# Patient Record
Sex: FEMALE | Race: WHITE | Employment: OTHER | URBAN - METROPOLITAN AREA
[De-identification: names, ages, dates, MRNs, and addresses within clinical notes are randomized per-mention and may not be internally consistent; named-entity substitution may affect disease eponyms.]

---

## 2022-02-24 ENCOUNTER — HOSPITAL ENCOUNTER (EMERGENCY)
Age: 87
Discharge: HOME | End: 2022-02-24
Attending: EMERGENCY MEDICINE
Payer: MEDICARE

## 2022-02-24 ENCOUNTER — APPOINTMENT (OUTPATIENT)
Dept: CT IMAGING | Age: 87
End: 2022-02-24
Payer: MEDICARE

## 2022-02-24 VITALS
OXYGEN SATURATION: 98 % | WEIGHT: 230 LBS | DIASTOLIC BLOOD PRESSURE: 89 MMHG | BODY MASS INDEX: 32.93 KG/M2 | HEART RATE: 70 BPM | RESPIRATION RATE: 18 BRPM | SYSTOLIC BLOOD PRESSURE: 145 MMHG | HEIGHT: 70 IN | TEMPERATURE: 98.3 F

## 2022-02-24 DIAGNOSIS — K46.9 ABDOMINAL HERNIA WITHOUT OBSTRUCTION AND WITHOUT GANGRENE, RECURRENCE NOT SPECIFIED, UNSPECIFIED HERNIA TYPE: ICD-10-CM

## 2022-02-24 DIAGNOSIS — K62.5 RECTAL BLEEDING: Primary | ICD-10-CM

## 2022-02-24 DIAGNOSIS — K64.4 EXTERNAL HEMORRHOID: ICD-10-CM

## 2022-02-24 LAB
ALBUMIN SERPL-MCNC: 4.2 G/DL (ref 3.5–4.6)
ALP BLD-CCNC: 100 U/L (ref 40–130)
ALT SERPL-CCNC: 23 U/L (ref 0–33)
ANION GAP SERPL CALCULATED.3IONS-SCNC: 13 MEQ/L (ref 9–15)
AST SERPL-CCNC: 17 U/L (ref 0–35)
BACTERIA: ABNORMAL /HPF
BASOPHILS ABSOLUTE: 0 K/UL (ref 0–0.1)
BASOPHILS RELATIVE PERCENT: 0.9 % (ref 0.1–1.2)
BILIRUB SERPL-MCNC: 0.4 MG/DL (ref 0.2–0.7)
BILIRUBIN URINE: NEGATIVE
BLOOD, URINE: NORMAL
BUN BLDV-MCNC: 34 MG/DL (ref 8–23)
CALCIUM SERPL-MCNC: 9.1 MG/DL (ref 8.5–9.9)
CHLORIDE BLD-SCNC: 110 MEQ/L (ref 95–107)
CLARITY: CLEAR
CO2: 20 MEQ/L (ref 20–31)
COLOR: YELLOW
CREAT SERPL-MCNC: 1.15 MG/DL (ref 0.5–0.9)
EKG ATRIAL RATE: 63 BPM
EKG P AXIS: 79 DEGREES
EKG P-R INTERVAL: 222 MS
EKG Q-T INTERVAL: 418 MS
EKG QRS DURATION: 96 MS
EKG QTC CALCULATION (BAZETT): 427 MS
EKG R AXIS: 40 DEGREES
EKG T AXIS: 27 DEGREES
EKG VENTRICULAR RATE: 63 BPM
EOSINOPHILS ABSOLUTE: 0.2 K/UL (ref 0–0.4)
EOSINOPHILS RELATIVE PERCENT: 4.8 % (ref 0.7–5.8)
EPITHELIAL CELLS, UA: ABNORMAL /HPF
GFR AFRICAN AMERICAN: 54.2
GFR NON-AFRICAN AMERICAN: 44.8
GLOBULIN: 1.6 G/DL (ref 2.3–3.5)
GLUCOSE BLD-MCNC: 90 MG/DL (ref 70–99)
GLUCOSE URINE: NEGATIVE MG/DL
HCT VFR BLD CALC: 37.7 % (ref 37–47)
HEMOGLOBIN: 12.4 G/DL (ref 11.2–15.7)
IMMATURE GRANULOCYTES #: 0 K/UL
IMMATURE GRANULOCYTES %: 0.3 %
INR BLD: 1
KETONES, URINE: NEGATIVE MG/DL
LEUKOCYTE ESTERASE, URINE: NEGATIVE
LYMPHOCYTES ABSOLUTE: 1.5 K/UL (ref 1.2–3.7)
LYMPHOCYTES RELATIVE PERCENT: 44 %
MAGNESIUM: 1.6 MG/DL (ref 1.7–2.4)
MCH RBC QN AUTO: 35.2 PG (ref 25.6–32.2)
MCHC RBC AUTO-ENTMCNC: 32.9 % (ref 32.2–35.5)
MCV RBC AUTO: 107.1 FL (ref 79.4–94.8)
MONOCYTES ABSOLUTE: 0.5 K/UL (ref 0.2–0.9)
MONOCYTES RELATIVE PERCENT: 16 % (ref 4.7–12.5)
NEUTROPHILS ABSOLUTE: 1.1 K/UL (ref 1.6–6.1)
NEUTROPHILS RELATIVE PERCENT: 34 % (ref 34–71.1)
NITRITE, URINE: NEGATIVE
PDW BLD-RTO: 13.2 % (ref 11.7–14.4)
PH UA: 5 (ref 5–9)
PLATELET # BLD: 99 K/UL (ref 182–369)
POTASSIUM SERPL-SCNC: 4.4 MEQ/L (ref 3.4–4.9)
PROTEIN UA: NEGATIVE MG/DL
PROTHROMBIN TIME: 13.1 SEC (ref 12.3–14.9)
RBC # BLD: 3.52 M/UL (ref 3.93–5.22)
RBC UA: ABNORMAL /HPF (ref 0–2)
SLIDE REVIEW: ABNORMAL
SODIUM BLD-SCNC: 143 MEQ/L (ref 135–144)
SPECIFIC GRAVITY UA: 1.01 (ref 1–1.03)
TOTAL PROTEIN: 5.8 G/DL (ref 6.3–8)
TROPONIN: <0.01 NG/ML (ref 0–0.01)
URINE REFLEX TO CULTURE: NORMAL
UROBILINOGEN, URINE: 0.2 E.U./DL
WBC # BLD: 3.3 K/UL (ref 4–10)
WBC UA: ABNORMAL /HPF (ref 0–5)

## 2022-02-24 PROCEDURE — 74177 CT ABD & PELVIS W/CONTRAST: CPT

## 2022-02-24 PROCEDURE — 80053 COMPREHEN METABOLIC PANEL: CPT

## 2022-02-24 PROCEDURE — 96375 TX/PRO/DX INJ NEW DRUG ADDON: CPT

## 2022-02-24 PROCEDURE — 96365 THER/PROPH/DIAG IV INF INIT: CPT

## 2022-02-24 PROCEDURE — 93005 ELECTROCARDIOGRAM TRACING: CPT

## 2022-02-24 PROCEDURE — 84484 ASSAY OF TROPONIN QUANT: CPT

## 2022-02-24 PROCEDURE — 93010 ELECTROCARDIOGRAM REPORT: CPT | Performed by: INTERNAL MEDICINE

## 2022-02-24 PROCEDURE — 85025 COMPLETE CBC W/AUTO DIFF WBC: CPT

## 2022-02-24 PROCEDURE — 83735 ASSAY OF MAGNESIUM: CPT

## 2022-02-24 PROCEDURE — 36415 COLL VENOUS BLD VENIPUNCTURE: CPT

## 2022-02-24 PROCEDURE — 6360000004 HC RX CONTRAST MEDICATION: Performed by: EMERGENCY MEDICINE

## 2022-02-24 PROCEDURE — 6360000002 HC RX W HCPCS: Performed by: EMERGENCY MEDICINE

## 2022-02-24 PROCEDURE — C9113 INJ PANTOPRAZOLE SODIUM, VIA: HCPCS | Performed by: EMERGENCY MEDICINE

## 2022-02-24 PROCEDURE — 99283 EMERGENCY DEPT VISIT LOW MDM: CPT

## 2022-02-24 PROCEDURE — 85610 PROTHROMBIN TIME: CPT

## 2022-02-24 PROCEDURE — 81001 URINALYSIS AUTO W/SCOPE: CPT

## 2022-02-24 PROCEDURE — 2580000003 HC RX 258: Performed by: EMERGENCY MEDICINE

## 2022-02-24 RX ORDER — CALCIUM CARBONATE-CHOLECALCIFEROL TAB 250 MG-125 UNIT 250-125 MG-UNIT
1 TAB ORAL DAILY
COMMUNITY

## 2022-02-24 RX ORDER — YEAST,DRIED (S. CEREVISIAE)
1 POWDER (GRAM) ORAL DAILY
COMMUNITY

## 2022-02-24 RX ORDER — CLONIDINE 0.3 MG/24H
1 PATCH, EXTENDED RELEASE TRANSDERMAL WEEKLY
COMMUNITY

## 2022-02-24 RX ORDER — LATANOPROST 50 UG/ML
1 SOLUTION/ DROPS OPHTHALMIC NIGHTLY
COMMUNITY

## 2022-02-24 RX ORDER — LENALIDOMIDE 10 MG/1
10 CAPSULE ORAL
COMMUNITY

## 2022-02-24 RX ORDER — TIMOLOL 5 MG/ML
1 SOLUTION/ DROPS OPHTHALMIC 2 TIMES DAILY
COMMUNITY

## 2022-02-24 RX ORDER — ASCORBIC ACID 500 MG
500 TABLET ORAL DAILY
COMMUNITY

## 2022-02-24 RX ORDER — CELECOXIB 200 MG/1
200 CAPSULE ORAL 2 TIMES DAILY
COMMUNITY

## 2022-02-24 RX ORDER — LEVOTHYROXINE SODIUM 0.05 MG/1
50 TABLET ORAL DAILY
COMMUNITY

## 2022-02-24 RX ORDER — SOY ISOFLAVONE 40 MG
1 TABLET ORAL DAILY
COMMUNITY

## 2022-02-24 RX ORDER — VITAMIN E 268 MG
400 CAPSULE ORAL DAILY
COMMUNITY

## 2022-02-24 RX ORDER — ALPRAZOLAM 0.5 MG/1
0.5 TABLET ORAL NIGHTLY PRN
COMMUNITY

## 2022-02-24 RX ORDER — ONDANSETRON 2 MG/ML
4 INJECTION INTRAMUSCULAR; INTRAVENOUS ONCE
Status: COMPLETED | OUTPATIENT
Start: 2022-02-24 | End: 2022-02-24

## 2022-02-24 RX ORDER — 0.9 % SODIUM CHLORIDE 0.9 %
500 INTRAVENOUS SOLUTION INTRAVENOUS ONCE
Status: COMPLETED | OUTPATIENT
Start: 2022-02-24 | End: 2022-02-24

## 2022-02-24 RX ORDER — AMLODIPINE AND OLMESARTAN MEDOXOMIL 10; 40 MG/1; MG/1
1 TABLET ORAL DAILY
COMMUNITY

## 2022-02-24 RX ORDER — LANOLIN ALCOHOL/MO/W.PET/CERES
1000 CREAM (GRAM) TOPICAL DAILY
COMMUNITY

## 2022-02-24 RX ORDER — DOCUSATE SODIUM 100 MG/1
100 CAPSULE, LIQUID FILLED ORAL 2 TIMES DAILY
Qty: 60 CAPSULE | Refills: 0 | Status: SHIPPED | OUTPATIENT
Start: 2022-02-24

## 2022-02-24 RX ORDER — MULTIVITAMIN WITH IRON
1 TABLET ORAL DAILY
COMMUNITY

## 2022-02-24 RX ORDER — HYDROCORTISONE ACETATE 25 MG/1
25 SUPPOSITORY RECTAL 2 TIMES DAILY
Qty: 10 SUPPOSITORY | Refills: 1 | Status: SHIPPED | OUTPATIENT
Start: 2022-02-24

## 2022-02-24 RX ORDER — ANTIARTHRITIC COMBINATION NO.2 900 MG
1 TABLET ORAL DAILY
COMMUNITY

## 2022-02-24 RX ORDER — ACETAMINOPHEN 325 MG/1
650 TABLET ORAL 3 TIMES DAILY PRN
COMMUNITY

## 2022-02-24 RX ORDER — GLUCOSAMINE/D3/BOSWELLIA SERRA 1500MG-400
1 TABLET ORAL DAILY
COMMUNITY

## 2022-02-24 RX ORDER — MAGNESIUM OXIDE 400 MG/1
400 TABLET ORAL DAILY
COMMUNITY

## 2022-02-24 RX ORDER — SODIUM CHLORIDE 0.9 % (FLUSH) 0.9 %
3 SYRINGE (ML) INJECTION EVERY 8 HOURS
Status: DISCONTINUED | OUTPATIENT
Start: 2022-02-24 | End: 2022-02-24 | Stop reason: HOSPADM

## 2022-02-24 RX ADMIN — SODIUM CHLORIDE 500 ML: 9 INJECTION, SOLUTION INTRAVENOUS at 14:11

## 2022-02-24 RX ADMIN — ONDANSETRON 4 MG: 2 INJECTION INTRAMUSCULAR; INTRAVENOUS at 14:12

## 2022-02-24 RX ADMIN — SODIUM CHLORIDE 80 MG: 9 INJECTION, SOLUTION INTRAVENOUS at 14:14

## 2022-02-24 RX ADMIN — IOPAMIDOL 100 ML: 755 INJECTION, SOLUTION INTRAVENOUS at 15:00

## 2022-02-24 RX ADMIN — Medication 3 ML: at 14:14

## 2022-02-24 ASSESSMENT — PAIN SCALES - GENERAL: PAINLEVEL_OUTOF10: 0

## 2022-02-24 NOTE — ED PROVIDER NOTES
CC/HPI: 55-year-old female to the emergency department with chief complaint of episode of bright red rectal bleeding. Patient is not on blood thinners. Does not feel lightheaded or dizzy. Patient states she gets occasional bilateral mid to lower abdominal discomfort that is been going on for months. Patient also had an episode approximately a month ago where she noticed a small amount of bright red blood in her stools one episode. Patient states that she had 2 loose bowel movements after the bowel movement in which she saw blood and she did not see any blood in either of those bowel movements. No known fever no chest pain or shortness of breath. Patient states over the last 2 months she has noticed some burning and discomfort to the rectal area when she has bowel movements      VITALS/PMH/PSH: Reviewed per nurses notes    REVIEW OF SYSTEMS: As in chief complaint history of present illness, otherwise all other systems are reviewed and negative the total 10 systems reviewed    PHYSICAL EXAM:  GEN: Pt alert and oriented, no acute distress. Smiling and pleasant. HEENT:         Normocephalic/Atramatic        PERRL, EOMI. Conjunctiva pink       Throat non-edematous. No erythema noted. No exudates noted. Moist membranes  NECK: Nontender, no signs of trauma, no lymphadenopathy  HEART: Reg S1/S2, without murmer, rub or gallop  LUNGS: Clear to auscultation bilaterally, respirations even and unlabored  ABDOMEN: Bowel sounds positive, soft, nondistended. Mild appearing bilateral mid abdominal discomfort to palpation. No guarding rebound or rigidity  MUSCULOSKELETAL/EXTREMITITES:  No signs of trauma, cyanosis. Patient with chronic appearing bilateral 2-3+ edema. No CVA tenderness  LYMPH: no peripheral lympadenopathy noted  SKIN:  Warm & dry, no rash  NEUROLOGIC:  Alert and oriented. Speech clear  Rectal examination done with female nurse present.   Patient with a tender swollen nonthrombosed external hemorrhoid noted that was very tender to the touch. No active bleeding noted. Patient had a small amount of brown stool which was Hemoccult negative    Medical decision making/ED course;  Patient remained stable throughout the course of emergency department stay. IV was established and lab work was obtained and reviewed. Patient with a white blood cell count of 3.3 with an H&H of 12.4 and 37.7 and platelets of 99. Patient with a CMP within normal limits other than chloride being elevated to 110 BUN and creatinine were elevated at 34 and 1.15. Magnesium was low at 1.6. Troponin was negative. Liver enzymes are within normal limits. Coagulation studies were within normal.  Urinalysis did not show any signs of infection. ER EKG interpretation normal sinus rhythm at 63 bpm with first-degree AV block otherwise no signs of acute ST-T changes. Normal intervals. CT scan of the abdomen pelvis was performed which was interpreted by radiologist as showing the following; Impression       Periumbilical hernia, containing colon and fat,, measuring 3.9 cm, with no CT evidence incarceration or obstruction.       Midline infraumbilical hernia containing fat and small bowel, measuring 1.3 cm, with no CT evidence of incarceration or obstruction.       Mild intrahepatic and extrahepatic ductal dilatation in postcholecystectomy patient.       Small bilateral kidneys.       Inferior vena cava filter.       Cholecystectomy.       Appendectomy.           All CT scans at this facility use dose modulation, iterative reconstruction, and/or weight based dosing when appropriate to reduce radiation dose to as low as reasonably achievable.         Reviewed examination with patient over the areas of the patient's ventral abdominal wall hernias did not show any signs of tenderness or incarceration. Discussed all findings with patient and patient's family. Discussed that we will discharge patient home with outpatient follow-up.   Patient given the number for a general surgeon concerning her abdominal hernia. Also recommended colonoscopy as it has been approximately 3 years since she had it last.  Patient given prescription for Anusol HC and also Colace. Encouraged to return for any worsening and or changes to symptoms or persistent blood or increased rectal bleeding. Patient and family voiced understanding and agreement with treatment plan    Clinical impression;  1) ventral abdominal wall hernias  2) rectal bleeding  3) external hemorrhoid    Disposition/plan; patient discharged home in stable condition. Patient to return for worsening or changes to symptoms. Follow-up with primary care physician and general surgery. Patient also given the number for gastroenterology.      Neal Gallegos DO  02/24/22 0133

## 2023-04-06 ENCOUNTER — HOSPITAL ENCOUNTER (OUTPATIENT)
Dept: LAB | Age: 88
Discharge: HOME | End: 2023-04-06
Payer: MEDICARE

## 2023-04-06 LAB
ALBUMIN SERPL-MCNC: 4 G/DL (ref 3.5–4.6)
ALP SERPL-CCNC: 88 U/L (ref 40–130)
ALT SERPL-CCNC: 21 U/L (ref 0–33)
ANION GAP SERPL CALCULATED.3IONS-SCNC: 11 MEQ/L (ref 9–15)
AST SERPL-CCNC: 19 U/L (ref 0–35)
BILIRUB SERPL-MCNC: 0.4 MG/DL (ref 0.2–0.7)
BUN SERPL-MCNC: 19 MG/DL (ref 8–23)
CALCIUM SERPL-MCNC: 8.9 MG/DL (ref 8.5–9.9)
CHLORIDE SERPL-SCNC: 109 MEQ/L (ref 95–107)
CHOLEST SERPL-MCNC: 141 MG/DL (ref 0–199)
CO2 SERPL-SCNC: 21 MEQ/L (ref 20–31)
CREAT SERPL-MCNC: 1.17 MG/DL (ref 0.5–0.9)
ERYTHROCYTE [DISTWIDTH] IN BLOOD BY AUTOMATED COUNT: 15.9 % (ref 11.5–14.5)
GLOBULIN SER CALC-MCNC: 1.8 G/DL (ref 2.3–3.5)
GLUCOSE SERPL-MCNC: 89 MG/DL (ref 70–99)
HCT VFR BLD AUTO: 38.1 % (ref 37–47)
HDLC SERPL-MCNC: 44 MG/DL (ref 40–59)
HGB BLD-MCNC: 12.7 G/DL (ref 12–16)
LDLC SERPL CALC-MCNC: 68 MG/DL (ref 0–129)
MCH RBC QN AUTO: 35.9 PG (ref 27–31.3)
MCHC RBC AUTO-ENTMCNC: 33.3 % (ref 33–37)
MCV RBC AUTO: 107.9 FL (ref 79.4–94.8)
PLATELET # BLD AUTO: 137 K/UL (ref 130–400)
POTASSIUM SERPL-SCNC: 4.1 MEQ/L (ref 3.4–4.9)
PROT SERPL-MCNC: 5.8 G/DL (ref 6.3–8)
RBC # BLD AUTO: 3.53 M/UL (ref 4.2–5.4)
SODIUM SERPL-SCNC: 141 MEQ/L (ref 135–144)
TRIGL SERPL-MCNC: 144 MG/DL (ref 0–150)
TSH SERPL-MCNC: 1.37 UIU/ML (ref 0.44–3.86)
WBC # BLD AUTO: 3.7 K/UL (ref 4.8–10.8)

## 2023-04-06 PROCEDURE — 36415 COLL VENOUS BLD VENIPUNCTURE: CPT

## 2023-04-06 PROCEDURE — 80061 LIPID PANEL: CPT

## 2023-04-06 PROCEDURE — 82306 VITAMIN D 25 HYDROXY: CPT

## 2023-04-06 PROCEDURE — 80053 COMPREHEN METABOLIC PANEL: CPT

## 2023-04-06 PROCEDURE — 85027 COMPLETE CBC AUTOMATED: CPT

## 2023-04-06 PROCEDURE — 84443 ASSAY THYROID STIM HORMONE: CPT

## 2023-04-07 LAB — VITAMIN D 25-HYDROXY: 48 NG/ML

## 2023-07-17 LAB
AMORPHOUS CRYSTALS, URINE: ABNORMAL /HPF
APPEARANCE, URINE: ABNORMAL
BACTERIA, URINE: ABNORMAL /HPF
BILIRUBIN, URINE: NEGATIVE
BLOOD, URINE: NEGATIVE
COLOR, URINE: YELLOW
GLUCOSE, URINE: NEGATIVE MG/DL
HYALINE CASTS, URINE: ABNORMAL /LPF
KETONES, URINE: ABNORMAL MG/DL
LEUKOCYTE ESTERASE, URINE: ABNORMAL
NITRITE, URINE: NEGATIVE
PH, URINE: 6 (ref 5–8)
PROTEIN, URINE: NEGATIVE MG/DL
RBC, URINE: 2 /HPF (ref 0–5)
SPECIFIC GRAVITY, URINE: 1.01 (ref 1–1.03)
SQUAMOUS EPITHELIAL CELLS, URINE: 5 /HPF
UROBILINOGEN, URINE: <2 MG/DL (ref 0–1.9)
WBC, URINE: 23 /HPF (ref 0–5)

## 2023-07-19 LAB — URINE CULTURE: ABNORMAL

## 2023-08-04 LAB
APPEARANCE, URINE: CLEAR
BILIRUBIN, URINE: NEGATIVE
BLOOD, URINE: NEGATIVE
COLOR, URINE: COLORLESS
GLUCOSE, URINE: NEGATIVE MG/DL
KETONES, URINE: NEGATIVE MG/DL
LEUKOCYTE ESTERASE, URINE: NEGATIVE
NITRITE, URINE: NEGATIVE
PH, URINE: 5 (ref 5–8)
PROTEIN, URINE: NEGATIVE MG/DL
SPECIFIC GRAVITY, URINE: 1 (ref 1–1.03)
UROBILINOGEN, URINE: <2 MG/DL (ref 0–1.9)

## 2023-08-05 LAB — URINE CULTURE: NO GROWTH

## 2023-08-18 LAB — URINE CULTURE: NO GROWTH

## 2023-09-05 LAB
RBC, URINE: <1 /HPF (ref 0–5)
SQUAMOUS EPITHELIAL CELLS, URINE: 2 /HPF
WBC, URINE: <1 /HPF (ref 0–5)

## 2023-09-06 LAB — URINE CULTURE: NO GROWTH

## 2023-09-17 LAB
CLUE CELLS: ABNORMAL
NUGENT SCORE: 4
VAGINITIS-BV + YEAST INTERPRETATION: ABNORMAL
YEAST: ABNORMAL

## 2023-10-06 ENCOUNTER — TELEPHONE (OUTPATIENT)
Dept: CARDIOLOGY | Facility: CLINIC | Age: 88
End: 2023-10-06
Payer: MEDICARE

## 2023-10-06 DIAGNOSIS — I35.0 AORTIC VALVE STENOSIS, ETIOLOGY OF CARDIAC VALVE DISEASE UNSPECIFIED: ICD-10-CM

## 2023-10-06 NOTE — TELEPHONE ENCOUNTER
Trinh Pa RN  You58 minutes ago (1:00 PM)       Patient's daughter Soraya would like a call back to discuss the upcoming Stress test on Monday and what meds Dr. Hsu would like held prior to testing.

## 2023-10-06 NOTE — TELEPHONE ENCOUNTER
I am copying this note forward from [facility]. Patient is pending Laparoscopic Ventral Hernia repair with Dr. Rico MD on 10/19/23.   Patient seen recently with Dr. Shadi MD. Echo and MPL ordered preop.   Clearance pending results. Form in my purple folder.   Echo- 9/28/23  MPL- 10/9/23    I called daughter Soraya back and advised, no medications need held prior to procedure. Verbal understanding.

## 2023-10-09 ENCOUNTER — LAB (OUTPATIENT)
Dept: LAB | Facility: LAB | Age: 88
End: 2023-10-09
Payer: MEDICARE

## 2023-10-09 ENCOUNTER — ANCILLARY PROCEDURE (OUTPATIENT)
Dept: RADIOLOGY | Facility: CLINIC | Age: 88
End: 2023-10-09
Payer: MEDICARE

## 2023-10-09 ENCOUNTER — HOSPITAL ENCOUNTER (OUTPATIENT)
Dept: CARDIOLOGY | Facility: CLINIC | Age: 88
Discharge: HOME | End: 2023-10-09
Payer: MEDICARE

## 2023-10-09 DIAGNOSIS — Z01.810 ENCOUNTER FOR PREPROCEDURAL CARDIOVASCULAR EXAMINATION: ICD-10-CM

## 2023-10-09 DIAGNOSIS — I10 ESSENTIAL (PRIMARY) HYPERTENSION: ICD-10-CM

## 2023-10-09 DIAGNOSIS — I35.0 NONRHEUMATIC AORTIC (VALVE) STENOSIS: Primary | ICD-10-CM

## 2023-10-09 DIAGNOSIS — I10 ESSENTIAL (PRIMARY) HYPERTENSION: Primary | ICD-10-CM

## 2023-10-09 DIAGNOSIS — I35.0 NONRHEUMATIC AORTIC (VALVE) STENOSIS: ICD-10-CM

## 2023-10-09 LAB
ALBUMIN SERPL BCP-MCNC: 3.9 G/DL (ref 3.4–5)
ALP SERPL-CCNC: 81 U/L (ref 33–136)
ALT SERPL W P-5'-P-CCNC: 12 U/L (ref 7–45)
ANION GAP SERPL CALC-SCNC: 13 MMOL/L (ref 10–20)
AST SERPL W P-5'-P-CCNC: 12 U/L (ref 9–39)
BILIRUB SERPL-MCNC: 0.6 MG/DL (ref 0–1.2)
BUN SERPL-MCNC: 18 MG/DL (ref 6–23)
CALCIUM SERPL-MCNC: 9.4 MG/DL (ref 8.6–10.3)
CHLORIDE SERPL-SCNC: 108 MMOL/L (ref 98–107)
CO2 SERPL-SCNC: 25 MMOL/L (ref 21–32)
CREAT SERPL-MCNC: 1.05 MG/DL (ref 0.5–1.05)
GFR SERPL CREATININE-BSD FRML MDRD: 52 ML/MIN/1.73M*2
GLUCOSE SERPL-MCNC: 79 MG/DL (ref 74–99)
POTASSIUM SERPL-SCNC: 4.4 MMOL/L (ref 3.5–5.3)
PROT SERPL-MCNC: 6.1 G/DL (ref 6.4–8.2)
SODIUM SERPL-SCNC: 142 MMOL/L (ref 136–145)

## 2023-10-09 PROCEDURE — 78452 HT MUSCLE IMAGE SPECT MULT: CPT

## 2023-10-09 PROCEDURE — 93018 CV STRESS TEST I&R ONLY: CPT | Performed by: INTERNAL MEDICINE

## 2023-10-09 PROCEDURE — 2500000004 HC RX 250 GENERAL PHARMACY W/ HCPCS (ALT 636 FOR OP/ED): Performed by: INTERNAL MEDICINE

## 2023-10-09 PROCEDURE — A9502 TC99M TETROFOSMIN: HCPCS | Performed by: INTERNAL MEDICINE

## 2023-10-09 PROCEDURE — 80053 COMPREHEN METABOLIC PANEL: CPT

## 2023-10-09 PROCEDURE — 78452 HT MUSCLE IMAGE SPECT MULT: CPT | Performed by: INTERNAL MEDICINE

## 2023-10-09 PROCEDURE — 36415 COLL VENOUS BLD VENIPUNCTURE: CPT

## 2023-10-09 PROCEDURE — 3430000001 HC RX 343 DIAGNOSTIC RADIOPHARMACEUTICALS: Performed by: INTERNAL MEDICINE

## 2023-10-09 PROCEDURE — 93017 CV STRESS TEST TRACING ONLY: CPT

## 2023-10-09 PROCEDURE — 93016 CV STRESS TEST SUPVJ ONLY: CPT | Performed by: INTERNAL MEDICINE

## 2023-10-09 RX ORDER — REGADENOSON 0.08 MG/ML
0.4 INJECTION, SOLUTION INTRAVENOUS ONCE
Status: COMPLETED | OUTPATIENT
Start: 2023-10-09 | End: 2023-10-09

## 2023-10-09 RX ADMIN — REGADENOSON 0.4 MG: 0.08 INJECTION, SOLUTION INTRAVENOUS at 11:19

## 2023-10-09 RX ADMIN — TETROFOSMIN 25.8 MILLICURIE: 0.23 INJECTION, POWDER, LYOPHILIZED, FOR SOLUTION INTRAVENOUS at 11:19

## 2023-10-10 ENCOUNTER — ANCILLARY PROCEDURE (OUTPATIENT)
Dept: RADIOLOGY | Facility: CLINIC | Age: 88
End: 2023-10-10
Payer: MEDICARE

## 2023-10-10 ENCOUNTER — TELEPHONE (OUTPATIENT)
Dept: PRIMARY CARE | Facility: CLINIC | Age: 88
End: 2023-10-10
Payer: MEDICARE

## 2023-10-10 PROCEDURE — A9502 TC99M TETROFOSMIN: HCPCS | Performed by: INTERNAL MEDICINE

## 2023-10-10 PROCEDURE — 3430000001 HC RX 343 DIAGNOSTIC RADIOPHARMACEUTICALS: Performed by: INTERNAL MEDICINE

## 2023-10-10 RX ADMIN — TETROFOSMIN 35.9 MILLICURIE: 0.23 INJECTION, POWDER, LYOPHILIZED, FOR SOLUTION INTRAVENOUS at 09:58

## 2023-10-10 NOTE — TELEPHONE ENCOUNTER
Dr Dunbar office  calling to check status of medical clearance.  Patient is scheduled for a procedure on Friday.  Patient has not seen PCP since July.  There is no pending appointment on the book but they are requesting clearance.  They are also waiting for Cardiac clearance from [facility].  Please advise

## 2023-10-11 NOTE — TELEPHONE ENCOUNTER
Called General Surgery advising of PCP's note.  Patient will need a Pre-op clearance appointment to be cleared for surgery.  Please advise

## 2023-10-12 PROBLEM — N94.89 VAGINAL BURNING: Status: ACTIVE | Noted: 2023-10-12

## 2023-10-12 PROBLEM — R30.0 BURNING WITH URINATION: Status: ACTIVE | Noted: 2023-10-12

## 2023-10-12 PROBLEM — N39.0 UTI (URINARY TRACT INFECTION): Status: ACTIVE | Noted: 2023-10-12

## 2023-10-12 PROBLEM — M16.12 ARTHRITIS OF LEFT HIP: Status: ACTIVE | Noted: 2023-10-12

## 2023-10-12 PROBLEM — C83.33 DIFFUSE LARGE B-CELL LYMPHOMA OF INTRA-ABDOMINAL LYMPH NODES (MULTI): Status: ACTIVE | Noted: 2023-10-12

## 2023-10-12 PROBLEM — I89.0 LYMPHEDEMA OF LEFT LOWER EXTREMITY: Status: ACTIVE | Noted: 2023-10-12

## 2023-10-12 PROBLEM — K42.9 PERIUMBILICAL HERNIA: Status: ACTIVE | Noted: 2023-10-12

## 2023-10-12 PROBLEM — R10.9 FLANK PAIN: Status: ACTIVE | Noted: 2023-10-12

## 2023-10-12 PROBLEM — N89.8 VAGINAL LESION: Status: ACTIVE | Noted: 2023-10-12

## 2023-10-12 PROBLEM — M81.0 OSTEOPOROSIS: Status: ACTIVE | Noted: 2023-10-12

## 2023-10-12 PROBLEM — G47.00 INSOMNIA: Status: ACTIVE | Noted: 2023-10-12

## 2023-10-12 PROBLEM — I10 ESSENTIAL HYPERTENSION: Status: ACTIVE | Noted: 2023-10-12

## 2023-10-12 PROBLEM — N18.31 STAGE 3A CHRONIC KIDNEY DISEASE (CKD) (MULTI): Status: ACTIVE | Noted: 2023-10-12

## 2023-10-12 PROBLEM — R32 URINARY INCONTINENCE: Status: ACTIVE | Noted: 2023-10-12

## 2023-10-12 PROBLEM — I35.0 AORTIC STENOSIS: Status: ACTIVE | Noted: 2023-10-12

## 2023-10-12 PROBLEM — E66.9 CLASS 1 OBESITY WITH BODY MASS INDEX (BMI) OF 34.0 TO 34.9 IN ADULT: Status: ACTIVE | Noted: 2023-10-12

## 2023-10-12 PROBLEM — E66.811 CLASS 1 OBESITY WITH BODY MASS INDEX (BMI) OF 34.0 TO 34.9 IN ADULT: Status: ACTIVE | Noted: 2023-10-12

## 2023-10-12 PROBLEM — E03.9 HYPOTHYROIDISM: Status: ACTIVE | Noted: 2023-10-12

## 2023-10-12 PROBLEM — K64.9 HEMORRHOIDS: Status: ACTIVE | Noted: 2023-10-12

## 2023-10-12 PROBLEM — N94.9 VAGINAL BURNING: Status: ACTIVE | Noted: 2023-10-12

## 2023-10-12 PROBLEM — I87.8 VENOUS STASIS: Status: ACTIVE | Noted: 2023-10-12

## 2023-10-12 PROBLEM — F32.0 DEPRESSION, MAJOR, SINGLE EPISODE, MILD (CMS-HCC): Status: ACTIVE | Noted: 2023-10-12

## 2023-10-12 RX ORDER — LANOLIN ALCOHOL/MO/W.PET/CERES
1 CREAM (GRAM) TOPICAL DAILY
COMMUNITY
End: 2024-01-29 | Stop reason: WASHOUT

## 2023-10-12 RX ORDER — VITAMIN E MIXED 400 UNIT
400 CAPSULE ORAL DAILY
COMMUNITY

## 2023-10-12 RX ORDER — CETIRIZINE HYDROCHLORIDE 10 MG/1
10 TABLET, CHEWABLE ORAL DAILY
COMMUNITY
End: 2024-01-29 | Stop reason: WASHOUT

## 2023-10-12 RX ORDER — LIDOCAINE HCL 4 %
1 CREAM (GRAM) TOPICAL DAILY
COMMUNITY
End: 2024-01-29 | Stop reason: WASHOUT

## 2023-10-12 RX ORDER — DARIFENACIN 15 MG/1
15 TABLET, EXTENDED RELEASE ORAL DAILY
COMMUNITY
Start: 2011-11-21 | End: 2023-10-16 | Stop reason: SDUPTHER

## 2023-10-12 RX ORDER — CELECOXIB 200 MG/1
200 CAPSULE ORAL 2 TIMES DAILY
COMMUNITY
End: 2023-10-16 | Stop reason: SDUPTHER

## 2023-10-12 RX ORDER — ACETAMINOPHEN 325 MG/1
650 TABLET ORAL EVERY 6 HOURS PRN
COMMUNITY

## 2023-10-12 RX ORDER — ELECTROLYTES/DEXTROSE
5 SOLUTION, ORAL ORAL DAILY
COMMUNITY
End: 2023-10-16 | Stop reason: WASHOUT

## 2023-10-12 RX ORDER — TIMOLOL MALEATE 5 MG/ML
1 SOLUTION/ DROPS OPHTHALMIC EVERY 12 HOURS
COMMUNITY

## 2023-10-12 RX ORDER — LEVOTHYROXINE SODIUM 50 UG/1
50 TABLET ORAL
COMMUNITY
Start: 2011-11-21 | End: 2023-10-16 | Stop reason: SDUPTHER

## 2023-10-12 RX ORDER — ASPIRIN 81 MG/1
81 TABLET ORAL DAILY
COMMUNITY

## 2023-10-12 RX ORDER — TERCONAZOLE 4 MG/G
1 CREAM VAGINAL NIGHTLY
COMMUNITY
Start: 2023-09-15 | End: 2024-03-02 | Stop reason: HOSPADM

## 2023-10-12 RX ORDER — AMLODIPINE AND OLMESARTAN MEDOXOMIL 10; 40 MG/1; MG/1
1 TABLET ORAL
COMMUNITY
End: 2023-10-16 | Stop reason: SDUPTHER

## 2023-10-12 RX ORDER — TROSPIUM CHLORIDE 20 MG/1
20 TABLET, FILM COATED ORAL EVERY 12 HOURS
COMMUNITY

## 2023-10-12 RX ORDER — ESTRADIOL 0.1 MG/G
0.01 CREAM VAGINAL NIGHTLY
COMMUNITY
Start: 2023-09-16 | End: 2024-03-21 | Stop reason: SDUPTHER

## 2023-10-12 RX ORDER — DOCUSATE SODIUM 100 MG/1
100 CAPSULE, LIQUID FILLED ORAL 2 TIMES DAILY
COMMUNITY

## 2023-10-12 RX ORDER — LATANOPROST 50 UG/ML
1 SOLUTION/ DROPS OPHTHALMIC NIGHTLY
COMMUNITY

## 2023-10-12 RX ORDER — MULTIVIT-MIN/IRON/FOLIC ACID/K 18-600-40
1 CAPSULE ORAL DAILY
COMMUNITY
End: 2024-01-29 | Stop reason: WASHOUT

## 2023-10-12 RX ORDER — LENALIDOMIDE 5 MG/1
5 CAPSULE ORAL DAILY
COMMUNITY
Start: 2023-09-07

## 2023-10-12 RX ORDER — CLONIDINE 0.3 MG/24H
1 PATCH, EXTENDED RELEASE TRANSDERMAL WEEKLY
COMMUNITY
End: 2023-10-16 | Stop reason: SDUPTHER

## 2023-10-12 RX ORDER — PLANT STANOL ESTER 450 MG
8000 TABLET ORAL DAILY
COMMUNITY
End: 2024-01-29 | Stop reason: WASHOUT

## 2023-10-12 RX ORDER — OMEGA-3-ACID ETHYL ESTERS 1 G/1
1 CAPSULE, LIQUID FILLED ORAL 2 TIMES DAILY
COMMUNITY

## 2023-10-12 RX ORDER — VALACYCLOVIR HYDROCHLORIDE 500 MG/1
500 TABLET, FILM COATED ORAL DAILY
COMMUNITY
Start: 2023-07-26

## 2023-10-12 RX ORDER — CHOLECALCIFEROL (VITAMIN D3) 25 MCG
1 TABLET ORAL DAILY
COMMUNITY
End: 2024-01-29 | Stop reason: WASHOUT

## 2023-10-12 NOTE — TELEPHONE ENCOUNTER
MD Yonis Nina RN  Stress test is normal    Echo done Preop 9/28/23:  CONCLUSIONS:  1. Left ventricular systolic function is normal with a 60% estimated ejection fraction.  2. Spectral Doppler shows an impaired relaxation pattern of left ventricular diastolic filling.  3. There is moderate concentric left ventricular hypertrophy.  4. There is no evidence of mitral valve stenosis.  5. Trace mitral valve regurgitation.  6. Moderate tricuspid regurgitation.  7. Moderate aortic valve stenosis. (Peak 24.4mmhg)   8. The aortic valve appears tricuspid with restriction.  9. There is moderate aortic valve cusp calcification.  10. Moderately elevated pulmonary artery pressure.    -------------------------------------    I copied Echo results and highlighted changes noted from 2019 study. Not sure why dictation noted moderate AVS when peak is only 24.   Is it okay to clear patient still?  Hold ASA x7 days?

## 2023-10-13 NOTE — TELEPHONE ENCOUNTER
Danny Hsu MD  You23 hours ago (3:31 PM)       Moderate aortic stenosis and tricuspid regurgitation repeat echo in 1 year   Also- per Dr. Shadi MD- patient cleared for procedure. Form signed and faxed with confirmation received. Placed to scan.     Patient advised with verbal understanding.

## 2023-10-16 ENCOUNTER — OFFICE VISIT (OUTPATIENT)
Dept: PRIMARY CARE | Facility: CLINIC | Age: 88
End: 2023-10-16
Payer: MEDICARE

## 2023-10-16 VITALS
HEIGHT: 69 IN | BODY MASS INDEX: 34.41 KG/M2 | HEART RATE: 68 BPM | TEMPERATURE: 97.5 F | SYSTOLIC BLOOD PRESSURE: 130 MMHG | DIASTOLIC BLOOD PRESSURE: 78 MMHG

## 2023-10-16 DIAGNOSIS — K42.9 PERIUMBILICAL HERNIA: ICD-10-CM

## 2023-10-16 DIAGNOSIS — E03.9 HYPOTHYROIDISM, UNSPECIFIED TYPE: ICD-10-CM

## 2023-10-16 DIAGNOSIS — F32.0 DEPRESSION, MAJOR, SINGLE EPISODE, MILD (CMS-HCC): ICD-10-CM

## 2023-10-16 DIAGNOSIS — M19.90 ARTHRITIS: ICD-10-CM

## 2023-10-16 DIAGNOSIS — N94.9 VAGINAL BURNING: ICD-10-CM

## 2023-10-16 DIAGNOSIS — Z23 ENCOUNTER FOR IMMUNIZATION: ICD-10-CM

## 2023-10-16 DIAGNOSIS — N18.31 STAGE 3A CHRONIC KIDNEY DISEASE (CKD) (MULTI): ICD-10-CM

## 2023-10-16 DIAGNOSIS — Z01.818 PRE-OP EXAMINATION: Primary | ICD-10-CM

## 2023-10-16 DIAGNOSIS — I10 ESSENTIAL HYPERTENSION: ICD-10-CM

## 2023-10-16 DIAGNOSIS — E66.09 CLASS 1 OBESITY DUE TO EXCESS CALORIES WITHOUT SERIOUS COMORBIDITY WITH BODY MASS INDEX (BMI) OF 34.0 TO 34.9 IN ADULT: ICD-10-CM

## 2023-10-16 PROBLEM — R32 URINARY INCONTINENCE: Status: RESOLVED | Noted: 2023-10-12 | Resolved: 2023-10-16

## 2023-10-16 PROBLEM — N89.8 VAGINAL LESION: Status: RESOLVED | Noted: 2023-10-12 | Resolved: 2023-10-16

## 2023-10-16 PROBLEM — N94.89 VAGINAL BURNING: Status: RESOLVED | Noted: 2023-10-12 | Resolved: 2023-10-16

## 2023-10-16 PROBLEM — N39.0 UTI (URINARY TRACT INFECTION): Status: RESOLVED | Noted: 2023-10-12 | Resolved: 2023-10-16

## 2023-10-16 PROBLEM — R30.0 BURNING WITH URINATION: Status: RESOLVED | Noted: 2023-10-12 | Resolved: 2023-10-16

## 2023-10-16 PROBLEM — R10.9 FLANK PAIN: Status: RESOLVED | Noted: 2023-10-12 | Resolved: 2023-10-16

## 2023-10-16 PROCEDURE — 99214 OFFICE O/P EST MOD 30 MIN: CPT | Performed by: INTERNAL MEDICINE

## 2023-10-16 PROCEDURE — 90662 IIV NO PRSV INCREASED AG IM: CPT | Performed by: INTERNAL MEDICINE

## 2023-10-16 PROCEDURE — 1160F RVW MEDS BY RX/DR IN RCRD: CPT | Performed by: INTERNAL MEDICINE

## 2023-10-16 PROCEDURE — 1036F TOBACCO NON-USER: CPT | Performed by: INTERNAL MEDICINE

## 2023-10-16 PROCEDURE — 3078F DIAST BP <80 MM HG: CPT | Performed by: INTERNAL MEDICINE

## 2023-10-16 PROCEDURE — 3075F SYST BP GE 130 - 139MM HG: CPT | Performed by: INTERNAL MEDICINE

## 2023-10-16 PROCEDURE — 1159F MED LIST DOCD IN RCRD: CPT | Performed by: INTERNAL MEDICINE

## 2023-10-16 PROCEDURE — G0008 ADMIN INFLUENZA VIRUS VAC: HCPCS | Performed by: INTERNAL MEDICINE

## 2023-10-16 RX ORDER — CLONIDINE 0.3 MG/24H
1 PATCH, EXTENDED RELEASE TRANSDERMAL
Qty: 90 PATCH | Refills: 0 | Status: SHIPPED | OUTPATIENT
Start: 2023-10-16 | End: 2023-11-27

## 2023-10-16 RX ORDER — DARIFENACIN 15 MG/1
15 TABLET, EXTENDED RELEASE ORAL DAILY
Qty: 90 TABLET | Refills: 0 | Status: SHIPPED | OUTPATIENT
Start: 2023-10-16 | End: 2023-12-22

## 2023-10-16 RX ORDER — CELECOXIB 200 MG/1
400 CAPSULE ORAL DAILY
Qty: 90 CAPSULE | Refills: 0 | Status: SHIPPED | OUTPATIENT
Start: 2023-10-16 | End: 2023-12-26

## 2023-10-16 RX ORDER — METRONIDAZOLE 7.5 MG/G
GEL VAGINAL NIGHTLY
Qty: 70 G | Refills: 0 | Status: SHIPPED | OUTPATIENT
Start: 2023-10-16 | End: 2023-10-16 | Stop reason: WASHOUT

## 2023-10-16 RX ORDER — AMLODIPINE AND OLMESARTAN MEDOXOMIL 10; 40 MG/1; MG/1
1 TABLET ORAL
Qty: 90 TABLET | Refills: 0 | Status: SHIPPED | OUTPATIENT
Start: 2023-10-16 | End: 2023-11-27

## 2023-10-16 RX ORDER — FOLIC ACID 0.4 MG
0.4 TABLET ORAL DAILY
COMMUNITY
End: 2024-01-29 | Stop reason: WASHOUT

## 2023-10-16 RX ORDER — LEVOTHYROXINE SODIUM 50 UG/1
50 TABLET ORAL
Qty: 90 TABLET | Refills: 0 | Status: SHIPPED | OUTPATIENT
Start: 2023-10-16 | End: 2023-11-27

## 2023-10-16 RX ORDER — ASCORBIC ACID 125 MG
5000 TABLET,CHEWABLE ORAL DAILY
COMMUNITY
End: 2024-01-29 | Stop reason: WASHOUT

## 2023-10-16 ASSESSMENT — ENCOUNTER SYMPTOMS
OCCASIONAL FEELINGS OF UNSTEADINESS: 1
DEPRESSION: 1
LOSS OF SENSATION IN FEET: 1

## 2023-10-16 ASSESSMENT — PATIENT HEALTH QUESTIONNAIRE - PHQ9
2. FEELING DOWN, DEPRESSED OR HOPELESS: NOT AT ALL
SUM OF ALL RESPONSES TO PHQ9 QUESTIONS 1 AND 2: 0
1. LITTLE INTEREST OR PLEASURE IN DOING THINGS: NOT AT ALL

## 2023-10-16 NOTE — PATIENT INSTRUCTIONS
How can I help Laura do this?  ---------------------------------------------  -BE PATIENT WITH Laura, remember it may take 10 times before they start to like new food. So, start with small bites and just keep trying.  -Serve at least one vegetable or fruit at every meal. Even try two. Remember, portions do not have to be as big as you think.  -Encourage eating fruits and vegetables instead of drinking them..it's a better way to get fiber and vitamins..so limit the amount of juice to 1/2 cup per day for children 1-6 years and one cup per day for children 7-18 years of age. Try using 1/2 part water and 1/2 part juice.    Spend less than two hours per day watching television and other screen media. Screen media includes video games, movies and computer use for entertainment.    How can I help Laura do this?  -Turn off the TV at dinner. Dinner is the best time to hang out with your kids and just talk, learn about their day, and tell them about your day. Your kids have a lot to learn from you and dinner is a great time to share.

## 2023-10-16 NOTE — PROGRESS NOTES
"Subjective   Patient ID: Laura Woodward is a 88 y.o. female who presents for PREAD clearance (Hernia repair) and Flu Vaccine.    HPI   88-year-old female with a past medical history of hypertension hypothyroidism chronic kidney disease depression here for preop examination for her hernia repair no chest pain also no cough no cold  Review of Systems  REVIEW OF SYSTEMS:  General:  Denies significant weight changes, fever, chills or weakness.  SKIN: Denies any rash or change in moles.  HEENT:  No vision or hearing changes. No headache. No vertigo, No tinnitus.   GI:  No loss of appetite. No change in bowel habit. No abdominal pain. No blood in stool.  GUR: No dysuria. No hematoma, No fever. No incontinence.  Respiratory:  No cough or shortness of breath.  CNS:  No memory or mood changes. No gait disturbance. No focal weakness. No tremors. No tingling or number of extremities.   ENDO: No cold intolerance. No fatigue.    Objective   /78 (BP Location: Right arm, Patient Position: Sitting, BP Cuff Size: Large adult)   Pulse 68   Temp 36.4 °C (97.5 °F)   Ht 1.753 m (5' 9\")   BMI 34.41 kg/m²     Physical Exam  PHYSICAL EXAM LONG:  Vitals:  Per [employer].  General Appearance:  Normal-built, well-nourished  with no apparent distress.  Skin:  Normal turgor.  No rash.  Head:  Normocephalic, atraumatic.  Eyes:  Pupils are equal, round, and reactive to light and accommodation.  Extraocular movements are intact.  No pallor of conjunctivae.  Mouth has moist oral mucosa.  Pharynx appears normal.  No erythema.  Nose:  Nasal mucosa normal.  Turbinates are within normal limits.  Ears:  Bilateral auditory ear canals are normal.  Bilateral tympanic membranes are normal and visible.  Neck:  Supple.  No JVD.  No carotid bruit.  No thyromegaly. No cervical lymphadenopathy.   Chest:  Bilaterally good air entry and bilaterally clear to auscultation.  No wheezing.  No crackles.  Heart:  Regular rate and rhythm.  S1, S2 positive.  " No murmur.  Abdomen:  Soft and nontender.  Bowel sounds are positive.  No organomegaly.  Extremities:  Bilaterally no pedal pitting edema.  Bilaterally 2+ dorsalis pedis pulses.  Neuro Exam:  Cranial nerves from II to XII intact.  No facial droop.  Tongue at midline.  Facial sensation intact to light touch and pain sensation.  Motor strength 5/5 in upper and lower extremities.  Sensation is grossly intact to light touch and pain sensation.  Deep tendon reflexes are bilaterally symmetric in upper and lower extremities and within normal limits, 2+.  No cerebellar signs.  Finger-to-nose intact.      Blood work reviewed fasting blood sugar 79 GFR 52 creatinine 1.05  Assessment/Plan     Preop examination patient has medically acceptable risk for surgery recommended to have cardiac clearance    Hypertension stable advised DASH diet continue medications    Stage III chronic kidney disease improved    Depression stable advised to continue the same I will see her back in December or January with a complete blood work we gave flu shot today

## 2023-10-19 ENCOUNTER — ANESTHESIA (OUTPATIENT)
Dept: OPERATING ROOM | Facility: HOSPITAL | Age: 88
End: 2023-10-19
Payer: MEDICARE

## 2023-10-19 ENCOUNTER — HOSPITAL ENCOUNTER (OUTPATIENT)
Facility: HOSPITAL | Age: 88
Discharge: HOME | End: 2023-10-20
Attending: SURGERY | Admitting: SURGERY
Payer: MEDICARE

## 2023-10-19 ENCOUNTER — ANESTHESIA EVENT (OUTPATIENT)
Dept: OPERATING ROOM | Facility: HOSPITAL | Age: 88
End: 2023-10-19
Payer: MEDICARE

## 2023-10-19 VITALS
RESPIRATION RATE: 16 BRPM | SYSTOLIC BLOOD PRESSURE: 144 MMHG | HEART RATE: 74 BPM | OXYGEN SATURATION: 99 % | TEMPERATURE: 97.5 F | DIASTOLIC BLOOD PRESSURE: 77 MMHG

## 2023-10-19 DIAGNOSIS — K42.9 PERIUMBILICAL HERNIA: Primary | ICD-10-CM

## 2023-10-19 PROCEDURE — 2780000003 HC OR 278 NO HCPCS: Performed by: SURGERY

## 2023-10-19 PROCEDURE — 3700000002 HC GENERAL ANESTHESIA TIME - EACH INCREMENTAL 1 MINUTE: Performed by: SURGERY

## 2023-10-19 PROCEDURE — 2720000007 HC OR 272 NO HCPCS: Performed by: SURGERY

## 2023-10-19 PROCEDURE — 2500000001 HC RX 250 WO HCPCS SELF ADMINISTERED DRUGS (ALT 637 FOR MEDICARE OP): Performed by: STUDENT IN AN ORGANIZED HEALTH CARE EDUCATION/TRAINING PROGRAM

## 2023-10-19 PROCEDURE — 96372 THER/PROPH/DIAG INJ SC/IM: CPT | Performed by: SURGERY

## 2023-10-19 PROCEDURE — 7100000001 HC RECOVERY ROOM TIME - INITIAL BASE CHARGE: Performed by: SURGERY

## 2023-10-19 PROCEDURE — 7100000002 HC RECOVERY ROOM TIME - EACH INCREMENTAL 1 MINUTE: Performed by: SURGERY

## 2023-10-19 PROCEDURE — C1781 MESH (IMPLANTABLE): HCPCS | Performed by: SURGERY

## 2023-10-19 PROCEDURE — 2500000004 HC RX 250 GENERAL PHARMACY W/ HCPCS (ALT 636 FOR OP/ED): Performed by: STUDENT IN AN ORGANIZED HEALTH CARE EDUCATION/TRAINING PROGRAM

## 2023-10-19 PROCEDURE — 2500000005 HC RX 250 GENERAL PHARMACY W/O HCPCS: Performed by: SURGERY

## 2023-10-19 PROCEDURE — 2500000004 HC RX 250 GENERAL PHARMACY W/ HCPCS (ALT 636 FOR OP/ED): Performed by: NURSE ANESTHETIST, CERTIFIED REGISTERED

## 2023-10-19 PROCEDURE — 2500000001 HC RX 250 WO HCPCS SELF ADMINISTERED DRUGS (ALT 637 FOR MEDICARE OP): Performed by: SURGERY

## 2023-10-19 PROCEDURE — 49593 RPR AA HRN 1ST 3-10 RDC: CPT | Performed by: SURGERY

## 2023-10-19 PROCEDURE — 2500000004 HC RX 250 GENERAL PHARMACY W/ HCPCS (ALT 636 FOR OP/ED): Performed by: SURGERY

## 2023-10-19 PROCEDURE — 3600000004 HC OR TIME - INITIAL BASE CHARGE - PROCEDURE LEVEL FOUR: Performed by: SURGERY

## 2023-10-19 PROCEDURE — 3700000001 HC GENERAL ANESTHESIA TIME - INITIAL BASE CHARGE: Performed by: SURGERY

## 2023-10-19 PROCEDURE — 2500000005 HC RX 250 GENERAL PHARMACY W/O HCPCS: Performed by: NURSE ANESTHETIST, CERTIFIED REGISTERED

## 2023-10-19 PROCEDURE — 7100000011 HC EXTENDED STAY RECOVERY HOURLY - NURSING UNIT

## 2023-10-19 PROCEDURE — 3600000009 HC OR TIME - EACH INCREMENTAL 1 MINUTE - PROCEDURE LEVEL FOUR: Performed by: SURGERY

## 2023-10-19 DEVICE — VENTRALIGHT ST MESH WITH ECHO PS POSITONING SYSTEM
Type: IMPLANTABLE DEVICE | Site: ABDOMEN | Status: FUNCTIONAL
Brand: VENTRALIGHT ST MESH WITH ECHO PS POSITONING SYSTEM

## 2023-10-19 RX ORDER — OXYCODONE HYDROCHLORIDE 5 MG/1
5 TABLET ORAL EVERY 4 HOURS PRN
Status: DISCONTINUED | OUTPATIENT
Start: 2023-10-19 | End: 2023-10-20 | Stop reason: HOSPADM

## 2023-10-19 RX ORDER — LIDOCAINE HYDROCHLORIDE 10 MG/ML
0.1 INJECTION, SOLUTION EPIDURAL; INFILTRATION; INTRACAUDAL; PERINEURAL ONCE
Status: DISCONTINUED | OUTPATIENT
Start: 2023-10-19 | End: 2023-10-19 | Stop reason: HOSPADM

## 2023-10-19 RX ORDER — ACETAMINOPHEN 650 MG/1
650 SUPPOSITORY RECTAL EVERY 4 HOURS PRN
Status: DISCONTINUED | OUTPATIENT
Start: 2023-10-19 | End: 2023-10-20 | Stop reason: HOSPADM

## 2023-10-19 RX ORDER — TIMOLOL MALEATE 5 MG/ML
1 SOLUTION/ DROPS OPHTHALMIC EVERY 12 HOURS
Status: DISCONTINUED | OUTPATIENT
Start: 2023-10-19 | End: 2023-10-20 | Stop reason: HOSPADM

## 2023-10-19 RX ORDER — LIDOCAINE HYDROCHLORIDE 20 MG/ML
INJECTION, SOLUTION INFILTRATION; PERINEURAL AS NEEDED
Status: DISCONTINUED | OUTPATIENT
Start: 2023-10-19 | End: 2023-10-19

## 2023-10-19 RX ORDER — ONDANSETRON HYDROCHLORIDE 2 MG/ML
4 INJECTION, SOLUTION INTRAVENOUS EVERY 6 HOURS PRN
Status: DISCONTINUED | OUTPATIENT
Start: 2023-10-19 | End: 2023-10-20 | Stop reason: HOSPADM

## 2023-10-19 RX ORDER — OXYCODONE AND ACETAMINOPHEN 5; 325 MG/1; MG/1
1 TABLET ORAL EVERY 6 HOURS PRN
Qty: 12 TABLET | Refills: 0 | Status: SHIPPED | OUTPATIENT
Start: 2023-10-19 | End: 2023-10-24

## 2023-10-19 RX ORDER — ONDANSETRON HYDROCHLORIDE 2 MG/ML
4 INJECTION, SOLUTION INTRAVENOUS ONCE AS NEEDED
Status: DISCONTINUED | OUTPATIENT
Start: 2023-10-19 | End: 2023-10-19 | Stop reason: HOSPADM

## 2023-10-19 RX ORDER — BUPIVACAINE HCL/EPINEPHRINE 0.25-.0005
VIAL (ML) INJECTION AS NEEDED
Status: DISCONTINUED | OUTPATIENT
Start: 2023-10-19 | End: 2023-10-19 | Stop reason: HOSPADM

## 2023-10-19 RX ORDER — ACETAMINOPHEN 160 MG/5ML
650 SOLUTION ORAL EVERY 4 HOURS PRN
Status: DISCONTINUED | OUTPATIENT
Start: 2023-10-19 | End: 2023-10-20 | Stop reason: HOSPADM

## 2023-10-19 RX ORDER — ONDANSETRON HYDROCHLORIDE 2 MG/ML
INJECTION, SOLUTION INTRAVENOUS AS NEEDED
Status: DISCONTINUED | OUTPATIENT
Start: 2023-10-19 | End: 2023-10-19

## 2023-10-19 RX ORDER — FENTANYL CITRATE 50 UG/ML
25 INJECTION, SOLUTION INTRAMUSCULAR; INTRAVENOUS EVERY 5 MIN PRN
Status: DISCONTINUED | OUTPATIENT
Start: 2023-10-19 | End: 2023-10-19 | Stop reason: HOSPADM

## 2023-10-19 RX ORDER — LEVOTHYROXINE SODIUM 50 UG/1
50 TABLET ORAL
Status: DISCONTINUED | OUTPATIENT
Start: 2023-10-19 | End: 2023-10-20 | Stop reason: HOSPADM

## 2023-10-19 RX ORDER — SODIUM CHLORIDE, SODIUM LACTATE, POTASSIUM CHLORIDE, CALCIUM CHLORIDE 600; 310; 30; 20 MG/100ML; MG/100ML; MG/100ML; MG/100ML
100 INJECTION, SOLUTION INTRAVENOUS CONTINUOUS
Status: DISCONTINUED | OUTPATIENT
Start: 2023-10-19 | End: 2023-10-19 | Stop reason: HOSPADM

## 2023-10-19 RX ORDER — HEPARIN SODIUM 5000 [USP'U]/ML
5000 INJECTION, SOLUTION INTRAVENOUS; SUBCUTANEOUS ONCE
Status: COMPLETED | OUTPATIENT
Start: 2023-10-19 | End: 2023-10-19

## 2023-10-19 RX ORDER — OXYCODONE AND ACETAMINOPHEN 5; 325 MG/1; MG/1
1 TABLET ORAL EVERY 6 HOURS PRN
Status: DISCONTINUED | OUTPATIENT
Start: 2023-10-19 | End: 2023-10-20 | Stop reason: HOSPADM

## 2023-10-19 RX ORDER — ACETAMINOPHEN 325 MG/1
650 TABLET ORAL EVERY 4 HOURS PRN
Status: DISCONTINUED | OUTPATIENT
Start: 2023-10-19 | End: 2023-10-20 | Stop reason: HOSPADM

## 2023-10-19 RX ORDER — SODIUM CHLORIDE, SODIUM LACTATE, POTASSIUM CHLORIDE, CALCIUM CHLORIDE 600; 310; 30; 20 MG/100ML; MG/100ML; MG/100ML; MG/100ML
100 INJECTION, SOLUTION INTRAVENOUS CONTINUOUS
Status: DISCONTINUED | OUTPATIENT
Start: 2023-10-19 | End: 2023-10-20 | Stop reason: HOSPADM

## 2023-10-19 RX ORDER — PROPOFOL 10 MG/ML
INJECTION, EMULSION INTRAVENOUS AS NEEDED
Status: DISCONTINUED | OUTPATIENT
Start: 2023-10-19 | End: 2023-10-19

## 2023-10-19 RX ORDER — ROCURONIUM BROMIDE 10 MG/ML
INJECTION, SOLUTION INTRAVENOUS AS NEEDED
Status: DISCONTINUED | OUTPATIENT
Start: 2023-10-19 | End: 2023-10-19

## 2023-10-19 RX ORDER — CEFAZOLIN SODIUM 2 G/100ML
2 INJECTION, SOLUTION INTRAVENOUS ONCE
Status: COMPLETED | OUTPATIENT
Start: 2023-10-19 | End: 2023-10-19

## 2023-10-19 RX ORDER — FENTANYL CITRATE 50 UG/ML
INJECTION, SOLUTION INTRAMUSCULAR; INTRAVENOUS AS NEEDED
Status: DISCONTINUED | OUTPATIENT
Start: 2023-10-19 | End: 2023-10-19

## 2023-10-19 RX ADMIN — DEXAMETHASONE SODIUM PHOSPHATE 4 MG: 4 INJECTION, SOLUTION INTRAMUSCULAR; INTRAVENOUS at 13:10

## 2023-10-19 RX ADMIN — SUGAMMADEX 200 MG: 100 INJECTION, SOLUTION INTRAVENOUS at 15:10

## 2023-10-19 RX ADMIN — ROCURONIUM BROMIDE 50 MG: 10 SOLUTION INTRAVENOUS at 13:33

## 2023-10-19 RX ADMIN — HYDROMORPHONE HYDROCHLORIDE 0.5 MG: 1 INJECTION, SOLUTION INTRAMUSCULAR; INTRAVENOUS; SUBCUTANEOUS at 15:59

## 2023-10-19 RX ADMIN — PROPOFOL 50 MG: 10 INJECTION, EMULSION INTRAVENOUS at 14:28

## 2023-10-19 RX ADMIN — OXYCODONE HYDROCHLORIDE 5 MG: 5 TABLET ORAL at 18:51

## 2023-10-19 RX ADMIN — ONDANSETRON 4 MG: 2 INJECTION INTRAMUSCULAR; INTRAVENOUS at 13:10

## 2023-10-19 RX ADMIN — SODIUM CHLORIDE, SODIUM LACTATE, POTASSIUM CHLORIDE, AND CALCIUM CHLORIDE: 600; 310; 30; 20 INJECTION, SOLUTION INTRAVENOUS at 14:47

## 2023-10-19 RX ADMIN — HEPARIN SODIUM 5000 UNITS: 5000 INJECTION INTRAVENOUS; SUBCUTANEOUS at 12:06

## 2023-10-19 RX ADMIN — SODIUM CHLORIDE, SODIUM LACTATE, POTASSIUM CHLORIDE, AND CALCIUM CHLORIDE 100 ML/HR: 600; 310; 30; 20 INJECTION, SOLUTION INTRAVENOUS at 11:40

## 2023-10-19 RX ADMIN — LIDOCAINE HYDROCHLORIDE 60 MG: 20 INJECTION, SOLUTION INFILTRATION; PERINEURAL at 13:05

## 2023-10-19 RX ADMIN — ROCURONIUM BROMIDE 50 MG: 10 SOLUTION INTRAVENOUS at 13:05

## 2023-10-19 RX ADMIN — TIMOLOL MALEATE 1 DROP: 5 SOLUTION/ DROPS OPHTHALMIC at 20:59

## 2023-10-19 RX ADMIN — CEFAZOLIN SODIUM 2 G: 2 INJECTION, SOLUTION INTRAVENOUS at 13:15

## 2023-10-19 RX ADMIN — HYDROMORPHONE HYDROCHLORIDE 0.5 MG: 1 INJECTION, SOLUTION INTRAMUSCULAR; INTRAVENOUS; SUBCUTANEOUS at 15:50

## 2023-10-19 RX ADMIN — PROPOFOL 150 MG: 10 INJECTION, EMULSION INTRAVENOUS at 13:05

## 2023-10-19 RX ADMIN — FENTANYL CITRATE 50 MCG: 50 INJECTION, SOLUTION INTRAMUSCULAR; INTRAVENOUS at 15:02

## 2023-10-19 RX ADMIN — FENTANYL CITRATE 50 MCG: 50 INJECTION, SOLUTION INTRAMUSCULAR; INTRAVENOUS at 14:30

## 2023-10-19 RX ADMIN — HYDROMORPHONE HYDROCHLORIDE 0.5 MG: 1 INJECTION, SOLUTION INTRAMUSCULAR; INTRAVENOUS; SUBCUTANEOUS at 15:37

## 2023-10-19 RX ADMIN — HYDROMORPHONE HYDROCHLORIDE 0.2 MG: 0.2 INJECTION, SOLUTION INTRAMUSCULAR; INTRAVENOUS; SUBCUTANEOUS at 17:25

## 2023-10-19 RX ADMIN — HYDROMORPHONE HYDROCHLORIDE 0.2 MG: 0.2 INJECTION, SOLUTION INTRAMUSCULAR; INTRAVENOUS; SUBCUTANEOUS at 21:29

## 2023-10-19 SDOH — SOCIAL STABILITY: SOCIAL INSECURITY: ARE THERE ANY APPARENT SIGNS OF INJURIES/BEHAVIORS THAT COULD BE RELATED TO ABUSE/NEGLECT?: NO

## 2023-10-19 SDOH — HEALTH STABILITY: MENTAL HEALTH: CURRENT SMOKER: 0

## 2023-10-19 SDOH — SOCIAL STABILITY: SOCIAL INSECURITY: ARE YOU OR HAVE YOU BEEN THREATENED OR ABUSED PHYSICALLY, EMOTIONALLY, OR SEXUALLY BY ANYONE?: NO

## 2023-10-19 SDOH — SOCIAL STABILITY: SOCIAL INSECURITY: HAVE YOU HAD THOUGHTS OF HARMING ANYONE ELSE?: NO

## 2023-10-19 SDOH — SOCIAL STABILITY: SOCIAL INSECURITY: HAS ANYONE EVER THREATENED TO HURT YOUR FAMILY OR YOUR PETS?: NO

## 2023-10-19 SDOH — SOCIAL STABILITY: SOCIAL INSECURITY: DOES ANYONE TRY TO KEEP YOU FROM HAVING/CONTACTING OTHER FRIENDS OR DOING THINGS OUTSIDE YOUR HOME?: NO

## 2023-10-19 SDOH — SOCIAL STABILITY: SOCIAL INSECURITY: ABUSE: ADULT

## 2023-10-19 SDOH — SOCIAL STABILITY: SOCIAL INSECURITY: DO YOU FEEL UNSAFE GOING BACK TO THE PLACE WHERE YOU ARE LIVING?: NO

## 2023-10-19 SDOH — SOCIAL STABILITY: SOCIAL INSECURITY: WERE YOU ABLE TO COMPLETE ALL THE BEHAVIORAL HEALTH SCREENINGS?: YES

## 2023-10-19 SDOH — SOCIAL STABILITY: SOCIAL INSECURITY: DO YOU FEEL ANYONE HAS EXPLOITED OR TAKEN ADVANTAGE OF YOU FINANCIALLY OR OF YOUR PERSONAL PROPERTY?: NO

## 2023-10-19 ASSESSMENT — PAIN SCALES - GENERAL
PAINLEVEL_OUTOF10: 9
PAINLEVEL_OUTOF10: 8
PAINLEVEL_OUTOF10: 6
PAINLEVEL_OUTOF10: 8
PAINLEVEL_OUTOF10: 2
PAINLEVEL_OUTOF10: 8
PAINLEVEL_OUTOF10: 6
PAINLEVEL_OUTOF10: 2
PAINLEVEL_OUTOF10: 8
PAINLEVEL_OUTOF10: 5 - MODERATE PAIN
PAINLEVEL_OUTOF10: 7

## 2023-10-19 ASSESSMENT — LIFESTYLE VARIABLES
AUDIT-C TOTAL SCORE: 0
AUDIT-C TOTAL SCORE: 0
HOW OFTEN DO YOU HAVE A DRINK CONTAINING ALCOHOL: NEVER
HOW MANY STANDARD DRINKS CONTAINING ALCOHOL DO YOU HAVE ON A TYPICAL DAY: PATIENT DOES NOT DRINK
SKIP TO QUESTIONS 9-10: 1
HOW OFTEN DO YOU HAVE 6 OR MORE DRINKS ON ONE OCCASION: NEVER

## 2023-10-19 ASSESSMENT — COGNITIVE AND FUNCTIONAL STATUS - GENERAL
EATING MEALS: A LITTLE
TURNING FROM BACK TO SIDE WHILE IN FLAT BAD: A LITTLE
PATIENT BASELINE BEDBOUND: NO
TURNING FROM BACK TO SIDE WHILE IN FLAT BAD: A LITTLE
PERSONAL GROOMING: A LITTLE
WALKING IN HOSPITAL ROOM: A LITTLE
DRESSING REGULAR UPPER BODY CLOTHING: A LITTLE
EATING MEALS: A LITTLE
MOVING TO AND FROM BED TO CHAIR: A LITTLE
CLIMB 3 TO 5 STEPS WITH RAILING: A LOT
HELP NEEDED FOR BATHING: A LITTLE
MOBILITY SCORE: 17
DAILY ACTIVITIY SCORE: 18
MOVING FROM LYING ON BACK TO SITTING ON SIDE OF FLAT BED WITH BEDRAILS: A LITTLE
STANDING UP FROM CHAIR USING ARMS: A LITTLE
TOILETING: A LITTLE
WALKING IN HOSPITAL ROOM: A LITTLE
DAILY ACTIVITIY SCORE: 18
TOILETING: A LITTLE
MOVING TO AND FROM BED TO CHAIR: A LITTLE
DRESSING REGULAR UPPER BODY CLOTHING: A LITTLE
PERSONAL GROOMING: A LITTLE
CLIMB 3 TO 5 STEPS WITH RAILING: A LOT
MOVING FROM LYING ON BACK TO SITTING ON SIDE OF FLAT BED WITH BEDRAILS: A LITTLE
DRESSING REGULAR LOWER BODY CLOTHING: A LITTLE
HELP NEEDED FOR BATHING: A LITTLE
DRESSING REGULAR LOWER BODY CLOTHING: A LITTLE
MOBILITY SCORE: 17
STANDING UP FROM CHAIR USING ARMS: A LITTLE

## 2023-10-19 ASSESSMENT — ACTIVITIES OF DAILY LIVING (ADL)
ADEQUATE_TO_COMPLETE_ADL: YES
JUDGMENT_ADEQUATE_SAFELY_COMPLETE_DAILY_ACTIVITIES: YES
LACK_OF_TRANSPORTATION: NO
FEEDING YOURSELF: NEEDS ASSISTANCE
PATIENT'S MEMORY ADEQUATE TO SAFELY COMPLETE DAILY ACTIVITIES?: YES
WALKS IN HOME: NEEDS ASSISTANCE
TOILETING: NEEDS ASSISTANCE
HEARING - LEFT EAR: FUNCTIONAL
DRESSING YOURSELF: NEEDS ASSISTANCE
BATHING: NEEDS ASSISTANCE
GROOMING: NEEDS ASSISTANCE
LACK_OF_TRANSPORTATION: NO
HEARING - RIGHT EAR: FUNCTIONAL

## 2023-10-19 ASSESSMENT — PAIN DESCRIPTION - DESCRIPTORS
DESCRIPTORS: ACHING
DESCRIPTORS: ACHING;TENDER

## 2023-10-19 ASSESSMENT — ENCOUNTER SYMPTOMS
RESPIRATORY NEGATIVE: 1
HEMATOLOGIC/LYMPHATIC NEGATIVE: 1
EYES NEGATIVE: 1
CARDIOVASCULAR NEGATIVE: 1
GASTROINTESTINAL NEGATIVE: 1
ALLERGIC/IMMUNOLOGIC NEGATIVE: 1
PSYCHIATRIC NEGATIVE: 1
CONSTITUTIONAL NEGATIVE: 1
NEUROLOGICAL NEGATIVE: 1
MUSCULOSKELETAL NEGATIVE: 1
ENDOCRINE NEGATIVE: 1

## 2023-10-19 ASSESSMENT — PATIENT HEALTH QUESTIONNAIRE - PHQ9
SUM OF ALL RESPONSES TO PHQ9 QUESTIONS 1 & 2: 0
2. FEELING DOWN, DEPRESSED OR HOPELESS: NOT AT ALL
1. LITTLE INTEREST OR PLEASURE IN DOING THINGS: NOT AT ALL

## 2023-10-19 ASSESSMENT — PAIN - FUNCTIONAL ASSESSMENT
PAIN_FUNCTIONAL_ASSESSMENT: 0-10

## 2023-10-19 NOTE — ANESTHESIA POSTPROCEDURE EVALUATION
Patient: Laura Woodward    Procedure Summary       Date: 10/19/23 Room / Location: [facility] OR 01 / [facility]    Anesthesia Start: 1258 Anesthesia Stop: 1521    Procedure: LAPAROSCOPIC VENTRAL HERNIA REPAIR Diagnosis:     Surgeons: Mary Carmen Najera MD Responsible Provider: Shade Lane MD    Anesthesia Type: general ASA Status: 3            Anesthesia Type: general    Anesthesia Post Evaluation    Patient location during evaluation: PACU  Patient participation: complete - patient participated  Level of consciousness: awake and alert  Pain management: adequate  Airway patency: patent  Cardiovascular status: acceptable  Respiratory status: acceptable  Hydration status: acceptable        No notable events documented.

## 2023-10-19 NOTE — ANESTHESIA PREPROCEDURE EVALUATION
Laura Woodward is a 88 y.o. 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 Left ventricular systolic function is normal with a 60% estimated ejection fraction.  2. Spectral Doppler shows an impaired relaxation pattern of left ventricular diastolic filling.  3. There is moderate concentric left ventricular hypertrophy.  4. There is no evidence of mitral valve stenosis.  5. Trace mitral valve regurgitation.  6. Moderate tricuspid regurgitation.  7. Moderate aortic valve stenosis (JAY 1.03, 24/17)  8. The aortic valve appears tricuspid with restriction.  9. There is moderate aortic valve cusp calcification.  10. Moderately elevated pulmonary artery pressure.    NM Stress 10/2023:  IMPRESSION:  Normal Lexiscan Myoview cardiac perfusion stress test.  No evidence of ischemia or myocardial infarction by perfusion imaging.  Normal left ventricular systolic function, ejection fraction 66%.  Noninvasive risk stratification: Low risk.  No previous available for comparison.          Social  History     Tobacco Use   Smoking Status Former    Types: Cigarettes   Smokeless Tobacco Never       Allergies   Allergen Reactions    Augmentin [Amoxicillin-Pot Clavulanate] Nausea/vomiting       Current Outpatient Medications   Medication Instructions    acetaminophen (TYLENOL) 650 mg, oral, Every 6 hours PRN    amlodipine-olmesartan (Celia) 10-40 mg tablet 1 tablet, oral, Daily RT    ascorbic acid, vitamin C, 500 mg capsule 1 capsule, oral, Daily    aspirin (NELDA LOW DOSE ASPIRIN) 81 mg, oral, Daily    biotin 5,000 mcg, oral, Daily    celecoxib (CELEBREX) 400 mg, oral, Daily    cetirizine (ZYRTEC) 10 mg, oral, Daily    cholecalciferol (Vitamin D-3) 25 MCG (1000 UT) tablet 1 tablet, oral, Daily    cloNIDine (Catapres-TTS) 0.3 mg/24 hr patch 1 patch, transdermal, Weekly    cranberry extract-vitamin C (Azo Cranberry Plus Vit C) 250-60 mg capsule 1 capsule, oral, Daily    cyanocobalamin (Vitamin B-12) 1,000 mcg tablet 1 tablet, oral, Daily    darifenacin (ENABLEX) 15 mg, oral, Daily    docusate sodium (COLACE) 100 mg, oral, 2 times daily    estradiol (ESTRACE) 0.01 g, vaginal, Nightly, 3 x a week    folic acid (FOLVITE) 0.4 mg, oral, Daily    GARLIC ORAL 2,400 mg, oral, Daily    latanoprost (Xalatan) 0.005 % ophthalmic solution 1 drop, Both Eyes, Nightly    levothyroxine (SYNTHROID, LEVOXYL) 50 mcg, oral, Daily RT    magnesium oxide (Mag-Ox) 400 mg (241.3 mg magnesium) tablet 1 tablet, oral, Daily    omega-3 acid ethyl esters (LOVAZA) 1 g, oral, 2 times daily    oxyCODONE-acetaminophen (Percocet) 5-325 mg tablet 1 tablet, oral, Every 6 hours PRN    Revlimid 5 mg, oral, Daily, As directed per oncology    terconazole (Terazol 7) 0.4 % vaginal cream 1 applicator, vaginal, Nightly, Every night for 7 days    timolol (Timoptic) 0.5 % ophthalmic solution 1 drop, ophthalmic (eye), Every 12 hours    trospium (SANCTURA) 20 mg, oral, Every 12 hours    valACYclovir (VALTREX) 500 mg, oral, Daily, 1/2 tablet twice daily 3 times a  week. M,W,F     vitamin A 8,000 Units, oral, Daily    vitamin E 400 Units, oral, Daily       Past Surgical History:   Procedure Laterality Date    OTHER SURGICAL HISTORY  06/06/2022    Colonoscopy    OTHER SURGICAL HISTORY  10/12/2021    Tonsillectomy    OTHER SURGICAL HISTORY  10/12/2021    Cholecystectomy    OTHER SURGICAL HISTORY  10/12/2021    Sinus surgery    OTHER SURGICAL HISTORY  10/12/2021    Appendectomy    OTHER SURGICAL HISTORY  10/12/2021    Hernia repair    OTHER SURGICAL HISTORY  10/12/2021    Bladder surgery    OTHER SURGICAL HISTORY  10/12/2021    Knee replacement    OTHER SURGICAL HISTORY  10/12/2021    Cataract surgery    OTHER SURGICAL HISTORY  10/12/2021    Dilation and curettage    OTHER SURGICAL HISTORY  10/12/2021    Revision knee arthroplasty    OTHER SURGICAL HISTORY  10/12/2021    Neck surgery    OTHER SURGICAL HISTORY  10/12/2021    Carpal tunnel surgery       Family History   Problem Relation Name Age of Onset    Hypertension Mother      Glaucoma Mother         NPO Details:  NPO/Void Status  Date of Last Liquid: 10/18/23  Time of Last Liquid: 0800  Date of Last Solid: 10/18/23  Time of Last Solid: 1700        Physical Exam    Airway  Mallampati: II  TM distance: >3 FB  Neck ROM: limited     Cardiovascular    Dental - normal exam     Pulmonary    Abdominal            Anesthesia Plan    ASA 3     general     The patient is not a current smoker.    intravenous induction   Postoperative administration of opioids is intended.  Trial extubation is planned.  Anesthetic plan and risks discussed with patient.    Plan discussed with CRNA.

## 2023-10-19 NOTE — DISCHARGE INSTRUCTIONS
No lifting greater than 20 pounds x 8 weeks  Okay to shower  Abdominal binder when out of bed  Diet as tolerated

## 2023-10-19 NOTE — ANESTHESIA PROCEDURE NOTES
Airway  Date/Time: 10/19/2023 1:08 PM  Urgency: elective    Difficult airway    Staffing  Performed: CRNA   Authorized by: Shade Lane MD    Performed by: ARCADIO Sumner-MARTHA  Patient location during procedure: OR    Indications and Patient Condition  Indications for airway management: anesthesia  Spontaneous ventilation: present  Sedation level: deep  Preoxygenated: yes  Patient position: sniffing  MILS maintained throughout  Mask difficulty assessment: 2 - vent by mask + OA or adjuvant +/- NMBA  Planned trial extubation    Final Airway Details  Final airway type: endotracheal airway      Successful airway: ETT  Cuffed: yes   Successful intubation technique: video laryngoscopy  Facilitating devices/methods: intubating stylet  Endotracheal tube insertion site: oral  Blade: Adrian  Blade size: #3  ETT size (mm): 7.0  Cormack-Lehane Classification: grade I - full view of glottis  Placement verified by: capnometry   Measured from: lips  ETT to lips (cm): 21  Number of attempts at approach: 1

## 2023-10-19 NOTE — H&P
History Of Present Illness  Laura Woodward is a 88 y.o. female presenting with ventral hernia.     Past Medical History  Past Medical History:   Diagnosis Date    Encounter for preprocedural cardiovascular examination 01/31/2017    Pre-operative cardiovascular examination    HL (hearing loss)     Patient states hard of hearing    Lymphedema, not elsewhere classified 12/16/2022    Lymphedema    Obesity, unspecified 11/10/2022    Class 1 obesity with body mass index (BMI) of 33.0 to 33.9 in adult    Obesity, unspecified 10/27/2022    Class 1 obesity with body mass index (BMI) of 34.0 to 34.9 in adult    Other bursitis of hip, left hip 10/12/2021    Hip bursitis, left    Other conditions influencing health status 01/31/2017    Cardiovascular Stress Test    Other seasonal allergic rhinitis 11/10/2022    Seasonal allergies    Other specified noninflammatory disorders of vagina 06/23/2022    Vaginal lesion    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    History of malignant lymphoma    Personal history of non-Hodgkin lymphomas     History of lymphoma    Personal history of other diseases of the circulatory system 01/25/2017    History of abnormal electrocardiography    Personal history of other diseases of the circulatory system     History of coronary artery disease    Personal history of other diseases of the digestive system 04/20/2022    History of rectal bleeding    Personal history of other diseases of the digestive system 06/06/2022    History of rectal bleeding    Personal history of other diseases of the digestive system 06/06/2022    History of constipation    Personal history of other diseases of the digestive system 04/19/2022    History of anal fissures    Personal history of other diseases of the respiratory system 10/27/2022    History of upper respiratory infection    Personal history of other diseases of urinary system 12/16/2022    History of renal insufficiency syndrome    Personal  history of other endocrine, nutritional and metabolic disease 10/12/2021    History of obesity    Personal history of other infectious and parasitic diseases 06/23/2022    History of herpes simplex infection    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    History of other malignant neoplasm of large intestine    Personal history of other specified conditions 06/23/2022    History of urinary frequency    Personal history of other specified conditions 06/23/2022    History of gross hematuria    Personal history of other specified conditions 01/13/2022    History of dysuria    Personal history of other specified conditions 01/04/2022    History of dysuria    Persons encountering health services in other specified circumstances 10/12/2021    Encounter to establish care with new doctor    Subacute and chronic vaginitis 01/13/2022    Chronic vaginitis    Subacute and chronic vaginitis 01/04/2022    Subacute and chronic vaginitis    Trochanteric bursitis, left hip 06/06/2022    Greater trochanteric bursitis of left hip    Unspecified disorder of synovium and tendon, left thigh 06/06/2022    Tendinopathy of left gluteus medius    Unspecified disorder of synovium and tendon, left thigh 10/12/2021    Tendinopathy of left gluteus medius    Unspecified symptoms and signs involving the genitourinary system 01/04/2022    UTI symptoms    Unspecified symptoms and signs involving the genitourinary system     UTI symptoms    Urge incontinence 06/23/2022    Urge incontinence       Surgical History  Past Surgical History:   Procedure Laterality Date    OTHER SURGICAL HISTORY  06/06/2022    Colonoscopy    OTHER SURGICAL HISTORY  10/12/2021    Tonsillectomy    OTHER SURGICAL HISTORY  10/12/2021    Cholecystectomy    OTHER SURGICAL HISTORY  10/12/2021    Sinus surgery    OTHER SURGICAL HISTORY  10/12/2021    Appendectomy    OTHER SURGICAL HISTORY  10/12/2021    Hernia repair    OTHER SURGICAL HISTORY   10/12/2021    Bladder surgery    OTHER SURGICAL HISTORY  10/12/2021    Knee replacement    OTHER SURGICAL HISTORY  10/12/2021    Cataract surgery    OTHER SURGICAL HISTORY  10/12/2021    Dilation and curettage    OTHER SURGICAL HISTORY  10/12/2021    Revision knee arthroplasty    OTHER SURGICAL HISTORY  10/12/2021    Neck surgery    OTHER SURGICAL HISTORY  10/12/2021    Carpal tunnel surgery        Social History  She reports that she has quit smoking. Her smoking use included cigarettes. She has never used smokeless tobacco. She reports that she does not currently use alcohol. She reports that she does not use drugs.    Family History  Family History   Problem Relation Name Age of Onset    Hypertension Mother      Glaucoma Mother          Allergies  Augmentin [amoxicillin-pot clavulanate]    Review of Systems   Constitutional: Negative.    HENT: Negative.     Eyes: Negative.    Respiratory: Negative.     Cardiovascular: Negative.    Gastrointestinal: Negative.    Endocrine: Negative.    Genitourinary: Negative.    Musculoskeletal: Negative.    Skin: Negative.    Allergic/Immunologic: Negative.    Neurological: Negative.    Hematological: Negative.    Psychiatric/Behavioral: Negative.          Physical Exam  Constitutional:       General: She is not in acute distress.     Appearance: Normal appearance.   HENT:      Head: Normocephalic and atraumatic.      Mouth/Throat:      Pharynx: Oropharynx is clear.   Eyes:      Conjunctiva/sclera: Conjunctivae normal.      Pupils: Pupils are equal, round, and reactive to light.   Cardiovascular:      Rate and Rhythm: Normal rate and regular rhythm.   Pulmonary:      Effort: Pulmonary effort is normal.   Abdominal:      General: Abdomen is flat. There is no distension.      Palpations: Abdomen is soft.      Hernia: A hernia is present.   Musculoskeletal:         General: Normal range of motion.   Skin:     General: Skin is warm and dry.   Neurological:      General: No focal  "deficit present.      Mental Status: She is alert. Mental status is at baseline.   Psychiatric:         Mood and Affect: Mood normal.         Behavior: Behavior normal.         Thought Content: Thought content normal.         Judgment: Judgment normal.          Last Recorded Vitals  Height 1.753 m (5' 9\"), weight 104 kg (230 lb).    Relevant Results             Assessment/Plan   Active Problems:  There are no active Hospital Problems.       laparoscopic ventral hernia repair       I spent  minutes in the professional and overall care of this patient.      Mary Carmen Najera MD    "

## 2023-10-20 VITALS
DIASTOLIC BLOOD PRESSURE: 70 MMHG | WEIGHT: 229.28 LBS | TEMPERATURE: 97.2 F | RESPIRATION RATE: 18 BRPM | HEIGHT: 69 IN | HEART RATE: 85 BPM | BODY MASS INDEX: 33.96 KG/M2 | OXYGEN SATURATION: 94 % | SYSTOLIC BLOOD PRESSURE: 132 MMHG

## 2023-10-20 PROBLEM — K43.2 VENTRAL HERNIA, RECURRENT: Status: ACTIVE | Noted: 2023-10-20

## 2023-10-20 PROCEDURE — 7100000011 HC EXTENDED STAY RECOVERY HOURLY - NURSING UNIT

## 2023-10-20 PROCEDURE — 2500000001 HC RX 250 WO HCPCS SELF ADMINISTERED DRUGS (ALT 637 FOR MEDICARE OP): Performed by: SURGERY

## 2023-10-20 PROCEDURE — 2500000001 HC RX 250 WO HCPCS SELF ADMINISTERED DRUGS (ALT 637 FOR MEDICARE OP): Performed by: STUDENT IN AN ORGANIZED HEALTH CARE EDUCATION/TRAINING PROGRAM

## 2023-10-20 PROCEDURE — 2500000004 HC RX 250 GENERAL PHARMACY W/ HCPCS (ALT 636 FOR OP/ED): Performed by: SURGERY

## 2023-10-20 RX ORDER — METRONIDAZOLE 500 MG/1
500 TABLET ORAL 2 TIMES DAILY
Qty: 14 TABLET | Refills: 0 | Status: SHIPPED | OUTPATIENT
Start: 2023-10-20 | End: 2023-10-27

## 2023-10-20 RX ADMIN — OXYCODONE HYDROCHLORIDE 5 MG: 5 TABLET ORAL at 00:18

## 2023-10-20 RX ADMIN — LEVOTHYROXINE SODIUM 50 MCG: 50 TABLET ORAL at 06:09

## 2023-10-20 RX ADMIN — TIMOLOL MALEATE 1 DROP: 5 SOLUTION/ DROPS OPHTHALMIC at 09:25

## 2023-10-20 RX ADMIN — OXYCODONE HYDROCHLORIDE AND ACETAMINOPHEN 1 TABLET: 5; 325 TABLET ORAL at 12:25

## 2023-10-20 RX ADMIN — HYDROMORPHONE HYDROCHLORIDE 0.2 MG: 0.2 INJECTION, SOLUTION INTRAMUSCULAR; INTRAVENOUS; SUBCUTANEOUS at 04:58

## 2023-10-20 RX ADMIN — OXYCODONE HYDROCHLORIDE AND ACETAMINOPHEN 1 TABLET: 5; 325 TABLET ORAL at 06:09

## 2023-10-20 ASSESSMENT — COGNITIVE AND FUNCTIONAL STATUS - GENERAL
HELP NEEDED FOR BATHING: A LITTLE
DAILY ACTIVITIY SCORE: 19
WALKING IN HOSPITAL ROOM: A LITTLE
TOILETING: A LITTLE
PERSONAL GROOMING: A LITTLE
TURNING FROM BACK TO SIDE WHILE IN FLAT BAD: A LITTLE
MOVING TO AND FROM BED TO CHAIR: A LITTLE
MOBILITY SCORE: 17
MOVING FROM LYING ON BACK TO SITTING ON SIDE OF FLAT BED WITH BEDRAILS: A LITTLE
EATING MEALS: A LITTLE
STANDING UP FROM CHAIR USING ARMS: A LITTLE
DRESSING REGULAR LOWER BODY CLOTHING: A LITTLE
CLIMB 3 TO 5 STEPS WITH RAILING: A LOT

## 2023-10-20 ASSESSMENT — PAIN - FUNCTIONAL ASSESSMENT: PAIN_FUNCTIONAL_ASSESSMENT: 0-10

## 2023-10-20 ASSESSMENT — PAIN SCALES - GENERAL
PAINLEVEL_OUTOF10: 0 - NO PAIN
PAINLEVEL_OUTOF10: 9
PAINLEVEL_OUTOF10: 6
PAINLEVEL_OUTOF10: 5 - MODERATE PAIN
PAINLEVEL_OUTOF10: 5 - MODERATE PAIN
PAINLEVEL_OUTOF10: 0 - NO PAIN

## 2023-10-20 ASSESSMENT — ACTIVITIES OF DAILY LIVING (ADL): LACK_OF_TRANSPORTATION: NO

## 2023-10-20 NOTE — DISCHARGE SUMMARY
Discharge Diagnosis  Ventral hernia, recurrent    Issues Requiring Follow-Up  Follow up with Dr. Najera in 2 weeks for recheck of your hernia repair incisions.    Test Results Pending At Discharge  Pending Labs       No current pending labs.            Hospital Course   88 year old female presented with a ventral hernia. After risks versus benefits were discussed it was decided to proceed with a ventral hernia repair. There were no complications. The patient was discharged. She will follow up with Dr. Najera in 2 weeks.      Home Medications     Medication List      START taking these medications     metroNIDAZOLE 500 mg tablet; Commonly known as: FlagyL; Take 1 tablet   (500 mg) by mouth 2 times a day for 7 days.   oxyCODONE-acetaminophen 5-325 mg tablet; Commonly known as: Percocet;   Take 1 tablet by mouth every 6 hours if needed for severe pain (7 - 10)   (G89.1) for up to 5 days.     CONTINUE taking these medications     acetaminophen 325 mg tablet; Commonly known as: Tylenol   amlodipine-olmesartan 10-40 mg tablet; Commonly known as: Celia; Take 1   tablet by mouth once daily.   ascorbic acid (vitamin C) 500 mg capsule   Azo Cranberry Plus Vit C 250-60 mg capsule; Generic drug: cranberry   extract-vitamin C   Beulah Low Dose Aspirin 81 mg EC tablet; Generic drug: aspirin   biotin 5,000 mcg tablet,chewable   celecoxib 200 mg capsule; Commonly known as: CeleBREX; Take 2 capsules   (400 mg) by mouth once daily.   cetirizine 10 mg chewable tablet; Commonly known as: ZyrTEC   cholecalciferol 25 MCG (1000 UT) tablet; Commonly known as: Vitamin D-3   cloNIDine 0.3 mg/24 hr patch; Commonly known as: Catapres-TTS; Place 1   patch on the skin 1 (one) time per week.   cyanocobalamin 1,000 mcg tablet; Commonly known as: Vitamin B-12   darifenacin 15 mg 24 hr tablet; Commonly known as: Enablex; Take 1   tablet (15 mg) by mouth once daily.   docusate sodium 100 mg capsule; Commonly known as: Colace   estradiol 0.01 % (0.1  mg/gram) vaginal cream; Commonly known as: Estrace   folic acid 400 mcg tablet; Commonly known as: Folvite   GARLIC ORAL   latanoprost 0.005 % ophthalmic solution; Commonly known as: Xalatan   levothyroxine 50 mcg tablet; Commonly known as: Synthroid, Levoxyl; Take   1 tablet (50 mcg) by mouth once daily.   magnesium oxide 400 mg (241.3 mg magnesium) tablet; Commonly known as:   Mag-Ox   omega-3 acid ethyl esters 1 gram capsule; Commonly known as: Lovaza   Revlimid 5 mg capsule; Generic drug: lenalidomide   terconazole 0.4 % vaginal cream; Commonly known as: Terazol 7   timolol 0.5 % ophthalmic solution; Commonly known as: Timoptic   trospium 20 mg tablet; Commonly known as: Sanctura   valACYclovir 500 mg tablet; Commonly known as: Valtrex   vitamin A 2,400 mcg capsule   vitamin E 180 mg (400 unit) capsule       Outpatient Follow-Up  Future Appointments   Date Time Provider Department Center   1/16/2024  1:00 PM MD [facility] [place]       Tori Hyman PA-C

## 2023-10-20 NOTE — NURSING NOTE
Follow up appointment to be made. Reviewed incision care and monitoring for signs of infection. Will wear abdominal binder when out of bed.  Reviewed medication administration and possible side effects.

## 2023-10-20 NOTE — PROGRESS NOTES
10/20/23 1038   Discharge Planning   Living Arrangements Children  (lives with daughter)   Support Systems Children   Assistance Needed Ind in ADLS with assistance from daughter, shares IADLS with daughter, ambulates with rollator.   Type of Residence Private residence  (1 level home with ramp entry and handicapped accessible bathroom)   Number of Stairs to Enter Residence 2   Number of Stairs Within Residence 0   Do you have animals or pets at home? No   Home or Post Acute Services None   Patient expects to be discharged to: Home   Does the patient need discharge transport arranged? No   Financial Resource Strain   How hard is it for you to pay for the very basics like food, housing, medical care, and heating? Not hard   Housing Stability   In the last 12 months, was there a time when you were not able to pay the mortgage or rent on time? N   In the last 12 months, how many places have you lived? 1   In the last 12 months, was there a time when you did not have a steady place to sleep or slept in a shelter (including now)? N   Transportation Needs   In the past 12 months, has lack of transportation kept you from medical appointments or from getting medications? no   In the past 12 months, has lack of transportation kept you from meetings, work, or from getting things needed for daily living? No     Pt is s/p elective laparoscopic ventral hernia repair, DC preference is home no needs, CT team will follow for potential DC needs.

## 2023-10-20 NOTE — OP NOTE
"LAPAROSCOPIC VENTRAL HERNIA REPAIR Operative Note     Date: 10/19/2023  OR Location: [facility]    Name: Laura Woodward, : 1935, Age: 88 y.o., MRN: [id number], Sex: female    Diagnosis  * No Diagnosis Codes entered * * No Diagnosis Codes entered *     Procedures    * LAPAROSCOPIC VENTRAL HERNIA REPAIR    Surgeons      * Mary Carmen Najera - Primary    Resident/Fellow/Other Assistant:  Surgeon(s) and Role:    Procedure Summary  Anesthesia: General  ASA: III  Anesthesia Staff: Anesthesiologist: Shade Lane MD  CRNA: ARCADIO Sumner-CRNA  Estimated Blood Loss: 0mL  Intra-op Medications:   Medication Name Total Dose   BUPivacaine-EPINEPHrine (Marcaine w/EPI) 0.25 %-1:200,000 injection 5 mL   lactated Ringer's infusion Cannot be calculated   ceFAZolin in dextrose (iso-os) (Ancef) IVPB 2 g 2 g              Anesthesia Record               Intraprocedure I/O Totals          Intake    lactated Ringer's infusion 1200.00 mL    Total Intake 1200 mL          Specimen: No specimens collected     Staff:   Circulator: Akin Vigil RN  Scrub Person: Tiffanie Diaz         Drains and/or Catheters:   External Urinary Catheter Female (Active)       Tourniquet Times:         Implants:  Implants       Type Name Action Serial No.      Surgical Mesh Sling Implant MESH, VENTRALIGHT W/ECHO 4\" X 6\" OVAL ([id number]) - [id number] Implanted               Findings: report    Indications: Laura Woodward is an 88 y.o. female who is having surgery for * No pre-op diagnosis entered *.  Ventral hernia    The patient was seen in the preoperative area. The risks, benefits, complications, treatment options, non-operative alternatives, expected recovery and outcomes were discussed with the patient. The possibilities of reaction to medication, pulmonary aspiration, injury to surrounding structures, bleeding, recurrent infection, the need for additional procedures, failure to diagnose a condition, and creating a complication requiring " transfusion or operation were discussed with the patient. The patient concurred with the proposed plan, giving informed consent.  The site of surgery was properly noted/marked if necessary per policy.   Procedure Details: After satisfactory induction of anesthesia, patient was prepped and draped.  Local anesthesia was infiltrated.  Left upper quadrant limited incision was made and dissection carried down through the oblique layers to the level of the peritoneum which was then sharply incised.  Finger sweep revealed no adhesions in this location.  Echeverria trocar was placed and pneumoperitoneum was established.  Under direct visualization 2 additional 5 mm trocars were placed in the left midabdomen and epigastric region.  Initial survey revealed extensive omental adhesions across the upper abdomen and loops of the small bowel adhesed to the underside of the abdominal wall along the lower portion of the known umbilical hernia.  Careful meticulous dissection with the LigaSure device and sharp dissection was carried out to perform a complete adhesiolysis.  Omentum and bowel carefully inspected and found to be free of iatrogenic injury.  This was confirmed again with inspection at the conclusion of the case.  Under reduced intra-abdominal pressure, hernia defects x2 were measured with the umbilical defect measuring 3 x 3 cm and the supraumbilical diastases component measuring 3 x 3 cm.  Small fascial bridge encompassed this area for a total span of 8 x 3 cm.  Cautery was used to ablate the hernia sac and stab incisions were made overlying each hernia.  Roger Amaro device was passed to allow for transfascial closure of the hernia defects with 0 Ethibond sutures interrupted.  Excellent reapproximation noted.  A 10 x 15 cm dual sided mesh with echo positioning device was chosen for repair.  This was rolled and passed into the abdomen via the Echeverria trocar, unrolled and centered between the fascial defects through an  additional pass of the Roger Amaro device.  This allowed for excellent central positioning of the mesh encompassing each hernia defect.  Articulating Reliatack tacking device used for mesh fixation on the underside of the abdominal wall.  Echo device removed intact.  Hemostasis assured.  The remainder laparoscopy was unremarkable.  Trocars removed under direct visualization.  Fascial defect reapproximated with 0 Vicryl suture in a figure-of-eight manner skin of all sites reapproximated Monocryl.  Dressings applied.  Patient awakened from anesthesia and transferred to recovery satisfactory condition, tolerated well.  Complications:  None; patient tolerated the procedure well.    Disposition: PACU - hemodynamically stable.  Condition: stable         Additional Details:     Attending Attestation:     Mary Carmen Najera  Phone Number: [phone]

## 2023-10-20 NOTE — CARE PLAN
The patient's goals for the shift include      The clinical goals for the shift include pain control      Problem: Pain  Goal: Takes deep breaths with improved pain control throughout the shift  Outcome: Progressing  Goal: Turns in bed with improved pain control throughout the shift  Outcome: Progressing  Goal: Walks with improved pain control throughout the shift  Outcome: Progressing  Goal: Performs ADL's with improved pain control throughout shift  Outcome: Progressing  Goal: Participates in PT with improved pain control throughout the shift  Outcome: Progressing  Goal: Free from opioid side effects throughout the shift  Outcome: Progressing  Goal: Free from acute confusion related to pain meds throughout the shift  Outcome: Progressing

## 2023-10-20 NOTE — PROGRESS NOTES
"Laura Woodward is a 88 y.o. female on day 0 of admission presenting with Ventral hernia, recurrent.     Subjective   The patient is resting comfortably in bed. She states her post-op pain is under control. She has not ambulated yet.       Objective     Physical Exam  Vitals reviewed.   Constitutional:       General: She is not in acute distress.  HENT:      Head: Normocephalic and atraumatic.      Mouth/Throat:      Mouth: Mucous membranes are moist.   Eyes:      Conjunctiva/sclera: Conjunctivae normal.   Cardiovascular:      Rate and Rhythm: Normal rate and regular rhythm.   Pulmonary:      Effort: Pulmonary effort is normal. No respiratory distress.      Breath sounds: Normal breath sounds.   Abdominal:      General: Abdomen is flat.      Palpations: Abdomen is soft.      Tenderness: There is generalized abdominal tenderness (expected post-op). There is no guarding.      Comments: Laparotomy incisions are clean, dry, and intact.   Musculoskeletal:         General: No swelling.   Skin:     General: Skin is warm and dry.      Capillary Refill: Capillary refill takes less than 2 seconds.   Neurological:      Mental Status: She is alert.   Psychiatric:         Mood and Affect: Mood normal.         Behavior: Behavior normal.         Last Recorded Vitals  Blood pressure 132/70, pulse 85, temperature 36.2 °C (97.2 °F), resp. rate 18, height 1.753 m (5' 9\"), weight 104 kg (229 lb 4.5 oz), SpO2 94 %.  Intake/Output last 3 Shifts:  I/O last 3 completed shifts:  In: 1200 (11.5 mL/kg) [I.V.:1200 (11.5 mL/kg)]  Out: - (0 mL/kg)   Dosing Weight: 104 kg     Relevant Results  Lab Results   Component Value Date    WBC 4.0 (L) 09/28/2023    HGB 11.5 (L) 09/28/2023    HCT 35.9 (L) 09/28/2023     (H) 09/28/2023     (L) 09/28/2023     Lab Results   Component Value Date    GLUCOSE 79 10/09/2023    CALCIUM 9.4 10/09/2023     10/09/2023    K 4.4 10/09/2023    CO2 25 10/09/2023     (H) 10/09/2023    BUN 18 " 10/09/2023    CREATININE 1.05 10/09/2023            Assessment/Plan   Subjective  Pain control  Nausea control  DVT prophylaxis- heparin pre-procedure  Regular diet  Encourage ambulation  Plan for discharge today    Further recommendations per Dr. Najera    Principal Problem:    Ventral hernia, recurrent        Tori CONY Hyman PA-C

## 2023-11-26 DIAGNOSIS — E03.9 HYPOTHYROIDISM, UNSPECIFIED TYPE: ICD-10-CM

## 2023-11-26 DIAGNOSIS — I10 ESSENTIAL HYPERTENSION: ICD-10-CM

## 2023-11-27 RX ORDER — CLONIDINE 0.3 MG/24H
PATCH, EXTENDED RELEASE TRANSDERMAL
Qty: 4 PATCH | Refills: 1 | Status: SHIPPED | OUTPATIENT
Start: 2023-11-27 | End: 2024-02-12

## 2023-11-27 RX ORDER — AMLODIPINE AND OLMESARTAN MEDOXOMIL 10; 40 MG/1; MG/1
1 TABLET ORAL
Qty: 90 TABLET | Refills: 1 | Status: SHIPPED | OUTPATIENT
Start: 2023-11-27 | End: 2024-03-21 | Stop reason: SDUPTHER

## 2023-11-27 RX ORDER — LEVOTHYROXINE SODIUM 50 UG/1
50 TABLET ORAL DAILY
Qty: 90 TABLET | Refills: 1 | Status: SHIPPED | OUTPATIENT
Start: 2023-11-27 | End: 2024-03-21 | Stop reason: SDUPTHER

## 2023-12-22 DIAGNOSIS — M19.90 ARTHRITIS: ICD-10-CM

## 2023-12-22 RX ORDER — DARIFENACIN 15 MG/1
15 TABLET, EXTENDED RELEASE ORAL DAILY
Qty: 90 TABLET | Refills: 3 | Status: SHIPPED | OUTPATIENT
Start: 2023-12-22

## 2023-12-24 DIAGNOSIS — M19.90 ARTHRITIS: ICD-10-CM

## 2023-12-26 RX ORDER — CELECOXIB 200 MG/1
400 CAPSULE ORAL DAILY
Qty: 90 CAPSULE | Refills: 7 | Status: SHIPPED | OUTPATIENT
Start: 2023-12-26 | End: 2024-03-02 | Stop reason: HOSPADM

## 2023-12-29 DIAGNOSIS — R32 UNSPECIFIED URINARY INCONTINENCE: ICD-10-CM

## 2024-01-11 ENCOUNTER — TELEPHONE (OUTPATIENT)
Dept: PRIMARY CARE | Facility: CLINIC | Age: 89
End: 2024-01-11
Payer: MEDICARE

## 2024-01-11 NOTE — TELEPHONE ENCOUNTER
Patient's daughter Soraya called the office and advised that her mom is having a lot of pain in her right buttock area. Soraya is not sure what is going on and she has been giving her tylenol and ibuprofen. Soraya wants to know what she should do.  Patient needs to have an appointment.

## 2024-01-12 ENCOUNTER — HOSPITAL ENCOUNTER (INPATIENT)
Facility: HOSPITAL | Age: 89
LOS: 2 days | Discharge: HOME | DRG: 300 | End: 2024-01-14
Attending: EMERGENCY MEDICINE | Admitting: INTERNAL MEDICINE
Payer: MEDICARE

## 2024-01-12 ENCOUNTER — APPOINTMENT (OUTPATIENT)
Dept: RADIOLOGY | Facility: HOSPITAL | Age: 89
DRG: 300 | End: 2024-01-12
Payer: MEDICARE

## 2024-01-12 DIAGNOSIS — M54.16 LUMBAR RADICULOPATHY: ICD-10-CM

## 2024-01-12 DIAGNOSIS — R52 GENERALIZED PAIN: ICD-10-CM

## 2024-01-12 DIAGNOSIS — I82.412 ACUTE DEEP VEIN THROMBOSIS (DVT) OF FEMORAL VEIN OF LEFT LOWER EXTREMITY (MULTI): Primary | ICD-10-CM

## 2024-01-12 LAB
ALBUMIN SERPL BCP-MCNC: 4 G/DL (ref 3.4–5)
ALP SERPL-CCNC: 86 U/L (ref 33–136)
ALT SERPL W P-5'-P-CCNC: 11 U/L (ref 7–45)
ANION GAP SERPL CALC-SCNC: 14 MMOL/L (ref 10–20)
APPEARANCE UR: CLEAR
AST SERPL W P-5'-P-CCNC: 12 U/L (ref 9–39)
BASOPHILS # BLD AUTO: 0.06 X10*3/UL (ref 0–0.1)
BASOPHILS NFR BLD AUTO: 1.1 %
BILIRUB DIRECT SERPL-MCNC: 0.2 MG/DL (ref 0–0.3)
BILIRUB SERPL-MCNC: 0.7 MG/DL (ref 0–1.2)
BILIRUB UR STRIP.AUTO-MCNC: NEGATIVE MG/DL
BUN SERPL-MCNC: 17 MG/DL (ref 6–23)
BURR CELLS BLD QL SMEAR: NORMAL
CALCIUM SERPL-MCNC: 9 MG/DL (ref 8.6–10.3)
CHLORIDE SERPL-SCNC: 110 MMOL/L (ref 98–107)
CO2 SERPL-SCNC: 23 MMOL/L (ref 21–32)
COLOR UR: YELLOW
CREAT SERPL-MCNC: 1 MG/DL (ref 0.5–1.05)
EGFRCR SERPLBLD CKD-EPI 2021: 55 ML/MIN/1.73M*2
EOSINOPHIL # BLD AUTO: 0.29 X10*3/UL (ref 0–0.4)
EOSINOPHIL NFR BLD AUTO: 5.1 %
ERYTHROCYTE [DISTWIDTH] IN BLOOD BY AUTOMATED COUNT: 12.9 % (ref 11.5–14.5)
GLUCOSE SERPL-MCNC: 85 MG/DL (ref 74–99)
GLUCOSE UR STRIP.AUTO-MCNC: NEGATIVE MG/DL
HCT VFR BLD AUTO: 38.8 % (ref 36–46)
HGB BLD-MCNC: 12.1 G/DL (ref 12–16)
HOLD SPECIMEN: NORMAL
HOLD SPECIMEN: NORMAL
IMM GRANULOCYTES # BLD AUTO: 0.03 X10*3/UL (ref 0–0.5)
IMM GRANULOCYTES NFR BLD AUTO: 0.5 % (ref 0–0.9)
INR PPP: 1 (ref 0.9–1.1)
KETONES UR STRIP.AUTO-MCNC: ABNORMAL MG/DL
LEUKOCYTE ESTERASE UR QL STRIP.AUTO: NEGATIVE
LYMPHOCYTES # BLD AUTO: 1.29 X10*3/UL (ref 0.8–3)
LYMPHOCYTES NFR BLD AUTO: 22.6 %
MCH RBC QN AUTO: 35 PG (ref 26–34)
MCHC RBC AUTO-ENTMCNC: 31.2 G/DL (ref 32–36)
MCV RBC AUTO: 112 FL (ref 80–100)
MONOCYTES # BLD AUTO: 0.71 X10*3/UL (ref 0.05–0.8)
MONOCYTES NFR BLD AUTO: 12.4 %
NEUTROPHILS # BLD AUTO: 3.33 X10*3/UL (ref 1.6–5.5)
NEUTROPHILS NFR BLD AUTO: 58.3 %
NITRITE UR QL STRIP.AUTO: NEGATIVE
NRBC BLD-RTO: 0 /100 WBCS (ref 0–0)
OVALOCYTES BLD QL SMEAR: NORMAL
PH UR STRIP.AUTO: 5 [PH]
PLATELET # BLD AUTO: 171 X10*3/UL (ref 150–450)
POTASSIUM SERPL-SCNC: 3.5 MMOL/L (ref 3.5–5.3)
PROT SERPL-MCNC: 6.2 G/DL (ref 6.4–8.2)
PROT UR STRIP.AUTO-MCNC: NEGATIVE MG/DL
PROTHROMBIN TIME: 10.8 SECONDS (ref 9.8–12.8)
RBC # BLD AUTO: 3.46 X10*6/UL (ref 4–5.2)
RBC # UR STRIP.AUTO: NEGATIVE /UL
RBC MORPH BLD: NORMAL
SODIUM SERPL-SCNC: 143 MMOL/L (ref 136–145)
SP GR UR STRIP.AUTO: 1.01
UFH PPP CHRO-ACNC: 1.2 IU/ML
UFH PPP CHRO-ACNC: 1.8 IU/ML
UROBILINOGEN UR STRIP.AUTO-MCNC: <2 MG/DL
WBC # BLD AUTO: 5.7 X10*3/UL (ref 4.4–11.3)

## 2024-01-12 PROCEDURE — 85610 PROTHROMBIN TIME: CPT | Performed by: EMERGENCY MEDICINE

## 2024-01-12 PROCEDURE — 93970 EXTREMITY STUDY: CPT

## 2024-01-12 PROCEDURE — 93971 EXTREMITY STUDY: CPT | Performed by: RADIOLOGY

## 2024-01-12 PROCEDURE — 74176 CT ABD & PELVIS W/O CONTRAST: CPT

## 2024-01-12 PROCEDURE — 2500000001 HC RX 250 WO HCPCS SELF ADMINISTERED DRUGS (ALT 637 FOR MEDICARE OP): Performed by: INTERNAL MEDICINE

## 2024-01-12 PROCEDURE — 82248 BILIRUBIN DIRECT: CPT | Performed by: EMERGENCY MEDICINE

## 2024-01-12 PROCEDURE — 85025 COMPLETE CBC W/AUTO DIFF WBC: CPT | Performed by: EMERGENCY MEDICINE

## 2024-01-12 PROCEDURE — 85520 HEPARIN ASSAY: CPT | Performed by: INTERNAL MEDICINE

## 2024-01-12 PROCEDURE — 74176 CT ABD & PELVIS W/O CONTRAST: CPT | Performed by: RADIOLOGY

## 2024-01-12 PROCEDURE — 1210000001 HC SEMI-PRIVATE ROOM DAILY

## 2024-01-12 PROCEDURE — 72131 CT LUMBAR SPINE W/O DYE: CPT | Performed by: RADIOLOGY

## 2024-01-12 PROCEDURE — 2500000005 HC RX 250 GENERAL PHARMACY W/O HCPCS: Performed by: INTERNAL MEDICINE

## 2024-01-12 PROCEDURE — 85060 BLOOD SMEAR INTERPRETATION: CPT | Performed by: EMERGENCY MEDICINE

## 2024-01-12 PROCEDURE — 99223 1ST HOSP IP/OBS HIGH 75: CPT | Performed by: INTERNAL MEDICINE

## 2024-01-12 PROCEDURE — 80053 COMPREHEN METABOLIC PANEL: CPT | Performed by: EMERGENCY MEDICINE

## 2024-01-12 PROCEDURE — 2500000004 HC RX 250 GENERAL PHARMACY W/ HCPCS (ALT 636 FOR OP/ED): Performed by: EMERGENCY MEDICINE

## 2024-01-12 PROCEDURE — 36415 COLL VENOUS BLD VENIPUNCTURE: CPT | Performed by: INTERNAL MEDICINE

## 2024-01-12 PROCEDURE — 96374 THER/PROPH/DIAG INJ IV PUSH: CPT

## 2024-01-12 PROCEDURE — 81003 URINALYSIS AUTO W/O SCOPE: CPT | Performed by: EMERGENCY MEDICINE

## 2024-01-12 PROCEDURE — 36415 COLL VENOUS BLD VENIPUNCTURE: CPT | Performed by: EMERGENCY MEDICINE

## 2024-01-12 PROCEDURE — 72131 CT LUMBAR SPINE W/O DYE: CPT

## 2024-01-12 PROCEDURE — 99285 EMERGENCY DEPT VISIT HI MDM: CPT | Performed by: EMERGENCY MEDICINE

## 2024-01-12 RX ORDER — ACETAMINOPHEN 500 MG
5 TABLET ORAL NIGHTLY PRN
Status: DISCONTINUED | OUTPATIENT
Start: 2024-01-12 | End: 2024-01-14 | Stop reason: HOSPADM

## 2024-01-12 RX ORDER — ALUMINUM HYDROXIDE, MAGNESIUM HYDROXIDE, AND SIMETHICONE 1200; 120; 1200 MG/30ML; MG/30ML; MG/30ML
30 SUSPENSION ORAL 4 TIMES DAILY PRN
Status: DISCONTINUED | OUTPATIENT
Start: 2024-01-12 | End: 2024-01-14 | Stop reason: HOSPADM

## 2024-01-12 RX ORDER — TRAMADOL HYDROCHLORIDE 50 MG/1
50 TABLET ORAL EVERY 8 HOURS PRN
Status: DISCONTINUED | OUTPATIENT
Start: 2024-01-12 | End: 2024-01-14 | Stop reason: HOSPADM

## 2024-01-12 RX ORDER — ONDANSETRON HYDROCHLORIDE 2 MG/ML
4 INJECTION, SOLUTION INTRAVENOUS EVERY 4 HOURS PRN
Status: DISCONTINUED | OUTPATIENT
Start: 2024-01-12 | End: 2024-01-14 | Stop reason: HOSPADM

## 2024-01-12 RX ORDER — TIMOLOL MALEATE 5 MG/ML
1 SOLUTION/ DROPS OPHTHALMIC EVERY 12 HOURS
Status: DISCONTINUED | OUTPATIENT
Start: 2024-01-12 | End: 2024-01-14 | Stop reason: HOSPADM

## 2024-01-12 RX ORDER — AMOXICILLIN 250 MG
2 CAPSULE ORAL 2 TIMES DAILY
Status: DISCONTINUED | OUTPATIENT
Start: 2024-01-12 | End: 2024-01-14 | Stop reason: HOSPADM

## 2024-01-12 RX ORDER — LENALIDOMIDE 5 MG/1
10 CAPSULE ORAL DAILY
Status: DISCONTINUED | OUTPATIENT
Start: 2024-01-12 | End: 2024-01-14 | Stop reason: HOSPADM

## 2024-01-12 RX ORDER — LEVOTHYROXINE SODIUM 50 UG/1
50 TABLET ORAL DAILY
Status: DISCONTINUED | OUTPATIENT
Start: 2024-01-12 | End: 2024-01-14 | Stop reason: HOSPADM

## 2024-01-12 RX ORDER — KETOROLAC TROMETHAMINE 30 MG/ML
15 INJECTION, SOLUTION INTRAMUSCULAR; INTRAVENOUS EVERY 6 HOURS PRN
Status: DISCONTINUED | OUTPATIENT
Start: 2024-01-12 | End: 2024-01-13

## 2024-01-12 RX ORDER — LIDOCAINE 560 MG/1
1 PATCH PERCUTANEOUS; TOPICAL; TRANSDERMAL DAILY
Status: DISCONTINUED | OUTPATIENT
Start: 2024-01-12 | End: 2024-01-14 | Stop reason: HOSPADM

## 2024-01-12 RX ORDER — UBIDECARENONE 75 MG
1000 CAPSULE ORAL DAILY
Status: DISCONTINUED | OUTPATIENT
Start: 2024-01-12 | End: 2024-01-14 | Stop reason: HOSPADM

## 2024-01-12 RX ORDER — LATANOPROST 50 UG/ML
1 SOLUTION/ DROPS OPHTHALMIC NIGHTLY
Status: DISCONTINUED | OUTPATIENT
Start: 2024-01-12 | End: 2024-01-14 | Stop reason: HOSPADM

## 2024-01-12 RX ORDER — GLUCOSAMINE/CHONDR SU A SOD 750-600 MG
1 TABLET ORAL DAILY
Status: DISCONTINUED | OUTPATIENT
Start: 2024-01-12 | End: 2024-01-14 | Stop reason: HOSPADM

## 2024-01-12 RX ORDER — TRIAMTERENE/HYDROCHLOROTHIAZID 37.5-25 MG
1 TABLET ORAL DAILY
Status: DISCONTINUED | OUTPATIENT
Start: 2024-01-12 | End: 2024-01-14 | Stop reason: HOSPADM

## 2024-01-12 RX ORDER — LORATADINE 10 MG/1
10 TABLET ORAL DAILY
Status: DISCONTINUED | OUTPATIENT
Start: 2024-01-12 | End: 2024-01-14 | Stop reason: HOSPADM

## 2024-01-12 RX ORDER — VITAMIN E MIXED 400 UNIT
400 CAPSULE ORAL DAILY
Status: DISCONTINUED | OUTPATIENT
Start: 2024-01-12 | End: 2024-01-14 | Stop reason: HOSPADM

## 2024-01-12 RX ORDER — VALACYCLOVIR HYDROCHLORIDE 500 MG/1
500 TABLET, FILM COATED ORAL
Status: DISCONTINUED | OUTPATIENT
Start: 2024-01-12 | End: 2024-01-14 | Stop reason: HOSPADM

## 2024-01-12 RX ORDER — CLONIDINE 0.3 MG/24H
1 PATCH, EXTENDED RELEASE TRANSDERMAL
Status: DISCONTINUED | OUTPATIENT
Start: 2024-01-13 | End: 2024-01-14 | Stop reason: HOSPADM

## 2024-01-12 RX ORDER — ASCORBIC ACID 250 MG
500 TABLET ORAL DAILY
Status: DISCONTINUED | OUTPATIENT
Start: 2024-01-12 | End: 2024-01-14 | Stop reason: HOSPADM

## 2024-01-12 RX ORDER — CHOLECALCIFEROL (VITAMIN D3) 25 MCG
1000 TABLET ORAL DAILY
Status: DISCONTINUED | OUTPATIENT
Start: 2024-01-12 | End: 2024-01-14 | Stop reason: HOSPADM

## 2024-01-12 RX ORDER — ASPIRIN 81 MG/1
81 TABLET ORAL DAILY
Status: DISCONTINUED | OUTPATIENT
Start: 2024-01-12 | End: 2024-01-14 | Stop reason: HOSPADM

## 2024-01-12 RX ORDER — DOCUSATE SODIUM 100 MG/1
100 CAPSULE, LIQUID FILLED ORAL 2 TIMES DAILY
Status: DISCONTINUED | OUTPATIENT
Start: 2024-01-12 | End: 2024-01-14 | Stop reason: HOSPADM

## 2024-01-12 RX ORDER — ACETAMINOPHEN 325 MG/1
650 TABLET ORAL EVERY 4 HOURS PRN
Status: DISCONTINUED | OUTPATIENT
Start: 2024-01-12 | End: 2024-01-14 | Stop reason: HOSPADM

## 2024-01-12 RX ORDER — HEPARIN SODIUM 5000 [USP'U]/ML
80 INJECTION, SOLUTION INTRAVENOUS; SUBCUTANEOUS ONCE
Status: COMPLETED | OUTPATIENT
Start: 2024-01-12 | End: 2024-01-12

## 2024-01-12 RX ORDER — HEPARIN SODIUM 10000 [USP'U]/100ML
0-4500 INJECTION, SOLUTION INTRAVENOUS CONTINUOUS
Status: DISPENSED | OUTPATIENT
Start: 2024-01-12 | End: 2024-01-14

## 2024-01-12 RX ORDER — AMLODIPINE AND OLMESARTAN MEDOXOMIL 10; 40 MG/1; MG/1
1 TABLET ORAL
Status: DISCONTINUED | OUTPATIENT
Start: 2024-01-12 | End: 2024-01-14 | Stop reason: HOSPADM

## 2024-01-12 RX ORDER — LANOLIN ALCOHOL/MO/W.PET/CERES
400 CREAM (GRAM) TOPICAL DAILY
Status: DISCONTINUED | OUTPATIENT
Start: 2024-01-12 | End: 2024-01-14 | Stop reason: HOSPADM

## 2024-01-12 RX ORDER — MAGNESIUM HYDROXIDE 2400 MG/10ML
10 SUSPENSION ORAL DAILY PRN
Status: DISCONTINUED | OUTPATIENT
Start: 2024-01-12 | End: 2024-01-14 | Stop reason: HOSPADM

## 2024-01-12 RX ORDER — ICOSAPENT ETHYL 1 G/1
2 CAPSULE ORAL
Status: DISCONTINUED | OUTPATIENT
Start: 2024-01-12 | End: 2024-01-14 | Stop reason: HOSPADM

## 2024-01-12 RX ORDER — PLANT STANOL ESTER 450 MG
8000 TABLET ORAL DAILY
Status: DISCONTINUED | OUTPATIENT
Start: 2024-01-12 | End: 2024-01-14 | Stop reason: HOSPADM

## 2024-01-12 RX ORDER — HEPARIN SODIUM 5000 [USP'U]/ML
3000-6000 INJECTION, SOLUTION INTRAVENOUS; SUBCUTANEOUS EVERY 4 HOURS PRN
Status: DISCONTINUED | OUTPATIENT
Start: 2024-01-12 | End: 2024-01-14

## 2024-01-12 RX ADMIN — ICOSAPENT ETHYL 2 G: 1 CAPSULE ORAL at 18:39

## 2024-01-12 RX ADMIN — HEPARIN SODIUM 8250 UNITS: 5000 INJECTION INTRAVENOUS; SUBCUTANEOUS at 15:11

## 2024-01-12 RX ADMIN — TRIAMTERENE AND HYDROCHLOROTHIAZIDE 1 TABLET: 37.5; 25 TABLET ORAL at 18:36

## 2024-01-12 RX ADMIN — LATANOPROST 1 DROP: 50 SOLUTION OPHTHALMIC at 20:36

## 2024-01-12 RX ADMIN — TRAMADOL HYDROCHLORIDE 50 MG: 50 TABLET, COATED ORAL at 20:35

## 2024-01-12 RX ADMIN — HEPARIN SODIUM 1900 UNITS/HR: 10000 INJECTION, SOLUTION INTRAVENOUS at 15:21

## 2024-01-12 RX ADMIN — LIDOCAINE 1 PATCH: 4 PATCH TOPICAL at 18:36

## 2024-01-12 SDOH — SOCIAL STABILITY: SOCIAL INSECURITY: WERE YOU ABLE TO COMPLETE ALL THE BEHAVIORAL HEALTH SCREENINGS?: YES

## 2024-01-12 SDOH — SOCIAL STABILITY: SOCIAL INSECURITY: DO YOU FEEL ANYONE HAS EXPLOITED OR TAKEN ADVANTAGE OF YOU FINANCIALLY OR OF YOUR PERSONAL PROPERTY?: NO

## 2024-01-12 SDOH — SOCIAL STABILITY: SOCIAL INSECURITY: HAVE YOU HAD THOUGHTS OF HARMING ANYONE ELSE?: NO

## 2024-01-12 SDOH — SOCIAL STABILITY: SOCIAL INSECURITY: HAS ANYONE EVER THREATENED TO HURT YOUR FAMILY OR YOUR PETS?: NO

## 2024-01-12 SDOH — SOCIAL STABILITY: SOCIAL INSECURITY: DO YOU FEEL UNSAFE GOING BACK TO THE PLACE WHERE YOU ARE LIVING?: NO

## 2024-01-12 SDOH — SOCIAL STABILITY: SOCIAL INSECURITY: DOES ANYONE TRY TO KEEP YOU FROM HAVING/CONTACTING OTHER FRIENDS OR DOING THINGS OUTSIDE YOUR HOME?: NO

## 2024-01-12 SDOH — SOCIAL STABILITY: SOCIAL INSECURITY: ABUSE: ADULT

## 2024-01-12 SDOH — SOCIAL STABILITY: SOCIAL INSECURITY: ARE YOU OR HAVE YOU BEEN THREATENED OR ABUSED PHYSICALLY, EMOTIONALLY, OR SEXUALLY BY ANYONE?: NO

## 2024-01-12 SDOH — SOCIAL STABILITY: SOCIAL INSECURITY: ARE THERE ANY APPARENT SIGNS OF INJURIES/BEHAVIORS THAT COULD BE RELATED TO ABUSE/NEGLECT?: NO

## 2024-01-12 ASSESSMENT — LIFESTYLE VARIABLES
EVER HAD A DRINK FIRST THING IN THE MORNING TO STEADY YOUR NERVES TO GET RID OF A HANGOVER: NO
HOW OFTEN DO YOU HAVE 6 OR MORE DRINKS ON ONE OCCASION: NEVER
SKIP TO QUESTIONS 9-10: 1
HAVE YOU EVER FELT YOU SHOULD CUT DOWN ON YOUR DRINKING: NO
HOW OFTEN DO YOU HAVE A DRINK CONTAINING ALCOHOL: NEVER
HAVE PEOPLE ANNOYED YOU BY CRITICIZING YOUR DRINKING: NO
AUDIT-C TOTAL SCORE: 0
AUDIT-C TOTAL SCORE: 0
HOW MANY STANDARD DRINKS CONTAINING ALCOHOL DO YOU HAVE ON A TYPICAL DAY: PATIENT DOES NOT DRINK
EVER FELT BAD OR GUILTY ABOUT YOUR DRINKING: NO
REASON UNABLE TO ASSESS: NO

## 2024-01-12 ASSESSMENT — COGNITIVE AND FUNCTIONAL STATUS - GENERAL
MOVING TO AND FROM BED TO CHAIR: A LITTLE
CLIMB 3 TO 5 STEPS WITH RAILING: A LOT
WALKING IN HOSPITAL ROOM: A LITTLE
MOVING FROM LYING ON BACK TO SITTING ON SIDE OF FLAT BED WITH BEDRAILS: A LITTLE
TURNING FROM BACK TO SIDE WHILE IN FLAT BAD: A LITTLE
DAILY ACTIVITIY SCORE: 19
PATIENT BASELINE BEDBOUND: NO
DRESSING REGULAR LOWER BODY CLOTHING: A LITTLE
STANDING UP FROM CHAIR USING ARMS: A LITTLE
MOBILITY SCORE: 17
DRESSING REGULAR UPPER BODY CLOTHING: A LITTLE
PERSONAL GROOMING: A LITTLE
HELP NEEDED FOR BATHING: A LITTLE
TOILETING: A LITTLE

## 2024-01-12 ASSESSMENT — PATIENT HEALTH QUESTIONNAIRE - PHQ9
2. FEELING DOWN, DEPRESSED OR HOPELESS: NOT AT ALL
1. LITTLE INTEREST OR PLEASURE IN DOING THINGS: NOT AT ALL
SUM OF ALL RESPONSES TO PHQ9 QUESTIONS 1 & 2: 0

## 2024-01-12 ASSESSMENT — COLUMBIA-SUICIDE SEVERITY RATING SCALE - C-SSRS
6. HAVE YOU EVER DONE ANYTHING, STARTED TO DO ANYTHING, OR PREPARED TO DO ANYTHING TO END YOUR LIFE?: NO
2. HAVE YOU ACTUALLY HAD ANY THOUGHTS OF KILLING YOURSELF?: NO
1. IN THE PAST MONTH, HAVE YOU WISHED YOU WERE DEAD OR WISHED YOU COULD GO TO SLEEP AND NOT WAKE UP?: NO

## 2024-01-12 ASSESSMENT — ACTIVITIES OF DAILY LIVING (ADL)
JUDGMENT_ADEQUATE_SAFELY_COMPLETE_DAILY_ACTIVITIES: YES
GROOMING: NEEDS ASSISTANCE
LACK_OF_TRANSPORTATION: NO
HEARING - LEFT EAR: FUNCTIONAL
DRESSING YOURSELF: NEEDS ASSISTANCE
FEEDING YOURSELF: NEEDS ASSISTANCE
WALKS IN HOME: NEEDS ASSISTANCE
TOILETING: NEEDS ASSISTANCE
PATIENT'S MEMORY ADEQUATE TO SAFELY COMPLETE DAILY ACTIVITIES?: YES
HEARING - RIGHT EAR: FUNCTIONAL
BATHING: NEEDS ASSISTANCE
ADEQUATE_TO_COMPLETE_ADL: YES

## 2024-01-12 ASSESSMENT — PAIN SCALES - GENERAL
PAINLEVEL_OUTOF10: 7
PAINLEVEL_OUTOF10: 3
PAINLEVEL_OUTOF10: 7

## 2024-01-12 ASSESSMENT — PAIN DESCRIPTION - PROGRESSION: CLINICAL_PROGRESSION: NOT CHANGED

## 2024-01-12 ASSESSMENT — PAIN DESCRIPTION - LOCATION: LOCATION: BUTTOCKS

## 2024-01-12 ASSESSMENT — PAIN - FUNCTIONAL ASSESSMENT
PAIN_FUNCTIONAL_ASSESSMENT: 0-10
PAIN_FUNCTIONAL_ASSESSMENT: 0-10

## 2024-01-12 ASSESSMENT — PAIN DESCRIPTION - ORIENTATION: ORIENTATION: RIGHT

## 2024-01-12 NOTE — ED PROVIDER NOTES
HPI   Chief Complaint   Patient presents with    Tailbone Pain     Pt denies injury or fall, states it's been like that for the past three days       Patient is a 88-year-old female history of degenerative joint disease swelling chronic in her left leg bilateral knee replacements lymphedema comes in complaining of pain in the right-sided lower back, goes down the buttocks, no numbness no tingling no falls no injuries, also have increased swelling in the right leg and the left leg swelling is always there.                          No data recorded                Patient History   Past Medical History:   Diagnosis Date    Encounter for preprocedural cardiovascular examination 01/31/2017    Pre-operative cardiovascular examination    HL (hearing loss)     Patient states hard of hearing    Lymphedema, not elsewhere classified 12/16/2022    Lymphedema    Obesity, unspecified 11/10/2022    Class 1 obesity with body mass index (BMI) of 33.0 to 33.9 in adult    Obesity, unspecified 10/27/2022    Class 1 obesity with body mass index (BMI) of 34.0 to 34.9 in adult    Other bursitis of hip, left hip 10/12/2021    Hip bursitis, left    Other conditions influencing health status 01/31/2017    Cardiovascular Stress Test    Other seasonal allergic rhinitis 11/10/2022    Seasonal allergies    Other specified noninflammatory disorders of vagina 06/23/2022    Vaginal lesion    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    History of malignant lymphoma    Personal history of non-Hodgkin lymphomas     History of lymphoma    Personal history of other diseases of the circulatory system 01/25/2017    History of abnormal electrocardiography    Personal history of other diseases of the circulatory system     History of coronary artery disease    Personal history of other diseases of the digestive system 04/20/2022    History of rectal bleeding    Personal history of other diseases of the digestive system 06/06/2022    History of  rectal bleeding    Personal history of other diseases of the digestive system 06/06/2022    History of constipation    Personal history of other diseases of the digestive system 04/19/2022    History of anal fissures    Personal history of other diseases of the respiratory system 10/27/2022    History of upper respiratory infection    Personal history of other diseases of urinary system 12/16/2022    History of renal insufficiency syndrome    Personal history of other endocrine, nutritional and metabolic disease 10/12/2021    History of obesity    Personal history of other infectious and parasitic diseases 06/23/2022    History of herpes simplex infection    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    History of other malignant neoplasm of large intestine    Personal history of other specified conditions 06/23/2022    History of urinary frequency    Personal history of other specified conditions 06/23/2022    History of gross hematuria    Personal history of other specified conditions 01/13/2022    History of dysuria    Personal history of other specified conditions 01/04/2022    History of dysuria    Persons encountering health services in other specified circumstances 10/12/2021    Encounter to establish care with new doctor    Subacute and chronic vaginitis 01/13/2022    Chronic vaginitis    Subacute and chronic vaginitis 01/04/2022    Subacute and chronic vaginitis    Trochanteric bursitis, left hip 06/06/2022    Greater trochanteric bursitis of left hip    Unspecified disorder of synovium and tendon, left thigh 06/06/2022    Tendinopathy of left gluteus medius    Unspecified disorder of synovium and tendon, left thigh 10/12/2021    Tendinopathy of left gluteus medius    Unspecified symptoms and signs involving the genitourinary system 01/04/2022    UTI symptoms    Unspecified symptoms and signs involving the genitourinary system     UTI symptoms    Urge incontinence 06/23/2022     Urge incontinence     Past Surgical History:   Procedure Laterality Date    OTHER SURGICAL HISTORY  06/06/2022    Colonoscopy    OTHER SURGICAL HISTORY  10/12/2021    Tonsillectomy    OTHER SURGICAL HISTORY  10/12/2021    Cholecystectomy    OTHER SURGICAL HISTORY  10/12/2021    Sinus surgery    OTHER SURGICAL HISTORY  10/12/2021    Appendectomy    OTHER SURGICAL HISTORY  10/12/2021    Hernia repair    OTHER SURGICAL HISTORY  10/12/2021    Bladder surgery    OTHER SURGICAL HISTORY  10/12/2021    Knee replacement    OTHER SURGICAL HISTORY  10/12/2021    Cataract surgery    OTHER SURGICAL HISTORY  10/12/2021    Dilation and curettage    OTHER SURGICAL HISTORY  10/12/2021    Revision knee arthroplasty    OTHER SURGICAL HISTORY  10/12/2021    Neck surgery    OTHER SURGICAL HISTORY  10/12/2021    Carpal tunnel surgery     Family History   Problem Relation Name Age of Onset    Hypertension Mother      Glaucoma Mother       Social History     Tobacco Use    Smoking status: Former     Types: Cigarettes    Smokeless tobacco: Never   Substance Use Topics    Alcohol use: Not Currently    Drug use: Never       Physical Exam   ED Triage Vitals [01/12/24 1136]   Temp Heart Rate Resp BP   36.2 °C (97.2 °F) 82 18 (!) 182/95      SpO2 Temp src Heart Rate Source Patient Position   97 % -- -- --      BP Location FiO2 (%)     -- --       Physical Exam  Vitals and nursing note reviewed.   Cardiovascular:      Rate and Rhythm: Normal rate and regular rhythm.   Pulmonary:      Effort: Pulmonary effort is normal.   Abdominal:      Palpations: Abdomen is soft.   Musculoskeletal:         General: Swelling and tenderness present.      Cervical back: Normal range of motion.      Comments: Presence of tenderness palpation right lower lumbar paraspinal muscle, no saddle anesthesia    Left lower extremities 3+ edema with some chronic skin pigmentation and right leg is 2+ edema   Neurological:      Mental Status: She is alert.         ED Course  & MDM   Diagnoses as of 01/12/24 1413   Acute deep vein thrombosis (DVT) of femoral vein of left lower extremity (CMS/HCC)   Lumbar radiculopathy       Medical Decision Making  Differential diagnosis  Acute lumbar radiculopathy, acute lumbar fracture, acute pyelonephritis acute UTI acute renal failure imbalance, acute DVT    I ordered CBC chemistry urinalysis ultrasound of both the legs and CT scan of the lumbar spine and CT scan of the abdomen pelvis without contrast.  Further workup or management will be done based upon the results test ordered    Labs Reviewed  CBC WITH AUTO DIFFERENTIAL - Abnormal     WBC                           5.7                    nRBC                          0.0                    RBC                           3.46 (*)               Hemoglobin                    12.1                   Hematocrit                    38.8                   MCV                           112 (*)                MCH                           35.0 (*)               MCHC                          31.2 (*)               RDW                           12.9                   Platelets                     171                    Neutrophils %                 58.3                   Immature Granulocytes %, Automated   0.5                    Lymphocytes %                 22.6                   Monocytes %                   12.4                   Eosinophils %                 5.1                    Basophils %                   1.1                    Neutrophils Absolute          3.33                   Immature Granulocytes Absolute, Au*   0.03                   Lymphocytes Absolute          1.29                   Monocytes Absolute            0.71                   Eosinophils Absolute          0.29                   Basophils Absolute            0.06                BASIC METABOLIC PANEL - Abnormal     Glucose                       85                     Sodium                        143                    Potassium                      3.5                    Chloride                      110 (*)                Bicarbonate                   23                     Anion Gap                     14                     Urea Nitrogen                 17                     Creatinine                    1.00                   eGFR                          55 (*)                 Calcium                       9.0                 HEPATIC FUNCTION PANEL - Abnormal     Albumin                       4.0                    Bilirubin, Total              0.7                    Bilirubin, Direct             0.2                    Alkaline Phosphatase          86                     ALT                           11                     AST                           12                     Total Protein                 6.2 (*)             URINALYSIS WITH REFLEX CULTURE AND MICROSCOPIC - Abnormal     Color, Urine                  Yellow                 Appearance, Urine             Clear                  Specific Gravity, Urine       1.009                  pH, Urine                     5.0                    Protein, Urine                NEGATIVE                Glucose, Urine                NEGATIVE                Blood, Urine                  NEGATIVE                Ketones, Urine                5 (TRACE) (*)               Bilirubin, Urine              NEGATIVE                Urobilinogen, Urine           <2.0                   Nitrite, Urine                NEGATIVE                Leukocyte Esterase, Urine     NEGATIVE             PROTIME-INR - Normal     Protime                       10.8                   INR                           1.0                 URINALYSIS WITH REFLEX CULTURE AND MICROSCOPIC       Narrative: The following orders were created for panel order Urinalysis with Reflex Culture and Microscopic.                Procedure                               Abnormality         Status                                   ---------                                -----------         ------                                   Urinalysis with Reflex C...[[id number]]  Abnormal            Final result                             Extra Urine Gray Tube[[id number]]                            In process                                               Please view results for these tests on the individual orders.  EXTRA URINE GRAY TUBE  MORPHOLOGY     RBC Morphology                See Below                Ovalocytes                    Few                    [place] Cells                    Few                 PATH REVIEW-CBC DIFFERENTIAL     CT lumbar spine wo IV contrast   Final Result    Moderate multilevel spondylosis as described, otherwise without acute    findings.          Signed by: Timothy Cm 1/12/2024 1:12 PM    Dictation workstation:   [id number]     CT abdomen pelvis wo IV contrast   Final Result    1.  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    2. Probable constipation. Additional findings as above.          MACRO:    1. None          Signed by: Timothy Cm 1/12/2024 1:31 PM    Dictation workstation:   [id number]     Vascular US lower extremity venous duplex bilateral   Final Result    Findings consistent with deep vein thrombosis throughout the left    femoral vein, as above.          MACRO:    None          Signed by: Everett Schwartz 1/12/2024 12:44 PM    Dictation workstation:   [id number]       Since ultrasound done showed a significant deep venous thrombosis of the left femoral vein and the whole leg, at this time patient has a large clot burden with a history with her history and her age is a high risk factor for bleeding complications and will need to be admitted to the hospital to be put on IV heparin and further intervention as necessary        Procedure  Procedures     Diane Torrez MD  01/12/24 [id number]

## 2024-01-12 NOTE — CARE PLAN
The patient's goals for the shift include less swelling and pain     The clinical goals for the shift include  Pt will maintain therapeutic hepatin levels     Over the shift, the patient did not make progress toward the following goals. Barriers to progression include none identified. Recommendations to address these barriers include initiate poc.

## 2024-01-12 NOTE — H&P
01/12/24       CHIEF COMPLAINT:      Chief Complaint   Patient presents with    Tailbone Pain     Pt denies injury or fall, states it's been like that for the past three days        PCP is Mahnaz Baca MD     History is taken from the patient who is a fairly good historian, her daughter who is in the room with her, discussion with the ER physician & ER records, [facility] outpatient medical records, [facility] medical records and records from [facility].    Laura Woodward is a 88 y.o. 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    Patient says she has been having chronic edema both lower extremities, for at least the last year.  Her daughter is having difficulty putting on the compression hose.  It seems to have gotten worse recently.  Then 3 days ago she developed sharp pains in a localized area of her low back on the right.  She is very sedentary, but the pain was so severe that she was unable to get up out of her chair.  Goes into her buttocks.  There was no history of trauma, she does not think she turned wrong.  She presented to the ER due to worsening pain.    In the ER the patient was mildly hypertensive at 182/95.  Chemistry panel was unremarkable, BUN 17 and a creatinine 1.0.  LFTs normal.  CBC showed macrocytic indices, WBC was 5.7, H/H12.1/38.8.  Platelet count 171 K.  Urinalysis was unremarkable.  CT of the lumbar spine showed severe multilevel spondylosis without acute findings.  CT of the abdomen and pelvis showed a small right pleural effusion with a fairly large central mesenteric mass.  She did have fecal residue throughout the colon consistent with constipation.  Ultrasound of her lower extremities showed extensive deep venous thrombosis throughout the left femoral vein.  Due to the extent of the clot, patient  "was started on IV heparin and admitted    ROS:   She denies any fevers, says she is \"always cold\".  No nausea or vomiting, does have chronic abdominal pain even after her ventral hernia repair.  She denies any episodes of syncope, does have some episodes where \"everything goes white\".  Denies chest pains or palpitations.  No chronic cough.  No nausea or vomiting.  She has some urinary urgency chronically, but no dysuria or hematuria.  No recent unexplained weight changes.  She has not had any recent travel, but she is very sedentary and mainly sits in a chair at home.  She does have compression hose but her daughter has problems getting them on.    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  -Chronic chemotherapy with Revlimid  -History of adeno CA of colon and a polyp on 11/2016  -CKD stage III  -DJD with knee replacements  -Female urinary incontinence with history of bladder prolapse  -Depression/anxiety  -Glaucoma    PAST SURGICAL HISTORY:     -Remote tonsillectomy age 7  -Appendectomy  -Inguinal hernia repair for strangulated hernia in 1972  -Bilateral carpal tunnel syndrome repair  -Deviated nasal septoplasty  -Bilateral cataracts >30 years ago  -Remote D&C  -Bladder suspension  -Laparoscopic cholecystectomy in the 1990s  -Bilateral total knee replacements 1999  -Placement of IVC filter postop for pulmonary emboli 1999  -Colonoscopy 6/23/2008-redundant colon  -History of left knee arthroscopy ~2011  -Revision of failed left knee replacement 1/4/2012  -I&D of left knee abscess 2/7/2012  -C5-6, C6-7 anterior cervical discectomy and fusion " 2012  -Revision of left total knee 5/15/2014  -CT-guided biopsy of abdominal mass 2015-diffuse large B-cell lymphoma  -Port placed 10/19/2015  -Colonoscopy 2016-polyp with adenocarcinoma, proctitis  -Laparoscopic to open mesenteric lymph node biopsy 2017 for recurrent DLBCL  -Colonoscopy 2017-resolved proctitis  -Colonoscopy 2022-healing anal fissure, diverticulosis, internal hemorrhoids  -Ventral hernia repair 10/19/2023    FAMILY HISTORY:   -Father- age 92, had black lung  -Mother- age 92, had HTN & glaucoma  -Siblings-3 sisters, all  in their late 80s, unknown causes  -Children-1 daughter A&W, 1 son with history of leukemia    SOCIAL HISTORY:   -Tobacco-quit smoking in  after <1 pack daily for 15 years  -Alcohol-denies  -Drugs-denied  -Marital-she is , she lives with her daughter  -Occupation-retired nurses aide    ASSESSMENT/PLAN:   -Extensive DVT left lower extremity-presently on a heparin drip.  She has significant edema bilaterally, main risk factors are her sedentary lifestyle and active lymphoma.  -Very localized area of pain in the SI area-she does have significant multilevel spondylosis with disc disease, possibly the source.  Does not radiate down her legs.  Will start on lidocaine patch, Toradol as needed.  PT/OT ordered.  -Diffuse large B-cell lymphoma on Revlimid.  Continuing her Revlimid.  -Hypertension-blood pressures were up in the ER but she was in pain, will treat with her home BP medications, as well as pain medications.  -Obesity with a BMI of 33.0  -Hypothyroidism on replacement  -Osteoporosis-no longer taking alendronate (per daughter it was stopped because she needed some dental work done).  -Reported CKD stage III-will monitor RFT's closely in setting of Toradol.  -Bilateral knee pain, L >R, s/p multiple replacements.  She does wear a knee brace.    ALLERGIES:     Allergies   Allergen Reactions    Augmentin [Amoxicillin-Pot Clavulanate]  Nausea/vomiting     Medications prior to admission:     Medications Prior to Admission   Medication Sig Dispense Refill Last Dose    amlodipine-olmesartan (Celia) 10-40 mg tablet TAKE 1 TABLET BY MOUTH ONCE  DAILY 90 tablet 1     cloNIDine (Catapres-TTS) 0.3 mg/24 hr patch PLACE 1 PATCH ON THE SKIN ONCE  WEEKLY 4 patch 1     levothyroxine (Synthroid, Levoxyl) 50 mcg tablet TAKE 1 TABLET BY MOUTH ONCE  DAILY 90 tablet 1     acetaminophen (Tylenol) 325 mg tablet Take 2 tablets (650 mg) by mouth every 6 hours if needed.       ascorbic acid, vitamin C, 500 mg capsule Take 1 capsule by mouth once daily.       aspirin (Beulah Low Dose Aspirin) 81 mg EC tablet Take 1 tablet (81 mg) by mouth once daily.       biotin 5,000 mcg tablet,chewable Chew 5,000 mcg once daily.       celecoxib (CeleBREX) 200 mg capsule TAKE 2 CAPSULES BY MOUTH ONCE  DAILY 90 capsule 7     cetirizine (ZyrTEC) 10 mg chewable tablet Chew 1 tablet (10 mg) once daily.       cholecalciferol (Vitamin D-3) 25 MCG (1000 UT) tablet Take 1 tablet (25 mcg) by mouth once daily.       cranberry extract-vitamin C (Azo Cranberry Plus Vit C) 250-60 mg capsule Take 1 capsule by mouth once daily.       cyanocobalamin (Vitamin B-12) 1,000 mcg tablet Take 1 tablet (1,000 mcg) by mouth once daily.       darifenacin (Enablex) 15 mg 24 hr tablet TAKE 1 TABLET BY MOUTH ONCE  DAILY 90 tablet 3     docusate sodium (Colace) 100 mg capsule Take 1 capsule (100 mg) by mouth 2 times a day.       estradiol (Estrace) 0.01 % (0.1 mg/gram) vaginal cream Insert 0.01 g into the vagina once daily at bedtime. 3 x a week       folic acid (Folvite) 400 mcg tablet Take 1 tablet (0.4 mg) by mouth once daily.       GARLIC ORAL Take 2,400 mg by mouth once daily.       latanoprost (Xalatan) 0.005 % ophthalmic solution Administer 1 drop into both eyes once daily at bedtime.       magnesium oxide (Mag-Ox) 400 mg (241.3 mg magnesium) tablet Take 1 tablet (400 mg) by mouth once daily.       omega-3  acid ethyl esters (Lovaza) 1 gram capsule Take 1 capsule (1 g) by mouth 2 times a day.       Revlimid 5 mg capsule Take 1 capsule (5 mg) by mouth once daily. As directed per oncology       terconazole (Terazol 7) 0.4 % vaginal cream Insert 1 applicator into the vagina once daily at bedtime. Every night for 7 days       timolol (Timoptic) 0.5 % ophthalmic solution Administer 1 drop into affected eye(s) every 12 hours.       trospium (Sanctura) 20 mg tablet Take 1 tablet (20 mg) by mouth every 12 hours.       valACYclovir (Valtrex) 500 mg tablet Take 1 tablet (500 mg) by mouth once daily. 1/2 tablet twice daily 3 times a week. M,W,F       vitamin A 2,400 mcg capsule Take 1 capsule (2.4 mg) by mouth once daily.       vitamin E 180 mg (400 unit) capsule Take 1 capsule (400 Units) by mouth once daily.              Present Medications:  Scheduled medications   Medication Dose Route Frequency      Scheduled medications   Medication Dose Route Frequency    amlodipine-olmesartan  1 tablet oral Daily    ascorbic acid  500 mg oral Daily    aspirin  81 mg oral Daily    biotin  1 capsule oral Daily    cholecalciferol  1,000 Units oral Daily    cloNIDine  1 patch transdermal Weekly    cyanocobalamin  1,000 mcg oral Daily    docusate sodium  100 mg oral BID    icosapent ethyL  2 g oral BID with meals    latanoprost  1 drop Both Eyes Nightly    lenalidomide  10 mg oral Daily    levothyroxine  50 mcg oral Daily    lidocaine  1 patch transdermal Daily    loratadine  10 mg oral Daily    magnesium oxide  400 mg oral Daily    sennosides-docusate sodium  2 tablet oral BID    timolol  1 drop ophthalmic (eye) q12h    triamterene-hydrochlorothiazid  1 tablet oral Daily    valACYclovir  500 mg oral 2 times per day on Mon Wed Fri    vitamin A  8,000 Units oral Daily    vitamin E  400 Units oral Daily      PRN medications   Medication    acetaminophen    alum-mag hydroxide-simeth    heparin    ketorolac    magnesium hydroxide    melatonin     "ondansetron    traMADol          I&O:  No intake or output data in the 24 hours ending 01/12/24 1631     Vital Signs:  Blood pressure 168/83, pulse 84, temperature 36.2 °C (97.2 °F), resp. rate 18, height 1.778 m (5' 10\"), weight 104 kg (230 lb), SpO2 95 %.    Physical Exam:  -General appearance: Pleasant elderly white female, somewhat loquacious, presently alert.  Does complain of pain in a very localized area in her right SI/posterior hip area.  -Vital signs:  As above  -HEENT: Pupils are reactive, extraocular movements intact.  Lids, conjunctiva, sclera are normal.  Mouth/pharynx unremarkable  -Neck: No JVD or adenopathy  -Chest: Lungs are clear to auscultation  -Cardiac: Regular rhythm, grade 2/6 systolic murmur  -Abdomen: Soft, bowel sounds active.  No blatant tenderness, no obvious masses/organomegaly but examined sitting up on the bedside commode  -Extremities: She has significant bilateral lower extremity edema.  It is only partially pitting knee brace in place on her left lower extremities.  -Skin: Chronic skin changes lower extremities, no rash  -Neurologic:   Patient is alert and oriented x3.  Cranial nerves II through XII are intact.  -Behavior/Emotional:  Appropriate, cooperative    Labs:  Results for orders placed or performed during the hospital encounter of 01/12/24 (from the past 24 hour(s))   Protime-INR   Result Value Ref Range    Protime 10.8 9.8 - 12.8 seconds    INR 1.0 0.9 - 1.1   Morphology   Result Value Ref Range    RBC Morphology See Below     Ovalocytes Few     Den[place] Cells Few    Urinalysis with Reflex Culture and Microscopic   Result Value Ref Range    Color, Urine Yellow Straw, Yellow    Appearance, Urine Clear Clear    Specific Gravity, Urine 1.009 1.005 - 1.035    pH, Urine 5.0 5.0, 5.5, 6.0, 6.5, 7.0, 7.5, 8.0    Protein, Urine NEGATIVE NEGATIVE mg/dL    Glucose, Urine NEGATIVE NEGATIVE mg/dL    Blood, Urine NEGATIVE NEGATIVE    Ketones, Urine 5 (TRACE) (A) NEGATIVE mg/dL    " Bilirubin, Urine NEGATIVE NEGATIVE    Urobilinogen, Urine <2.0 <2.0 mg/dL    Nitrite, Urine NEGATIVE NEGATIVE    Leukocyte Esterase, Urine NEGATIVE NEGATIVE     CBC:   Results from last 7 days   Lab Units 01/12/24  1159   WBC AUTO x10*3/uL 5.7   RBC AUTO x10*6/uL 3.46*   HEMOGLOBIN g/dL 12.1   HEMATOCRIT % 38.8   MCV fL 112*   MCH pg 35.0*   MCHC g/dL 31.2*   RDW % 12.9   PLATELETS AUTO x10*3/uL 171     CMP:    Results from last 7 days   Lab Units 01/12/24  1159   SODIUM mmol/L 143   POTASSIUM mmol/L 3.5   CHLORIDE mmol/L 110*   CO2 mmol/L 23   BUN mg/dL 17   CREATININE mg/dL 1.00   GLUCOSE mg/dL 85   PROTEIN TOTAL g/dL 6.2*   CALCIUM mg/dL 9.0   BILIRUBIN TOTAL mg/dL 0.7   ALK PHOS U/L 86   AST U/L 12   ALT U/L 11       Radiology:  CT lumbar spine wo IV contrast   ALIGNMENT:  Mild-to-moderate levoscoliosis      LOWER THORACIC SPINE:  The visualized lower thoracic spine is maintained.      T12-L1:  Maintained.      L1-2:  Marked narrowing of the disc interspace left laterally due to the  scoliosis with vacuum phenomena and moderate marginal osteophyte  formation and mild endplate irregularity.      L2-3:  Marked narrowing of the disc interspace right laterally due to the  scoliosis with endplate sclerosis and moderate marginal osteophyte  formation.      L3-4:  Marked narrowing of the disc interspace with mild vacuum phenomena  moderate marginal osteophyte formation anteriorly and particularly  right laterally. There is also mild osteophytosis at the right  foramen mild impingement.      L4-5:  Marked narrowing of the disc interspace with vacuum phenomena and  mild marginal osteophyte formation. This is also present at the right  neuroforamen with mild foraminal impingement. There is also mild  hypertrophy of the posterior elements with a mild canal stenosis.      L5-S1:  Marked narrowing of the disc interspace with vacuum phenomena and  mild endplate sclerosis. There is also mild anterior marginal  osteophyte  formation and anterior disc bulge. There is mild  hypertrophy of the apophyseal joints but without significant  foraminal impingement.    Final Result   Moderate multilevel spondylosis as described, otherwise without acute   findings.        Signed by: Timothy Cm 1/12/2024 1:12 PM   Dictation workstation:   [id number]      CT abdomen pelvis wo IV contrast   Final Result   1.  Large central mesenteric mass, probably due to lymphoma given the   patient's history.   2. Probable constipation. Additional findings as above.        MACRO:   1. None        Signed by: Timothy Cm 1/12/2024 1:31 PM   Dictation workstation:   [id number]      Vascular US lower extremity venous duplex bilateral   Final Result   Findings consistent with deep vein thrombosis throughout the left   femoral vein, as above.        MACRO:   None        Signed by: Everett Schwartz 1/12/2024 12:44 PM   Dictation workstation:   [id number]           Soraya Schneider MD      *Some of this note was completed using Dragon voice recognition technology and may include unintended errors with respect to translation of words, typographical errors or grammar errors which may not have been identified prior to finalization of the chart note.

## 2024-01-12 NOTE — NURSING NOTE
Pt arrived on floor with heparin gtt infusing at 19cc/hr. Bag was hung at 1520 per MAR.  Heparin essay scheduled for 1930

## 2024-01-13 PROBLEM — F41.9 ANXIETY AND DEPRESSION: Status: ACTIVE | Noted: 2024-01-13

## 2024-01-13 PROBLEM — F32.A ANXIETY AND DEPRESSION: Status: ACTIVE | Noted: 2024-01-13

## 2024-01-13 PROBLEM — Z79.899 ON PALLIATIVE ANTINEOPLASTIC CHEMOTHERAPY: Status: ACTIVE | Noted: 2024-01-13

## 2024-01-13 PROBLEM — M79.18 PAIN IN RIGHT BUTTOCK: Status: ACTIVE | Noted: 2024-01-13

## 2024-01-13 PROBLEM — Z79.69 ON PALLIATIVE ANTINEOPLASTIC CHEMOTHERAPY: Status: ACTIVE | Noted: 2024-01-13

## 2024-01-13 PROBLEM — R52 GENERALIZED PAIN: Status: ACTIVE | Noted: 2024-01-13

## 2024-01-13 PROBLEM — E87.6 HYPOKALEMIA: Status: ACTIVE | Noted: 2024-01-13

## 2024-01-13 PROBLEM — E83.42 HYPOMAGNESEMIA: Status: ACTIVE | Noted: 2024-01-13

## 2024-01-13 LAB
ANION GAP SERPL CALC-SCNC: 11 MMOL/L (ref 10–20)
BUN SERPL-MCNC: 14 MG/DL (ref 6–23)
CALCIUM SERPL-MCNC: 8.7 MG/DL (ref 8.6–10.3)
CHLORIDE SERPL-SCNC: 109 MMOL/L (ref 98–107)
CO2 SERPL-SCNC: 25 MMOL/L (ref 21–32)
CREAT SERPL-MCNC: 0.92 MG/DL (ref 0.5–1.05)
EGFRCR SERPLBLD CKD-EPI 2021: 60 ML/MIN/1.73M*2
ERYTHROCYTE [DISTWIDTH] IN BLOOD BY AUTOMATED COUNT: 12.8 % (ref 11.5–14.5)
ERYTHROCYTE [SEDIMENTATION RATE] IN BLOOD BY WESTERGREN METHOD: 10 MM/H (ref 0–30)
GLUCOSE SERPL-MCNC: 109 MG/DL (ref 74–99)
HCT VFR BLD AUTO: 31.9 % (ref 36–46)
HGB BLD-MCNC: 10.3 G/DL (ref 12–16)
HOLD SPECIMEN: NORMAL
MAGNESIUM SERPL-MCNC: 1.51 MG/DL (ref 1.6–2.4)
MCH RBC QN AUTO: 36.3 PG (ref 26–34)
MCHC RBC AUTO-ENTMCNC: 32.3 G/DL (ref 32–36)
MCV RBC AUTO: 112 FL (ref 80–100)
NRBC BLD-RTO: 0 /100 WBCS (ref 0–0)
PLATELET # BLD AUTO: 175 X10*3/UL (ref 150–450)
POTASSIUM SERPL-SCNC: 3.3 MMOL/L (ref 3.5–5.3)
RBC # BLD AUTO: 2.84 X10*6/UL (ref 4–5.2)
SODIUM SERPL-SCNC: 142 MMOL/L (ref 136–145)
UFH PPP CHRO-ACNC: 0.6 IU/ML
UFH PPP CHRO-ACNC: 0.6 IU/ML
UFH PPP CHRO-ACNC: 0.8 IU/ML
UFH PPP CHRO-ACNC: 0.9 IU/ML
UFH PPP CHRO-ACNC: 0.9 IU/ML
WBC # BLD AUTO: 5.2 X10*3/UL (ref 4.4–11.3)

## 2024-01-13 PROCEDURE — 2500000004 HC RX 250 GENERAL PHARMACY W/ HCPCS (ALT 636 FOR OP/ED): Performed by: INTERNAL MEDICINE

## 2024-01-13 PROCEDURE — 83735 ASSAY OF MAGNESIUM: CPT | Performed by: INTERNAL MEDICINE

## 2024-01-13 PROCEDURE — 2500000001 HC RX 250 WO HCPCS SELF ADMINISTERED DRUGS (ALT 637 FOR MEDICARE OP): Performed by: INTERNAL MEDICINE

## 2024-01-13 PROCEDURE — 94760 N-INVAS EAR/PLS OXIMETRY 1: CPT

## 2024-01-13 PROCEDURE — 99233 SBSQ HOSP IP/OBS HIGH 50: CPT | Performed by: INTERNAL MEDICINE

## 2024-01-13 PROCEDURE — 2500000005 HC RX 250 GENERAL PHARMACY W/O HCPCS: Performed by: INTERNAL MEDICINE

## 2024-01-13 PROCEDURE — 1210000001 HC SEMI-PRIVATE ROOM DAILY

## 2024-01-13 PROCEDURE — 85520 HEPARIN ASSAY: CPT | Performed by: INTERNAL MEDICINE

## 2024-01-13 PROCEDURE — 36415 COLL VENOUS BLD VENIPUNCTURE: CPT | Performed by: INTERNAL MEDICINE

## 2024-01-13 PROCEDURE — 80048 BASIC METABOLIC PNL TOTAL CA: CPT | Performed by: INTERNAL MEDICINE

## 2024-01-13 PROCEDURE — 85027 COMPLETE CBC AUTOMATED: CPT | Performed by: INTERNAL MEDICINE

## 2024-01-13 PROCEDURE — 85652 RBC SED RATE AUTOMATED: CPT | Performed by: INTERNAL MEDICINE

## 2024-01-13 RX ORDER — POTASSIUM CHLORIDE 20 MEQ/1
20 TABLET, EXTENDED RELEASE ORAL ONCE
Status: COMPLETED | OUTPATIENT
Start: 2024-01-13 | End: 2024-01-13

## 2024-01-13 RX ORDER — KETOROLAC TROMETHAMINE 30 MG/ML
15 INJECTION, SOLUTION INTRAMUSCULAR; INTRAVENOUS EVERY 6 HOURS
Status: DISCONTINUED | OUTPATIENT
Start: 2024-01-13 | End: 2024-01-14 | Stop reason: HOSPADM

## 2024-01-13 RX ORDER — MAGNESIUM SULFATE HEPTAHYDRATE 40 MG/ML
4 INJECTION, SOLUTION INTRAVENOUS ONCE
Status: COMPLETED | OUTPATIENT
Start: 2024-01-13 | End: 2024-01-13

## 2024-01-13 RX ADMIN — KETOROLAC TROMETHAMINE 15 MG: 30 INJECTION, SOLUTION INTRAMUSCULAR at 09:26

## 2024-01-13 RX ADMIN — TRIAMTERENE AND HYDROCHLOROTHIAZIDE 1 TABLET: 37.5; 25 TABLET ORAL at 09:21

## 2024-01-13 RX ADMIN — LATANOPROST 1 DROP: 50 SOLUTION OPHTHALMIC at 21:20

## 2024-01-13 RX ADMIN — KETOROLAC TROMETHAMINE 15 MG: 30 INJECTION, SOLUTION INTRAMUSCULAR at 21:25

## 2024-01-13 RX ADMIN — Medication 500 MG: at 09:20

## 2024-01-13 RX ADMIN — DOCUSATE SODIUM 100 MG: 100 CAPSULE, LIQUID FILLED ORAL at 21:17

## 2024-01-13 RX ADMIN — TIMOLOL MALEATE 1 DROP: 5 SOLUTION/ DROPS OPHTHALMIC at 06:59

## 2024-01-13 RX ADMIN — LIDOCAINE 1 PATCH: 4 PATCH TOPICAL at 14:42

## 2024-01-13 RX ADMIN — CYANOCOBALAMIN TAB 500 MCG 1000 MCG: 500 TAB at 09:20

## 2024-01-13 RX ADMIN — MAGNESIUM SULFATE HEPTAHYDRATE 4 G: 40 INJECTION, SOLUTION INTRAVENOUS at 15:44

## 2024-01-13 RX ADMIN — POTASSIUM CHLORIDE 20 MEQ: 1500 TABLET, EXTENDED RELEASE ORAL at 14:40

## 2024-01-13 RX ADMIN — ICOSAPENT ETHYL 2 G: 1 CAPSULE ORAL at 09:20

## 2024-01-13 RX ADMIN — KETOROLAC TROMETHAMINE 15 MG: 30 INJECTION, SOLUTION INTRAMUSCULAR at 15:44

## 2024-01-13 RX ADMIN — MAGNESIUM OXIDE 400 MG (241.3 MG MAGNESIUM) TABLET 400 MG: TABLET at 09:24

## 2024-01-13 RX ADMIN — LORATADINE 10 MG: 10 TABLET ORAL at 09:24

## 2024-01-13 RX ADMIN — Medication 1000 UNITS: at 09:23

## 2024-01-13 RX ADMIN — CLONIDINE 1 PATCH: 0.3 PATCH TRANSDERMAL at 09:25

## 2024-01-13 RX ADMIN — LEVOTHYROXINE SODIUM 50 MCG: 50 TABLET ORAL at 09:24

## 2024-01-13 RX ADMIN — KETOROLAC TROMETHAMINE 15 MG: 30 INJECTION, SOLUTION INTRAMUSCULAR at 01:21

## 2024-01-13 RX ADMIN — ASPIRIN 81 MG: 81 TABLET, COATED ORAL at 09:23

## 2024-01-13 RX ADMIN — DOCUSATE SODIUM 50MG AND SENNOSIDES 8.6MG 2 TABLET: 8.6; 5 TABLET, FILM COATED ORAL at 21:16

## 2024-01-13 RX ADMIN — HEPARIN SODIUM 1500 UNITS/HR: 10000 INJECTION, SOLUTION INTRAVENOUS at 07:51

## 2024-01-13 RX ADMIN — ICOSAPENT ETHYL 2 G: 1 CAPSULE ORAL at 17:49

## 2024-01-13 RX ADMIN — AMLODIPINE AND OLMESARTAN MEDOXOMIL 1 TABLET: 10; 40 TABLET ORAL at 14:41

## 2024-01-13 RX ADMIN — TRAMADOL HYDROCHLORIDE 50 MG: 50 TABLET, COATED ORAL at 05:22

## 2024-01-13 ASSESSMENT — PAIN - FUNCTIONAL ASSESSMENT
PAIN_FUNCTIONAL_ASSESSMENT: 0-10

## 2024-01-13 ASSESSMENT — PAIN DESCRIPTION - LOCATION
LOCATION: BUTTOCKS
LOCATION: BACK
LOCATION: BUTTOCKS

## 2024-01-13 ASSESSMENT — COGNITIVE AND FUNCTIONAL STATUS - GENERAL
WALKING IN HOSPITAL ROOM: A LOT
TOILETING: A LITTLE
STANDING UP FROM CHAIR USING ARMS: A LITTLE
DAILY ACTIVITIY SCORE: 19
HELP NEEDED FOR BATHING: A LITTLE
MOBILITY SCORE: 16
MOVING FROM LYING ON BACK TO SITTING ON SIDE OF FLAT BED WITH BEDRAILS: A LITTLE
PERSONAL GROOMING: A LITTLE
TURNING FROM BACK TO SIDE WHILE IN FLAT BAD: A LITTLE
CLIMB 3 TO 5 STEPS WITH RAILING: A LOT
MOVING TO AND FROM BED TO CHAIR: A LITTLE
DRESSING REGULAR UPPER BODY CLOTHING: A LITTLE
DRESSING REGULAR LOWER BODY CLOTHING: A LITTLE

## 2024-01-13 ASSESSMENT — PAIN SCALES - GENERAL
PAINLEVEL_OUTOF10: 5 - MODERATE PAIN
PAINLEVEL_OUTOF10: 6
PAINLEVEL_OUTOF10: 7
PAINLEVEL_OUTOF10: 10 - WORST POSSIBLE PAIN
PAINLEVEL_OUTOF10: 10 - WORST POSSIBLE PAIN
PAINLEVEL_OUTOF10: 7
PAINLEVEL_OUTOF10: 7
PAINLEVEL_OUTOF10: 8
PAINLEVEL_OUTOF10: 5 - MODERATE PAIN

## 2024-01-13 ASSESSMENT — PAIN DESCRIPTION - ORIENTATION
ORIENTATION: RIGHT
ORIENTATION: RIGHT
ORIENTATION: LOWER

## 2024-01-13 ASSESSMENT — PAIN SCALES - PAIN ASSESSMENT IN ADVANCED DEMENTIA (PAINAD)
BODYLANGUAGE: RELAXED
BREATHING: NORMAL
TOTALSCORE: 0
CONSOLABILITY: NO NEED TO CONSOLE
FACIALEXPRESSION: SMILING OR INEXPRESSIVE

## 2024-01-13 ASSESSMENT — PAIN SCALES - WONG BAKER: WONGBAKER_NUMERICALRESPONSE: HURTS LITTLE BIT

## 2024-01-13 NOTE — PROGRESS NOTES
Laura Woodward is a 88 y.o. female on day 1 of admission presenting with Acute deep vein thrombosis (DVT) of femoral vein of left lower extremity (CMS/HCC).      ASSESSMENT/PLAN   Principal Problem:  -Acute deep vein thrombosis (DVT) of femoral vein of left lower extremity (CMS/HCC)-Extensive DVT left lower extremity-on heparin drip.  She has significant edema bilaterally, main risk factors are her sedentary lifestyle and active lymphoma.  Edema down slightly in her left leg.  -Generalized pain-presented with a very localized area of pain in the right SI area, now complaining of pain which seems to be moving around to many places.  Will try scheduled ketorolac.  Has tramadol if needed for breakthrough pain.  -Hypokalemia-will replace orally  -Hypomagnesemia-will replace IV-she is already on magnesium oxide orally.  -Reported CKD stage III-will continue to monitor RFT's closely in setting of Toradol.    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  Continuing her Revlimid.  -Hypertension-blood pressures were up in the ER but she was in pain, will treat with her home BP medications, as well as pain medications.  -Obesity with a BMI of 33.0  -Hypothyroidism on replacement  -Bilateral knee pain, L >R, s/p multiple replacements.  She does wear a knee brace.    SUBJECTIVE/OBJECTIVE   01/13/24   -Complaining of her pain moving around-went from her right buttock area, into her low left lateral chest, then into her right chest, then into her chest walls, nonpleuritic.  She is repeatedly asking why she is having the pain.  Also pain in her left neck.  -I discussed with the patient and her daughter at length possible causes, but with the changing pattern of her pain I am uncertain as to the etiology.  Toradol worked best for her pain during the night, will place on scheduled Toradol with as needed tramadol if needed.  ESR ordered, although might not be helpful in the setting of her lymphoma.  -We also discussed the possible  causes for her clot, including sedentary, cancer history.  I will try to discuss with radiology to see if they think this may be a chronic clot rather than acute.  -Daughter was asking if I thought it was due to her lymphoma, she does have the large mass in her abdomen.  However the symptoms do not seem consistent with bone involvement.  -She is afebrile, blood pressure mildly elevated this morning, was good throughout the night.  Will watch closely, prefer not to change her BP medications unless needed.  -Chest is clear to auscultation.  She has some chest wall tenderness to palpation over the lower costochondral junctions bilaterally.  -Cardiac exam is a regular rhythm  -Some mild decrease in her left lower extremity edema.  -Chemistry panel with a low potassium at 3.3-will replace orally, added magnesium to labs from this morning and it was also low at 1.51 spite of her oral replacement-will give IV replacement.  Other electrolytes normal.  Blood sugar was 109 (nonfasting).  -BUN 14 with a creatinine of 0.92.  -CBC with a WBC of 5.2, H/H has dropped to 10.3/31.9, platelet count 175 K.  Will continue to monitor.  -Heparin assay therapeutic.  -Reaching out to radiology to see if they can determine acute versus possible chronic clot.    *These notes are being done using Dragon voice recognition technology and may include unintended errors with respect to translation of words, typographical errors or grammar errors which may not have been identified prior to finalization of the chart note.    CURRENT MEDICATIONS     Scheduled Medications:    Current Facility-Administered Medications:     acetaminophen (Tylenol) tablet 650 mg, 650 mg, oral, q4h PRN, Soraya Schneider MD    alum-mag hydroxide-simeth (Mylanta) 200-200-20 mg/5 mL oral suspension 30 mL, 30 mL, oral, 4x daily PRN, Soraya Schneider MD    amlodipine-olmesartan (Celia) 10-40 mg per tablet 1 tablet, 1 tablet, oral, Daily, Soraya Schneider MD    ascorbic acid (Vitamin C) tablet  500 mg, 500 mg, oral, Daily, Soraya Schneider MD, 500 mg at 01/13/24 0920    aspirin EC tablet 81 mg, 81 mg, oral, Daily, Soraya Schneider MD, 81 mg at 01/13/24 0923    biotin capsule 2.5 mg, 1 capsule, oral, Daily, Soraya Schneider MD    cholecalciferol (Vitamin D-3) tablet 1,000 Units, 1,000 Units, oral, Daily, Soraya Schneider MD, 1,000 Units at 01/13/24 0923    cloNIDine (Catapres-TTS) 0.3 mg/24 hr patch 1 patch, 1 patch, transdermal, Weekly, Soraya Schneider MD, 1 patch at 01/13/24 0925    cyanocobalamin (Vitamin B-12) tablet 1,000 mcg, 1,000 mcg, oral, Daily, Soraya Schneider MD, 1,000 mcg at 01/13/24 0920    docusate sodium (Colace) capsule 100 mg, 100 mg, oral, BID, Soraya Schneider MD    heparin (porcine) injection 3,000-6,000 Units, 3,000-6,000 Units, intravenous, q4h PRN, Soraya Schneider MD    heparin 25,000 Units in dextrose 5% 250 mL (100 Units/mL) infusion (premix), 0-4,500 Units/hr, intravenous, Continuous, Soraya Schneider MD, Last Rate: 15 mL/hr at 01/13/24 0751, 1,500 Units/hr at 01/13/24 0751    icosapent ethyL (Vascepa) capsule 2 g, 2 g, oral, BID with meals, Soraya Schneider MD, 2 g at 01/13/24 0920    ketorolac (Toradol) injection 15 mg, 15 mg, intravenous, q6h PRN, Soraya Schneider MD, 15 mg at 01/13/24 0926    latanoprost (Xalatan) 0.005 % ophthalmic solution 1 drop, 1 drop, Both Eyes, Nightly, Soraya Schneider MD, 1 drop at 01/12/24 2036    lenalidomide (Revlimid) capsule 10 mg, 10 mg, oral, Daily, Soraya Schneider MD    levothyroxine (Synthroid, Levoxyl) tablet 50 mcg, 50 mcg, oral, Daily, Soraya Schneider MD, 50 mcg at 01/13/24 0924    lidocaine 4 % patch 1 patch, 1 patch, transdermal, Daily, Soraya Schneider MD, 1 patch at 01/12/24 1836    loratadine (Claritin) tablet 10 mg, 10 mg, oral, Daily, Soraya Schneider MD, 10 mg at 01/13/24 0924    magnesium hydroxide (Milk of Magnesia) 2,400 mg/10 mL suspension 10 mL, 10 mL, oral, Daily PRN, Soraya Schneider MD    magnesium oxide (Mag-Ox) tablet 400 mg, 400 mg, oral, Daily, Soraya Schneider MD, 400 mg at 01/13/24 0924    melatonin  "tablet 5 mg, 5 mg, oral, Nightly PRN, Soraya Schneider MD    ondansetron (Zofran) injection 4 mg, 4 mg, intravenous, q4h PRN, Soraya Schneider MD    sennosides-docusate sodium (Amira-Colace) 8.6-50 mg per tablet 2 tablet, 2 tablet, oral, BID, Soraya Schneider MD    timolol (Timoptic) 0.5 % ophthalmic solution 1 drop, 1 drop, ophthalmic (eye), q12h, Soraya Schneider MD, 1 drop at 01/13/24 0659    traMADol (Ultram) tablet 50 mg, 50 mg, oral, q8h PRN, Soraya Schneider MD, 50 mg at 01/13/24 0522    triamterene-hydrochlorothiazid (Maxzide-25) 37.5-25 mg per tablet 1 tablet, 1 tablet, oral, Daily, Soraya Schneider MD, 1 tablet at 01/13/24 0921    valACYclovir (Valtrex) tablet 500 mg, 500 mg, oral, 2 times per day on Mon Wed Fri, Soraya Schneider MD    vitamin A capsule 2.4 mg, 8,000 Units, oral, Daily, Soraya Schneider MD    vitamin E capsule 400 Units, 400 Units, oral, Daily, Soraya Schneider MD     PRN Medications:  PRN medications   Medication    acetaminophen    alum-mag hydroxide-simeth    heparin    ketorolac    magnesium hydroxide    melatonin    ondansetron    traMADol         IVs:  heparin, 0-4,500 Units/hr, Last Rate: 1,500 Units/hr (01/13/24 0751)         I&Os     Intake/Output last 3 Shifts:  I/O last 3 completed shifts:  In: - (0 mL/kg)   Out: 900 (8.6 mL/kg) [Urine:900 (0.2 mL/kg/hr)]  Weight: 104.3 kg     VITAL SIGNS     Blood pressure (!) 181/92, pulse 75, temperature 36.7 °C (98.1 °F), temperature source Temporal, resp. rate 20, height 1.778 m (5' 10\"), weight 104 kg (230 lb), SpO2 90 %.     PHYSICAL EXAM   Physical exam:  -General appearance: Loquacious elderly white female, sitting up in bed, complaining of her pain which is moving around.  The IV Toradol seemed to help the most.  Wants to know why she is having these pains.  -Vital signs:  As above  -HEENT: No icterus  -Neck: No JVD  -Chest: Lungs are clear to auscultation  -Cardiac: Regular rhythm  -Abdomen: Bowel sounds present  -Extremities: She has bilateral lower extremity swelling, L >R.  " Her left lower extremity edema has improved slightly, she does have thickening of the skin over both lower extremities.  -Skin: No rash  -Neurologic:   Patient is alert and oriented x3.   -Behavior/Emotional:  Appropriate, cooperative    LABS   Relevant Results:  Results from last 7 days   Lab Units 01/12/24  1159   GLUCOSE mg/dL 85        CBC:   Results from last 7 days   Lab Units 01/12/24  1159   WBC AUTO x10*3/uL 5.7   RBC AUTO x10*6/uL 3.46*   HEMOGLOBIN g/dL 12.1   HEMATOCRIT % 38.8   MCV fL 112*   MCH pg 35.0*   MCHC g/dL 31.2*   RDW % 12.9   PLATELETS AUTO x10*3/uL 171     CMP:    Results from last 7 days   Lab Units 01/12/24  1159   SODIUM mmol/L 143   POTASSIUM mmol/L 3.5   CHLORIDE mmol/L 110*   CO2 mmol/L 23   BUN mg/dL 17   CREATININE mg/dL 1.00   GLUCOSE mg/dL 85   PROTEIN TOTAL g/dL 6.2*   CALCIUM mg/dL 9.0   BILIRUBIN TOTAL mg/dL 0.7   ALK PHOS U/L 86   AST U/L 12   ALT U/L 11     Magnesium:    Magnesium 1.51      IMAGING     CT lumbar spine wo IV contrast   Final Result   Moderate multilevel spondylosis as described, otherwise without acute   findings.        Signed by: Timothy Cm 1/12/2024 1:12 PM   Dictation workstation:   [id number]      CT abdomen pelvis wo IV contrast   Final Result   1.  Large central mesenteric mass, probably due to lymphoma given the   patient's history.   2. Probable constipation. Additional findings as above.        MACRO:   1. None        Signed by: Timothy Cm 1/12/2024 1:31 PM   Dictation workstation:   [id number]      Vascular US lower extremity venous duplex bilateral   Final Result   Findings consistent with deep vein thrombosis throughout the left   femoral vein, as above.        MACRO:   None        Signed by: Everett Schwartz 1/12/2024 12:44 PM   Dictation workstation:   [id number]         I spent 60 minutes in the professional and overall care of this patient.    Soraya Schneider MD

## 2024-01-14 VITALS
TEMPERATURE: 98.6 F | HEIGHT: 70 IN | BODY MASS INDEX: 32.93 KG/M2 | SYSTOLIC BLOOD PRESSURE: 139 MMHG | RESPIRATION RATE: 18 BRPM | DIASTOLIC BLOOD PRESSURE: 66 MMHG | HEART RATE: 75 BPM | OXYGEN SATURATION: 94 % | WEIGHT: 230 LBS

## 2024-01-14 LAB
ANION GAP SERPL CALC-SCNC: 12 MMOL/L (ref 10–20)
BUN SERPL-MCNC: 11 MG/DL (ref 6–23)
CALCIUM SERPL-MCNC: 8.6 MG/DL (ref 8.6–10.3)
CHLORIDE SERPL-SCNC: 108 MMOL/L (ref 98–107)
CO2 SERPL-SCNC: 27 MMOL/L (ref 21–32)
CREAT SERPL-MCNC: 0.92 MG/DL (ref 0.5–1.05)
EGFRCR SERPLBLD CKD-EPI 2021: 60 ML/MIN/1.73M*2
ERYTHROCYTE [DISTWIDTH] IN BLOOD BY AUTOMATED COUNT: 12.9 % (ref 11.5–14.5)
GLUCOSE SERPL-MCNC: 105 MG/DL (ref 74–99)
HCT VFR BLD AUTO: 34.1 % (ref 36–46)
HGB BLD-MCNC: 11 G/DL (ref 12–16)
HOLD SPECIMEN: NORMAL
MAGNESIUM SERPL-MCNC: 2 MG/DL (ref 1.6–2.4)
MCH RBC QN AUTO: 35.6 PG (ref 26–34)
MCHC RBC AUTO-ENTMCNC: 32.3 G/DL (ref 32–36)
MCV RBC AUTO: 110 FL (ref 80–100)
NRBC BLD-RTO: 0 /100 WBCS (ref 0–0)
PLATELET # BLD AUTO: 175 X10*3/UL (ref 150–450)
POTASSIUM SERPL-SCNC: 3.4 MMOL/L (ref 3.5–5.3)
RBC # BLD AUTO: 3.09 X10*6/UL (ref 4–5.2)
SODIUM SERPL-SCNC: 144 MMOL/L (ref 136–145)
UFH PPP CHRO-ACNC: 0.5 IU/ML
UFH PPP CHRO-ACNC: 0.5 IU/ML
WBC # BLD AUTO: 4.8 X10*3/UL (ref 4.4–11.3)

## 2024-01-14 PROCEDURE — 85520 HEPARIN ASSAY: CPT | Performed by: INTERNAL MEDICINE

## 2024-01-14 PROCEDURE — 2500000001 HC RX 250 WO HCPCS SELF ADMINISTERED DRUGS (ALT 637 FOR MEDICARE OP): Performed by: INTERNAL MEDICINE

## 2024-01-14 PROCEDURE — 85027 COMPLETE CBC AUTOMATED: CPT | Performed by: INTERNAL MEDICINE

## 2024-01-14 PROCEDURE — 80048 BASIC METABOLIC PNL TOTAL CA: CPT | Performed by: INTERNAL MEDICINE

## 2024-01-14 PROCEDURE — 2500000004 HC RX 250 GENERAL PHARMACY W/ HCPCS (ALT 636 FOR OP/ED): Performed by: INTERNAL MEDICINE

## 2024-01-14 PROCEDURE — 2500000005 HC RX 250 GENERAL PHARMACY W/O HCPCS: Performed by: INTERNAL MEDICINE

## 2024-01-14 PROCEDURE — 99239 HOSP IP/OBS DSCHRG MGMT >30: CPT | Performed by: INTERNAL MEDICINE

## 2024-01-14 PROCEDURE — 83735 ASSAY OF MAGNESIUM: CPT | Performed by: INTERNAL MEDICINE

## 2024-01-14 RX ORDER — CELECOXIB 100 MG/1
200 CAPSULE ORAL 2 TIMES DAILY
Status: DISCONTINUED | OUTPATIENT
Start: 2024-01-14 | End: 2024-01-14 | Stop reason: HOSPADM

## 2024-01-14 RX ORDER — TRAMADOL HYDROCHLORIDE 50 MG/1
50 TABLET ORAL EVERY 8 HOURS PRN
Qty: 10 TABLET | Refills: 0 | Status: SHIPPED | OUTPATIENT
Start: 2024-01-14

## 2024-01-14 RX ORDER — HEPARIN 100 UNIT/ML
5 SYRINGE INTRAVENOUS ONCE
Status: COMPLETED | OUTPATIENT
Start: 2024-01-14 | End: 2024-01-14

## 2024-01-14 RX ADMIN — ICOSAPENT ETHYL 2 G: 1 CAPSULE ORAL at 16:29

## 2024-01-14 RX ADMIN — ASPIRIN 81 MG: 81 TABLET, COATED ORAL at 08:59

## 2024-01-14 RX ADMIN — TRIAMTERENE AND HYDROCHLOROTHIAZIDE 1 TABLET: 37.5; 25 TABLET ORAL at 08:59

## 2024-01-14 RX ADMIN — MAGNESIUM OXIDE 400 MG (241.3 MG MAGNESIUM) TABLET 400 MG: TABLET at 08:59

## 2024-01-14 RX ADMIN — LORATADINE 10 MG: 10 TABLET ORAL at 08:58

## 2024-01-14 RX ADMIN — AMLODIPINE AND OLMESARTAN MEDOXOMIL 1 TABLET: 10; 40 TABLET ORAL at 06:04

## 2024-01-14 RX ADMIN — ICOSAPENT ETHYL 2 G: 1 CAPSULE ORAL at 08:57

## 2024-01-14 RX ADMIN — LIDOCAINE 1 PATCH: 4 PATCH TOPICAL at 09:00

## 2024-01-14 RX ADMIN — APIXABAN 10 MG: 5 TABLET, FILM COATED ORAL at 13:04

## 2024-01-14 RX ADMIN — CYANOCOBALAMIN TAB 500 MCG 1000 MCG: 500 TAB at 09:00

## 2024-01-14 RX ADMIN — HEPARIN 500 UNITS: 100 SYRINGE at 17:55

## 2024-01-14 RX ADMIN — KETOROLAC TROMETHAMINE 15 MG: 30 INJECTION, SOLUTION INTRAMUSCULAR at 09:00

## 2024-01-14 RX ADMIN — HEPARIN SODIUM 11 UNITS/HR: 10000 INJECTION, SOLUTION INTRAVENOUS at 05:47

## 2024-01-14 RX ADMIN — KETOROLAC TROMETHAMINE 15 MG: 30 INJECTION, SOLUTION INTRAMUSCULAR at 03:19

## 2024-01-14 RX ADMIN — Medication 500 MG: at 08:57

## 2024-01-14 RX ADMIN — Medication 1000 UNITS: at 08:59

## 2024-01-14 RX ADMIN — KETOROLAC TROMETHAMINE 15 MG: 30 INJECTION, SOLUTION INTRAMUSCULAR at 16:49

## 2024-01-14 RX ADMIN — LEVOTHYROXINE SODIUM 50 MCG: 50 TABLET ORAL at 08:59

## 2024-01-14 ASSESSMENT — COGNITIVE AND FUNCTIONAL STATUS - GENERAL
DRESSING REGULAR LOWER BODY CLOTHING: A LITTLE
MOVING TO AND FROM BED TO CHAIR: A LITTLE
WALKING IN HOSPITAL ROOM: A LOT
WALKING IN HOSPITAL ROOM: A LITTLE
MOVING FROM LYING ON BACK TO SITTING ON SIDE OF FLAT BED WITH BEDRAILS: A LITTLE
DRESSING REGULAR UPPER BODY CLOTHING: A LITTLE
DAILY ACTIVITIY SCORE: 19
TURNING FROM BACK TO SIDE WHILE IN FLAT BAD: A LITTLE
MOBILITY SCORE: 16
TOILETING: A LITTLE
CLIMB 3 TO 5 STEPS WITH RAILING: A LOT
DRESSING REGULAR UPPER BODY CLOTHING: A LITTLE
DRESSING REGULAR LOWER BODY CLOTHING: A LITTLE
HELP NEEDED FOR BATHING: A LITTLE
STANDING UP FROM CHAIR USING ARMS: A LITTLE
PERSONAL GROOMING: A LITTLE
MOBILITY SCORE: 16
TOILETING: A LITTLE
MOVING FROM LYING ON BACK TO SITTING ON SIDE OF FLAT BED WITH BEDRAILS: A LITTLE
TURNING FROM BACK TO SIDE WHILE IN FLAT BAD: A LITTLE
DAILY ACTIVITIY SCORE: 19
CLIMB 3 TO 5 STEPS WITH RAILING: TOTAL
PERSONAL GROOMING: A LITTLE
MOVING TO AND FROM BED TO CHAIR: A LITTLE
STANDING UP FROM CHAIR USING ARMS: A LITTLE
HELP NEEDED FOR BATHING: A LITTLE

## 2024-01-14 NOTE — PROGRESS NOTES
Laura Woodward is a 88 y.o. female on day 2 of admission presenting with Acute deep vein thrombosis (DVT) of femoral vein of left lower extremity (CMS/HCC).      ASSESSMENT/PLAN   Principal Problem:  -Acute deep vein thrombosis (DVT) of femoral vein of left lower extremity (CMS/HCC)-Extensive DVT left lower extremity-on heparin drip.  She has significant edema bilaterally, main risk factors are her sedentary lifestyle and active lymphoma.  Edema down slightly in her left leg.  -Generalized pain-presented with a very localized area of pain in the right SI area, now complaining of pain which seems to be moving around to many places.  Symptoms have improved significantly with scheduled ketorolac.  She would like to be discharged home.  -Hypokalemia-received more oral replacement due to her potassium of 3.4  -Hypomagnesemia-magnesium up to 2.0 after replacement  -Reported CKD stage III-creatinine stable at 0.92.    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  Continuing her Revlimid.  -Hypertension-blood pressures were up in the ER but she was in pain, will treat with her home BP medications, as well as pain medications.  -Obesity with a BMI of 33.0  -Hypothyroidism on replacement  -Bilateral knee pain, L >R, s/p multiple replacements.  She does wear a knee brace.    SUBJECTIVE/OBJECTIVE   01/14/24   -Pain is doing much better today, no longer hurting all over.  No nausea or vomiting, no shortness of breath.  -Daughter is in the room with her this afternoon-we discussed Eliquis, blood thinners, and her blood clots.  -[facility] in [place] does have the starter packs for her Eliquis, they would like to have the prescription sent there rather than to wait until tomorrow when it can be filled at [facility].  Prescription was called in.  -She is afebrile, blood pressure is up again this morning.  Has not had her BP medications yet.  -Chest is clear to auscultation  -Cardiac exam is a regular rhythm  -PT evaluations have not  been done yet, but patient has been able to get up and pivot to the bedside commode with minimal help.  They feel she is at her baseline and would like her discharged.  -Chemistry panel shows she is still hyponatremic at 3.4-will give more potassium orally.  -Magnesium now up to 2.0.  -Rest of her electrolytes unremarkable, BUN 11 and creatinine 0.92.  -CBC with a WBC of 4.8, H/H stable at 11.0/34.1.  Platelet count 175 K.  -Heparin assay therapeutic.  -Will discharge today.    1/13/2024  -Complaining of her pain moving around-went from her right buttock area, into her low left lateral chest, then into her right chest, then into her chest walls, nonpleuritic.  She is repeatedly asking why she is having the pain.  Also pain in her left neck.  -I discussed with the patient and her daughter at length possible causes, but with the changing pattern of her pain I am uncertain as to the etiology.  Toradol worked best for her pain during the night, will place on scheduled Toradol with as needed tramadol if needed.  ESR ordered, although might not be helpful in the setting of her lymphoma.  -We also discussed the possible causes for her clot, including sedentary, cancer history.  I will try to discuss with radiology to see if they think this may be a chronic clot rather than acute.  -Daughter was asking if I thought it was due to her lymphoma, she does have the large mass in her abdomen.  However the symptoms do not seem consistent with bone involvement.  -She is afebrile, blood pressure mildly elevated this morning, was good throughout the night.  Will watch closely, prefer not to change her BP medications unless needed.  -Chest is clear to auscultation.  She has some chest wall tenderness to palpation over the lower costochondral junctions bilaterally.  -Cardiac exam is a regular rhythm  -Some mild decrease in her left lower extremity edema.  -Chemistry panel with a low potassium at 3.3-will replace orally, added magnesium  to labs from this morning and it was also low at 1.51 spite of her oral replacement-will give IV replacement.  Other electrolytes normal.  Blood sugar was 109 (nonfasting).  -BUN 14 with a creatinine of 0.92.  -CBC with a WBC of 5.2, H/H has dropped to 10.3/31.9, platelet count 175 K.  Will continue to monitor.  -Heparin assay therapeutic.  -Reaching out to radiology to see if they can determine acute versus possible chronic clot.    *These notes are being done using Dragon voice recognition technology and may include unintended errors with respect to translation of words, typographical errors or grammar errors which may not have been identified prior to finalization of the chart note.    CURRENT MEDICATIONS     Scheduled Medications:    Current Facility-Administered Medications:     acetaminophen (Tylenol) tablet 650 mg, 650 mg, oral, q4h PRN, Soraya Schneider MD    alum-mag hydroxide-simeth (Mylanta) 200-200-20 mg/5 mL oral suspension 30 mL, 30 mL, oral, 4x daily PRN, Soraya Schneider MD    amlodipine-olmesartan (Celia) 10-40 mg per tablet 1 tablet, 1 tablet, oral, Daily, Soraya Schneider MD, 1 tablet at 01/14/24 0604    ascorbic acid (Vitamin C) tablet 500 mg, 500 mg, oral, Daily, Soraya Schneider MD, 500 mg at 01/14/24 0857    aspirin EC tablet 81 mg, 81 mg, oral, Daily, Soraya Schneider MD, 81 mg at 01/14/24 0859    biotin capsule 2.5 mg, 1 capsule, oral, Daily, Soraya Schneider MD    cholecalciferol (Vitamin D-3) tablet 1,000 Units, 1,000 Units, oral, Daily, Soraya Schneider MD, 1,000 Units at 01/14/24 0859    cloNIDine (Catapres-TTS) 0.3 mg/24 hr patch 1 patch, 1 patch, transdermal, Weekly, Soraya Schneider MD, 1 patch at 01/13/24 0925    cyanocobalamin (Vitamin B-12) tablet 1,000 mcg, 1,000 mcg, oral, Daily, Soraya Schneider MD, 1,000 mcg at 01/14/24 0900    docusate sodium (Colace) capsule 100 mg, 100 mg, oral, BID, Soraya Schneider MD, 100 mg at 01/13/24 4897    heparin (porcine) injection 3,000-6,000 Units, 3,000-6,000 Units, intravenous, q4h PRN, Soraya  MD Wyatt    heparin 25,000 Units in dextrose 5% 250 mL (100 Units/mL) infusion (premix), 0-4,500 Units/hr, intravenous, Continuous, Soraya Schneider MD, Last Rate: 0.1 mL/hr at 01/14/24 0547, 11 Units/hr at 01/14/24 0547    icosapent ethyL (Vascepa) capsule 2 g, 2 g, oral, BID with meals, Soraya Schneider MD, 2 g at 01/14/24 0857    ketorolac (Toradol) injection 15 mg, 15 mg, intravenous, q6h, Soraya Schneider MD, 15 mg at 01/14/24 0900    latanoprost (Xalatan) 0.005 % ophthalmic solution 1 drop, 1 drop, Both Eyes, Nightly, Soraya Schneider MD, 1 drop at 01/13/24 2120    lenalidomide (Revlimid) capsule 10 mg, 10 mg, oral, Daily, Soraya Schneider MD    levothyroxine (Synthroid, Levoxyl) tablet 50 mcg, 50 mcg, oral, Daily, Soraya Schneider MD, 50 mcg at 01/14/24 0859    lidocaine 4 % patch 1 patch, 1 patch, transdermal, Daily, Soraya Schneider MD, 1 patch at 01/14/24 0900    loratadine (Claritin) tablet 10 mg, 10 mg, oral, Daily, Soraya Schneider MD, 10 mg at 01/14/24 0858    magnesium hydroxide (Milk of Magnesia) 2,400 mg/10 mL suspension 10 mL, 10 mL, oral, Daily PRN, Soraya Schneider MD    magnesium oxide (Mag-Ox) tablet 400 mg, 400 mg, oral, Daily, Soraya Schneider MD, 400 mg at 01/14/24 0859    melatonin tablet 5 mg, 5 mg, oral, Nightly PRN, Soraya Schneider MD    ondansetron (Zofran) injection 4 mg, 4 mg, intravenous, q4h PRN, Soraya Schneider MD    sennosides-docusate sodium (Amira-Colace) 8.6-50 mg per tablet 2 tablet, 2 tablet, oral, BID, Soraya Schneider MD, 2 tablet at 01/13/24 2116    timolol (Timoptic) 0.5 % ophthalmic solution 1 drop, 1 drop, ophthalmic (eye), q12h, Soraya Schneider MD, 1 drop at 01/13/24 0659    traMADol (Ultram) tablet 50 mg, 50 mg, oral, q8h PRN, Soraya Schneider MD, 50 mg at 01/13/24 0522    triamterene-hydrochlorothiazid (Maxzide-25) 37.5-25 mg per tablet 1 tablet, 1 tablet, oral, Daily, Soraya Schneider MD, 1 tablet at 01/14/24 0859    valACYclovir (Valtrex) tablet 500 mg, 500 mg, oral, 2 times per day on Mon Wed Fri, Soraya Schneider MD    vitamin A capsule 2.4 mg,  "8,000 Units, oral, Daily, Soraya Schneider MD    vitamin E capsule 400 Units, 400 Units, oral, Daily, Soraya Schneider MD     PRN Medications:  PRN medications   Medication    acetaminophen    alum-mag hydroxide-simeth    heparin    magnesium hydroxide    melatonin    ondansetron    traMADol         IVs:  heparin, 0-4,500 Units/hr, Last Rate: 11 Units/hr (01/14/24 0547)       I&Os     Intake/Output last 3 Shifts:  I/O last 3 completed shifts:  In: - (0 mL/kg)   Out: 2500 (24 mL/kg) [Urine:2500 (0.7 mL/kg/hr)]  Weight: 104.3 kg     VITAL SIGNS     Blood pressure (!) 182/102, pulse 88, temperature 36.7 °C (98.1 °F), temperature source Temporal, resp. rate 18, height 1.778 m (5' 10\"), weight 104 kg (230 lb), SpO2 93 %.     PHYSICAL EXAM   Physical exam:  -General appearance: Is an elderly white female, sitting up in bed, alert, happy because she is no longer having generalized pains.  Daughter is in the room with her  -Vital signs:  As above  -HEENT: No icterus  -Neck: No JVD  -Chest: Lungs are clear   -Cardiac: Regular rhythm  -Abdomen: Bowel sounds present  -Extremities: She has bilateral lower extremity swelling, L >R.  Her left lower extremity edema has improved slightly, she does have thickening of the skin over both lower extremities-not significantly changed.  -Skin: No rash  -Neurologic:   Patient is alert and oriented x3.   -Behavior/Emotional:  Appropriate, cooperative    LABS   Relevant Results:  Results from last 7 days   Lab Units 01/14/24  0718 01/13/24  1115 01/12/24  1159   GLUCOSE mg/dL 105* 109* 85          CBC:   Results from last 7 days   Lab Units 01/14/24  0718 01/13/24  0651 01/12/24  1159   WBC AUTO x10*3/uL 4.8 5.2 5.7   RBC AUTO x10*6/uL 3.09* 2.84* 3.46*   HEMOGLOBIN g/dL 11.0* 10.3* 12.1   HEMATOCRIT % 34.1* 31.9* 38.8   MCV fL 110* 112* 112*   MCH pg 35.6* 36.3* 35.0*   MCHC g/dL 32.3 32.3 31.2*   RDW % 12.9 12.8 12.9   PLATELETS AUTO x10*3/uL 175 175 171       CMP:    Results from last 7 days   Lab " Units 01/14/24  0718 01/13/24  1115 01/12/24  1159   SODIUM mmol/L 144 142 143   POTASSIUM mmol/L 3.4* 3.3* 3.5   CHLORIDE mmol/L 108* 109* 110*   CO2 mmol/L 27 25 23   BUN mg/dL 11 14 17   CREATININE mg/dL 0.92 0.92 1.00   GLUCOSE mg/dL 105* 109* 85   PROTEIN TOTAL g/dL  --   --  6.2*   CALCIUM mg/dL 8.6 8.7 9.0   BILIRUBIN TOTAL mg/dL  --   --  0.7   ALK PHOS U/L  --   --  86   AST U/L  --   --  12   ALT U/L  --   --  11       Magnesium:  Results from last 7 days   Lab Units 01/14/24  0718 01/13/24  1115   MAGNESIUM mg/dL 2.00 1.51*     Magnesium 1.51      IMAGING     CT lumbar spine wo IV contrast   Final Result   Moderate multilevel spondylosis as described, otherwise without acute   findings.        Signed by: Timothy Cm 1/12/2024 1:12 PM   Dictation workstation:   [id number]      CT abdomen pelvis wo IV contrast   Final Result   1.  Large central mesenteric mass, probably due to lymphoma given the   patient's history.   2. Probable constipation. Additional findings as above.        MACRO:   1. None        Signed by: Timothy Cm 1/12/2024 1:31 PM   Dictation workstation:   [id number]      Vascular US lower extremity venous duplex bilateral   Final Result   Findings consistent with deep vein thrombosis throughout the left   femoral vein, as above.        MACRO:   None        Signed by: Everett Schwartz 1/12/2024 12:44 PM   Dictation workstation:   [id number]          Soraya Schneider MD

## 2024-01-14 NOTE — CARE PLAN
The patient's goals for the shift include      The clinical goals for the shift include pt will respond to heparin gtt    Problem: Skin  Goal: Participates in plan/prevention/treatment measures  Outcome: Met  Goal: Prevent/manage excess moisture  Outcome: Met  Goal: Prevent/minimize sheer/friction injuries  Outcome: Met  Goal: Promote/optimize nutrition  Outcome: Met  Goal: Promote skin healing  Outcome: Met     Problem: Pain  Goal: Free from opioid side effects throughout the shift  Outcome: Met     Problem: Safety - Adult  Goal: Free from fall injury  Outcome: Met     Problem: Chronic Conditions and Co-morbidities  Goal: Patient's chronic conditions and co-morbidity symptoms are monitored and maintained or improved  Outcome: Met     Problem: Skin  Goal: Decreased wound size/increased tissue granulation at next dressing change  Outcome: Progressing     Problem: Pain  Goal: Takes deep breaths with improved pain control throughout the shift  Outcome: Progressing  Goal: Turns in bed with improved pain control throughout the shift  Outcome: Progressing  Goal: Performs ADL's with improved pain control throughout shift  Outcome: Progressing     Problem: Pain - Adult  Goal: Verbalizes/displays adequate comfort level or baseline comfort level  Outcome: Progressing     Problem: Discharge Planning  Goal: Discharge to home or other facility with appropriate resources  Outcome: Progressing

## 2024-01-14 NOTE — DISCHARGE INSTRUCTIONS
-You were admitted with a blood clot in your left lower extremity.  You should remain on blood thinners until your PCP determines that they can be stopped.  -Use caution on taking your Celexa Joy/Celebrex, as it can sometimes cause stomach bleeding.  If you develop black stools or start throwing up blood, immediately go to an emergency room.  Tylenol is not a problem.  -You can use tramadol 3 times a day if needed for severe pain.

## 2024-01-14 NOTE — DISCHARGE SUMMARY
DISCHARGE SUMMARY      Laura Woodward  Age:  88 y.o. 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  Patient says she has been having chronic edema both lower extremities, for at least the last year.  Her daughter is having difficulty putting on the compression hose.  It seems to have gotten worse recently.  Then 3 days ago she developed sharp pains in a localized area of her low back on the right.  She is very sedentary, but the pain was so severe that she was unable to get up out of her chair.    There was no history of trauma, she does not think she turned wrong.  She presented to the ER due to worsening pain.  In the ER the patient was mildly hypertensive at 182/95.  Chemistry panel was unremarkable.  CBC unremarkable.   CT of the lumbar spine showed severe multilevel spondylosis without acute findings.  CT of the abdomen and pelvis showed a small right pleural effusion with a fairly large central mesenteric mass.  She did have fecal residue throughout the colon consistent with constipation.  Ultrasound of her lower extremities showed extensive deep venous thrombosis throughout the left femoral vein.  Due to the extent of the clot, patient was started on IV heparin and admitted.  Please refer to admission H&P for further details.    The patient was  admitted on a heparin drip, her magnesium and potassium were replaced.  The following morning the patient was complaining of generalized pain, ketorolac worked well so she was started on scheduled ketorolac and by the morning of discharge her pain had improved significantly.  She completed 2 full days of heparin and was started on apixaban/Eliquis at discharge.    She should follow-up with her PCP,  Dr. Mahnaz Baca in 1 week.  BMP and magnesium should be rechecked at that time.    Procedures:  None    Problem list:  Problem List Items Addressed This Visit          Coag and Thromboembolic    * (Principal) Acute deep vein thrombosis (DVT) of femoral vein of left lower extremity (CMS/HCC) - Primary    Relevant Medications    apixaban (Eliquis) 5 mg tablet    apixaban (Eliquis) 5 mg tablet (Start on 1/21/2024)       Symptoms and Signs    Generalized pain    Relevant Medications    traMADol (Ultram) 50 mg tablet     Other Visit Diagnoses       Lumbar radiculopathy           Disposition:  Home    Issues Requiring Follow-Up:  Follow-up labs for low potassium and magnesium    Condition on Discharge:  Satisfactory    Discharge medications:  Medication List      START taking these medications     * apixaban 5 mg tablet; Commonly known as: Eliquis; Take 2 tablets (10   mg) by mouth 2 times a day for 13 doses.   * apixaban 5 mg tablet; Commonly known as: Eliquis; Take 1 tablet (5 mg)   by mouth 2 times a day. Do not start before January 21, 2024.; Start   taking on: January 21, 2024   traMADol 50 mg tablet; Commonly known as: Ultram; Take 1 tablet (50 mg)   by mouth every 8 hours if needed for moderate pain (4 - 6) for up to 20   doses.  * This list has 2 medication(s) that are the same as other medications   prescribed for you. Read the directions carefully, and ask your doctor or   other care provider to review them with you.     CONTINUE taking these medications     acetaminophen 325 mg tablet; Commonly known  as: Tylenol   amlodipine-olmesartan 10-40 mg tablet; Commonly known as: Celia; TAKE 1   TABLET BY MOUTH ONCE  DAILY   ascorbic acid (vitamin C) 500 mg capsule   Azo Cranberry Plus Vit C 250-60 mg capsule; Generic drug: cranberry   extract-vitamin C   Beulah Low Dose Aspirin 81 mg EC tablet; Generic drug: aspirin   biotin 5,000 mcg tablet,chewable   celecoxib 200 mg capsule; Commonly known as: CeleBREX; TAKE 2 CAPSULES   BY MOUTH ONCE  DAILY   cetirizine 10 mg chewable tablet; Commonly known as: ZyrTEC   cholecalciferol 25 MCG (1000 UT) tablet; Commonly known as: Vitamin D-3   cloNIDine 0.3 mg/24 hr patch; Commonly known as: Catapres-TTS; PLACE 1   PATCH ON THE SKIN ONCE  WEEKLY   cyanocobalamin 1,000 mcg tablet; Commonly known as: Vitamin B-12   darifenacin 15 mg 24 hr tablet; Commonly known as: Enablex; TAKE 1   TABLET BY MOUTH ONCE  DAILY   docusate sodium 100 mg capsule; Commonly known as: Colace   estradiol 0.01 % (0.1 mg/gram) vaginal cream; Commonly known as: Estrace   folic acid 400 mcg tablet; Commonly known as: Folvite   GARLIC ORAL   latanoprost 0.005 % ophthalmic solution; Commonly known as: Xalatan   levothyroxine 50 mcg tablet; Commonly known as: Synthroid, Levoxyl; TAKE   1 TABLET BY MOUTH ONCE  DAILY   magnesium oxide 400 mg (241.3 mg magnesium) tablet; Commonly known as:   Mag-Ox   omega-3 acid ethyl esters 1 gram capsule; Commonly known as: Lovaza   Revlimid 5 mg capsule; Generic drug: lenalidomide   terconazole 0.4 % vaginal cream; Commonly known as: Terazol 7   timolol 0.5 % ophthalmic solution; Commonly known as: Timoptic   trospium 20 mg tablet; Commonly known as: Sanctura   valACYclovir 500 mg tablet; Commonly known as: Valtrex   vitamin A 2,400 mcg capsule   vitamin E 180 mg (400 unit) capsule                                                                           Discharge physical exam:  Vital signs:  Blood pressure 139/66, pulse 75, temperature 37 °C (98.6 °F), temperature source Temporal,  "resp. rate 18, height 1.778 m (5' 10\"), weight 104 kg (230 lb), SpO2 94 %.   At the time of discharge patient was alert, no longer complaining of pain.  Chest was clear, cardiac exam was a regular rhythm.  She still had bilateral lower extremity edema which had improved.  Please refer to progress note this date for full details.    Test Results Pending At Discharge:  None     Code Status: Prior DNR CCA-no documents in the EMR.    Outpatient Follow-Up:  Future Appointments   Date Time Provider Department Center   1/23/2024  1:30 PM Mahanz Baca MD [id number] [place]   10/1/2024 11:15 AM LAURA [id number] ECHO/VASC 4 [id number] [facility]   10/8/2024 11:00 AM Danny Hsu MD [id number] [place]       Soraya Schneider MD  01/14/24       *Some of this note was completed using Dragon voice recognition technology and may include unintended errors with respect to translation of words, typographical errors or grammar errors which may not have been identified prior to finalization of the chart note.  >31 minutes patient care/medication reconciliation/discharge coordination and discussion of discharge instructions & follow-up with the patient.      "

## 2024-01-14 NOTE — PROGRESS NOTES
Family reports pt is at baseline w/ pivot transfers and prefers to take pt home.  Eliquis sent to her pharmacy. No needs per dr wakefield.

## 2024-01-15 ENCOUNTER — PATIENT OUTREACH (OUTPATIENT)
Dept: PRIMARY CARE | Facility: CLINIC | Age: 89
End: 2024-01-15
Payer: MEDICARE

## 2024-01-15 DIAGNOSIS — M47.9 OSTEOARTHRITIS OF SPINE, UNSPECIFIED SPINAL OSTEOARTHRITIS COMPLICATION STATUS, UNSPECIFIED SPINAL REGION: ICD-10-CM

## 2024-01-15 DIAGNOSIS — E83.42 HYPOMAGNESEMIA: ICD-10-CM

## 2024-01-15 DIAGNOSIS — R52 GENERALIZED PAIN: ICD-10-CM

## 2024-01-15 DIAGNOSIS — E87.6 HYPOKALEMIA: ICD-10-CM

## 2024-01-15 DIAGNOSIS — I82.412 ACUTE DEEP VEIN THROMBOSIS (DVT) OF FEMORAL VEIN OF LEFT LOWER EXTREMITY (MULTI): ICD-10-CM

## 2024-01-15 LAB — PATH REVIEW-CBC DIFFERENTIAL: NORMAL

## 2024-01-15 NOTE — PROGRESS NOTES
Discharge Facility:  [facility]    Discharge Diagnosis:  -Acute deep vein thrombosis (DVT) of femoral vein of left lower extremity (CMS/HCC)  -Generalized pain  -Hypokalemia  -Hypomagnesemia  -DJD of the lumbar spine    Admission Date:1/12/24  Discharge Date: 1/14/24    PCP Appointment Date:  Visit Type: Primary Care Established          Date: 1/23/2024                  Dept: [facility]                  Provider: Mahnaz Baca                  Time: 1:30 PM    Specialist Appointment Date:   Encounter Start    5/15/2024  2:15 PM         Anahi Montoya OD  Optometry  [address]  [place] [address]  Phone: [phone]  Hospital Encounter and Summary: Linked   Asessment below for further details  I introduced myself and the TCM program to Laura Woodward. I gave my contact information for any further questions.  Daughter Iram reported to me that Dr Baca is leaving the office and she is requesting that her mom be placed with Dr Estrada as PCP. I suggested she speak to office at follow up to see if that is possible.   Engagement  Call Start Time: 1530 (Spoke wt daughter Soraya) (1/15/2024  3:19 PM)    Medications  Medications reviewed with patient/caregiver?: Yes (1/15/2024  3:19 PM)  Is the patient having any side effects they believe may be caused by any medication additions or changes?: No (1/15/2024  3:19 PM)  Does the patient have all medications ordered at discharge?: Yes (1/15/2024  3:19 PM)  Care Management Interventions: Provided patient education (1/15/2024  3:19 PM)  Prescription Comments: START taking these medications    o * apixaban 5 mg tablet; Commonly known as: Eliquis; Take 2 tablets (10   mg) by mouth 2 times a day for 13 doses.  o * apixaban 5 mg tablet; Commonly known as: Eliquis; Take 1 tablet (5 mg)   by mouth 2 times a day. Do not start before January 21, 2024.; Start   taking on: January 21, 2024  o traMADol 50  mg tablet; Commonly known as: Ultram; Take 1 tablet (50 mg)   by mouth every 8 hours if needed for moderate pain (4 - 6) for up to 20   doses. (1/15/2024  3:19 PM)  Is the patient taking all medications as directed (includes completed medication regime)?: Yes (1/15/2024  3:19 PM)    Appointments  Does the patient have a primary care provider?: Yes (Mahnaz Baca) (1/15/2024  3:19 PM)  Care Management Interventions: Verified appointment date/time/provider (1/23/24-daughter aware) (1/15/2024  3:19 PM)  Has the patient kept scheduled appointments due by today?: Yes (1/15/2024  3:19 PM)    Self Management  Has home health visited the patient within 72 hours of discharge?: Not applicable (1/15/2024  3:19 PM)  What Durable Medical Equipment (DME) was ordered?: compression stockings (1/15/2024  3:19 PM)  Has all Durable Medical Equipment (DME) been delivered?: Yes (1/15/2024  3:19 PM)    Patient Teaching  Does the patient have access to their discharge instructions?: Yes (1/15/2024  3:19 PM)  Care Management Interventions: Reviewed instructions with patient (1/15/2024  3:19 PM)  What is the patient's perception of their health status since discharge?: Improving (1/15/2024  3:19 PM)  Is the patient/caregiver able to teach back the hierarchy of who to call/visit for symptoms/problems? PCP, Specialist, Home Health nurse, Urgent Care, ED, 911: Yes (1/15/2024  3:19 PM)    Wrap Up  Wrap Up Additional Comments: Daughter Soraya is caretaker . Very pleasent to speak with and helpful with care. (1/15/2024  3:19 PM)  Call End Time: 1541 (1/15/2024  3:19 PM)

## 2024-01-15 NOTE — CARE PLAN
The patient's goals for the shift include      The clinical goals for the shift include Patient heparin Assay will be therapeutic by end of shift  Problem: Skin  Goal: Decreased wound size/increased tissue granulation at next dressing change  Outcome: Progressing     Problem: Pain  Goal: Performs ADL's with improved pain control throughout shift  Outcome: Progressing     Problem: Pain  Goal: Takes deep breaths with improved pain control throughout the shift  Outcome: Met  Goal: Turns in bed with improved pain control throughout the shift  Outcome: Met  Goal: Free from acute confusion related to pain meds throughout the shift  Outcome: Met     Problem: Pain - Adult  Goal: Verbalizes/displays adequate comfort level or baseline comfort level  Outcome: Met     Problem: Discharge Planning  Goal: Discharge to home or other facility with appropriate resources  Outcome: Met

## 2024-01-16 ENCOUNTER — APPOINTMENT (OUTPATIENT)
Dept: PRIMARY CARE | Facility: CLINIC | Age: 89
End: 2024-01-16
Payer: MEDICARE

## 2024-01-23 ENCOUNTER — APPOINTMENT (OUTPATIENT)
Dept: PRIMARY CARE | Facility: CLINIC | Age: 89
End: 2024-01-23
Payer: MEDICARE

## 2024-01-29 ENCOUNTER — TELEMEDICINE (OUTPATIENT)
Dept: PRIMARY CARE | Facility: CLINIC | Age: 89
End: 2024-01-29
Payer: MEDICARE

## 2024-01-29 DIAGNOSIS — E87.6 HYPOKALEMIA: ICD-10-CM

## 2024-01-29 DIAGNOSIS — E83.42 HYPOMAGNESEMIA: ICD-10-CM

## 2024-01-29 DIAGNOSIS — J01.10 ACUTE NON-RECURRENT FRONTAL SINUSITIS: Primary | ICD-10-CM

## 2024-01-29 PROCEDURE — 99442 PR PHYS/QHP TELEPHONE EVALUATION 11-20 MIN: CPT | Performed by: STUDENT IN AN ORGANIZED HEALTH CARE EDUCATION/TRAINING PROGRAM

## 2024-01-29 RX ORDER — L. ACIDOPH/L. PLANTAR/L. RHAMN 1B CELL
1 CAPSULE,DELAYED RELEASE (ENTERIC COATED) ORAL DAILY
Qty: 30 CAPSULE | Refills: 1 | Status: SHIPPED | OUTPATIENT
Start: 2024-01-29

## 2024-01-29 RX ORDER — DOXYCYCLINE 100 MG/1
100 CAPSULE ORAL 2 TIMES DAILY
Qty: 14 CAPSULE | Refills: 0 | Status: SHIPPED | OUTPATIENT
Start: 2024-01-29 | End: 2024-02-05

## 2024-01-29 ASSESSMENT — ENCOUNTER SYMPTOMS
LOSS OF SENSATION IN FEET: 1
OCCASIONAL FEELINGS OF UNSTEADINESS: 0
DEPRESSION: 1

## 2024-01-29 NOTE — PROGRESS NOTES
Subjective   Patient ID: Laura Woodward is a 88 y.o. female who presents for Cough (Patients daughter Soraya advised that patient has been experiencing productive cough and and congestion for the past 5 days. Patient is taking OTC delsym and Claritin.  Soraya advised that patient started with audible wheezing today and she is short of breath. /Daughter also advised that patient was recently discharged from [facility] for DVT  1/14/2024).    5 days of productive cough and congestion  Sinus pressure and drainage  Rhinorrhea, sore throat  Wheezing this morning according daughter  OTCs not helping much  11 minute call with daughter and patient    Plan  Bmp and mag for deficiencies found in hospital  On eliquis for DVT  Doxy and probiotic, nasal saline at home, continue OTC cough suppressant as this has been working well, vit c, vit d, honey, hydration, rest  Follow up as needed    Virtual or Telephone Consent    A telephone visit (audio only) between the patient (at the originating site) and the provider (at the distant site) was utilized to provide this telehealth service.   Verbal consent was requested and obtained from Laura Woodward on this date, 01/29/24 for a telehealth visit.      HPI     Review of Systems    Objective   There were no vitals taken for this visit.    Physical Exam    Assessment/Plan   Problem List Items Addressed This Visit             ICD-10-CM    Hypokalemia E87.6    Relevant Orders    Basic metabolic panel    Hypomagnesemia E83.42    Relevant Orders    Magnesium     Other Visit Diagnoses         Codes    Acute non-recurrent frontal sinusitis    -  Primary J01.10    Relevant Medications    doxycycline (Vibramycin) 100 mg capsule    L.acidophilus-Bifido.longum (Probiotic Pearls Acidophilus) 1 billion cell capsule,delayed release(DR/EC)

## 2024-02-01 ENCOUNTER — APPOINTMENT (OUTPATIENT)
Dept: PRIMARY CARE | Facility: CLINIC | Age: 89
End: 2024-02-01
Payer: MEDICARE

## 2024-02-06 DIAGNOSIS — J01.10 ACUTE NON-RECURRENT FRONTAL SINUSITIS: Primary | ICD-10-CM

## 2024-02-06 DIAGNOSIS — I82.412 ACUTE DEEP VEIN THROMBOSIS (DVT) OF FEMORAL VEIN OF LEFT LOWER EXTREMITY (MULTI): ICD-10-CM

## 2024-02-06 RX ORDER — BENZONATATE 100 MG/1
100 CAPSULE ORAL 3 TIMES DAILY PRN
Qty: 42 CAPSULE | Refills: 0 | Status: SHIPPED | OUTPATIENT
Start: 2024-02-06 | End: 2024-03-02 | Stop reason: HOSPADM

## 2024-02-06 NOTE — TELEPHONE ENCOUNTER
The patient did a telehealth visit with you and she is feeling better but she still has a lingering cough and some chest congestion. The y would like to know if there is anything she could do for the congestion. The patient also had been in the hospital with a DVT she was given Eliquis 5 mg bud. She is almost out and they want to know if you can refill this for her. They stated they can do another tele visit . They could not get in until march to establish with Dr. King . Please advise .    It can be sent to her retail pharmacy

## 2024-02-07 ENCOUNTER — PATIENT OUTREACH (OUTPATIENT)
Dept: PRIMARY CARE | Facility: CLINIC | Age: 89
End: 2024-02-07
Payer: MEDICARE

## 2024-02-07 NOTE — PROGRESS NOTES
Call regarding appt. with PCP on 1/29/24 after hospitalization.  At time of outreach call the patient feels as if their condition is about the same since last visit.  Reviewed the PCP appointment with the pt and addressed any questions or concerns.   Daughter had concern because she reported pts does not have cough and was reading med called in and said suppressant. She was thinking that she needs an expectant.   I advised her to call PCP now to let them know. She also reported that left leg that had DVT seems to be starting to swell again even with compression stocking as she has been up moving around some more.   I told her to make sure she mentions this as well to PCP office. She is trying to get an appt and will call now.  She will call me back if needs help reaching out

## 2024-02-08 ENCOUNTER — TELEPHONE (OUTPATIENT)
Dept: PRIMARY CARE | Facility: CLINIC | Age: 89
End: 2024-02-08
Payer: MEDICARE

## 2024-02-08 DIAGNOSIS — J01.10 ACUTE NON-RECURRENT FRONTAL SINUSITIS: Primary | ICD-10-CM

## 2024-02-08 RX ORDER — GUAIFENESIN 600 MG/1
600 TABLET, EXTENDED RELEASE ORAL 2 TIMES DAILY
Qty: 20 TABLET | Refills: 0 | Status: SHIPPED | OUTPATIENT
Start: 2024-02-08 | End: 2024-02-18

## 2024-02-08 NOTE — TELEPHONE ENCOUNTER
Patients daughter Soraya called the office advising that her mother is still sick. Soraya states that she was given a prescription for Benzonatate to help suppress her cough but Soraya states that her mother has a lot of built up phlegm and she needs something to help get the phlegm out.   Soraya also stated that her mothers leg is still swollen. The Eliquis does seem to be helping but the swelling is not going down like it did when the patient was getting the heparin in the hospital.

## 2024-02-09 DIAGNOSIS — I10 ESSENTIAL HYPERTENSION: ICD-10-CM

## 2024-02-12 RX ORDER — CLONIDINE 0.3 MG/24H
PATCH, EXTENDED RELEASE TRANSDERMAL
Qty: 4 PATCH | Refills: 5 | Status: SHIPPED | OUTPATIENT
Start: 2024-02-12 | End: 2024-03-21 | Stop reason: SDUPTHER

## 2024-02-20 ENCOUNTER — TELEPHONE (OUTPATIENT)
Dept: PRIMARY CARE | Facility: CLINIC | Age: 89
End: 2024-02-20
Payer: MEDICARE

## 2024-02-20 NOTE — TELEPHONE ENCOUNTER
Nelda the patient's daughter call the office today and stated that her mom is still sick and she has a runny nose and with chest congestion. She states she was feeling better then starting feel bad again. Please advise . Do you want to do a phone appointment ?

## 2024-02-22 ENCOUNTER — TELEMEDICINE (OUTPATIENT)
Dept: PRIMARY CARE | Facility: CLINIC | Age: 89
End: 2024-02-22
Payer: MEDICARE

## 2024-02-22 DIAGNOSIS — J01.10 ACUTE NON-RECURRENT FRONTAL SINUSITIS: Primary | ICD-10-CM

## 2024-02-22 PROCEDURE — 1159F MED LIST DOCD IN RCRD: CPT | Performed by: STUDENT IN AN ORGANIZED HEALTH CARE EDUCATION/TRAINING PROGRAM

## 2024-02-22 PROCEDURE — 99441 PR PHYS/QHP TELEPHONE EVALUATION 5-10 MIN: CPT | Performed by: STUDENT IN AN ORGANIZED HEALTH CARE EDUCATION/TRAINING PROGRAM

## 2024-02-22 PROCEDURE — 1125F AMNT PAIN NOTED PAIN PRSNT: CPT | Performed by: STUDENT IN AN ORGANIZED HEALTH CARE EDUCATION/TRAINING PROGRAM

## 2024-02-22 PROCEDURE — 1036F TOBACCO NON-USER: CPT | Performed by: STUDENT IN AN ORGANIZED HEALTH CARE EDUCATION/TRAINING PROGRAM

## 2024-02-22 RX ORDER — GUAIFENESIN 600 MG/1
600 TABLET, EXTENDED RELEASE ORAL 2 TIMES DAILY
Qty: 60 TABLET | Refills: 1 | Status: SHIPPED | OUTPATIENT
Start: 2024-02-22 | End: 2024-03-02 | Stop reason: HOSPADM

## 2024-02-22 RX ORDER — DOXYCYCLINE 100 MG/1
100 CAPSULE ORAL 2 TIMES DAILY
Qty: 14 CAPSULE | Refills: 0 | Status: SHIPPED | OUTPATIENT
Start: 2024-02-22 | End: 2024-03-02 | Stop reason: HOSPADM

## 2024-02-22 NOTE — PROGRESS NOTES
Subjective   Patient ID: Laura Woodward is a 88 y.o. female who presents for URI (Patient is in office today for continuation of URI symptoms. Patients daughter advises that was back to normal for about 4-5 days after she finished her prescription given last month. But then she started to have the runny nose, congestion, productive cough and wheezing. Daughter advises that since she is a cancer patient she doesn't know that medications OTC medications. Daughter advises that she has been giving mom vitamin C, elderberry and zinc. ).    2/22/24- return of same symptoms from last month  Chest congestion  No fever  Productive cough    Plan  Add doxycycline again, mucinex  Conservative treaments  Continue probiotic  Monitor symptoms carefully  Follow up as needed  5 minute call with daughter and patient, no signs of distress    Virtual or Telephone Consent    A telephone visit (audio only) between the patient (at the originating site) and the provider (at the distant site) was utilized to provide this telehealth service.   Verbal consent was requested and obtained from Laura Woodward on this date, 02/22/24 for a telehealth visit.       1/29/24- Patient ID: Laura Woodward is a 88 y.o. female who presents for Cough (Patients daughter Soraya advised that patient has been experiencing productive cough and and congestion for the past 5 days. Patient is taking OTC delsym and Claritin.  Soraya advised that patient started with audible wheezing today and she is short of breath. /Daughter also advised that patient was recently discharged from [facility] for DVT  1/14/2024).     5 days of productive cough and congestion  Sinus pressure and drainage  Rhinorrhea, sore throat  Wheezing this morning according daughter  OTCs not helping much  11 minute call with daughter and patient     Plan  Bmp and mag for deficiencies found in hospital  On eliquis for DVT  Doxy and probiotic, nasal saline at home, continue OTC cough  suppressant as this has been working well, vit c, vit d, honey, hydration, rest  Follow up as needed    HPI     Review of Systems    Objective   There were no vitals taken for this visit.    Physical Exam    Assessment/Plan   Problem List Items Addressed This Visit    None  Visit Diagnoses         Codes    Acute non-recurrent frontal sinusitis    -  Primary J01.10    Relevant Medications    doxycycline (Vibramycin) 100 mg capsule    guaiFENesin (Mucinex) 600 mg 12 hr tablet

## 2024-02-23 ENCOUNTER — APPOINTMENT (OUTPATIENT)
Dept: CARDIOLOGY | Facility: CLINIC | Age: 89
End: 2024-02-23
Payer: MEDICARE

## 2024-02-26 ENCOUNTER — APPOINTMENT (OUTPATIENT)
Dept: RADIOLOGY | Facility: HOSPITAL | Age: 89
DRG: 308 | End: 2024-02-26
Payer: MEDICARE

## 2024-02-26 ENCOUNTER — APPOINTMENT (OUTPATIENT)
Dept: CARDIOLOGY | Facility: HOSPITAL | Age: 89
DRG: 308 | End: 2024-02-26
Payer: MEDICARE

## 2024-02-26 ENCOUNTER — HOSPITAL ENCOUNTER (INPATIENT)
Facility: HOSPITAL | Age: 89
LOS: 4 days | Discharge: HOME | DRG: 308 | End: 2024-03-02
Attending: STUDENT IN AN ORGANIZED HEALTH CARE EDUCATION/TRAINING PROGRAM | Admitting: INTERNAL MEDICINE
Payer: MEDICARE

## 2024-02-26 ENCOUNTER — TELEPHONE (OUTPATIENT)
Dept: PRIMARY CARE | Facility: CLINIC | Age: 89
End: 2024-02-26
Payer: MEDICARE

## 2024-02-26 DIAGNOSIS — I10 ESSENTIAL HYPERTENSION: ICD-10-CM

## 2024-02-26 DIAGNOSIS — I50.31 ACUTE DIASTOLIC CONGESTIVE HEART FAILURE (MULTI): ICD-10-CM

## 2024-02-26 DIAGNOSIS — I48.91 ATRIAL FIBRILLATION (MULTI): ICD-10-CM

## 2024-02-26 DIAGNOSIS — E83.42 HYPOMAGNESEMIA: ICD-10-CM

## 2024-02-26 DIAGNOSIS — I48.92 ATRIAL FLUTTER, UNSPECIFIED TYPE (MULTI): Primary | ICD-10-CM

## 2024-02-26 DIAGNOSIS — E87.6 HYPOKALEMIA: ICD-10-CM

## 2024-02-26 DIAGNOSIS — I48.91 ATRIAL FIBRILLATION, UNSPECIFIED TYPE (MULTI): ICD-10-CM

## 2024-02-26 DIAGNOSIS — E87.79 OTHER HYPERVOLEMIA: ICD-10-CM

## 2024-02-26 DIAGNOSIS — I48.0 PAF (PAROXYSMAL ATRIAL FIBRILLATION) (MULTI): ICD-10-CM

## 2024-02-26 LAB
ALBUMIN SERPL BCP-MCNC: 3.5 G/DL (ref 3.4–5)
ALP SERPL-CCNC: 81 U/L (ref 33–136)
ALT SERPL W P-5'-P-CCNC: 23 U/L (ref 7–45)
ANION GAP SERPL CALC-SCNC: 14 MMOL/L (ref 10–20)
AST SERPL W P-5'-P-CCNC: 16 U/L (ref 9–39)
BASOPHILS # BLD AUTO: 0.03 X10*3/UL (ref 0–0.1)
BASOPHILS NFR BLD AUTO: 1.1 %
BILIRUB SERPL-MCNC: 0.6 MG/DL (ref 0–1.2)
BNP SERPL-MCNC: 401 PG/ML (ref 0–99)
BUN SERPL-MCNC: 15 MG/DL (ref 6–23)
CALCIUM SERPL-MCNC: 7.8 MG/DL (ref 8.6–10.3)
CARDIAC TROPONIN I PNL SERPL HS: 17 NG/L (ref 0–13)
CARDIAC TROPONIN I PNL SERPL HS: 18 NG/L (ref 0–13)
CHLORIDE SERPL-SCNC: 108 MMOL/L (ref 98–107)
CO2 SERPL-SCNC: 26 MMOL/L (ref 21–32)
CREAT SERPL-MCNC: 1.07 MG/DL (ref 0.5–1.05)
EGFRCR SERPLBLD CKD-EPI 2021: 50 ML/MIN/1.73M*2
EOSINOPHIL # BLD AUTO: 0.23 X10*3/UL (ref 0–0.4)
EOSINOPHIL NFR BLD AUTO: 8.5 %
ERYTHROCYTE [DISTWIDTH] IN BLOOD BY AUTOMATED COUNT: 13.1 % (ref 11.5–14.5)
FLUAV RNA RESP QL NAA+PROBE: NOT DETECTED
FLUBV RNA RESP QL NAA+PROBE: NOT DETECTED
GLUCOSE SERPL-MCNC: 94 MG/DL (ref 74–99)
HCT VFR BLD AUTO: 35.4 % (ref 36–46)
HGB BLD-MCNC: 11.3 G/DL (ref 12–16)
IMM GRANULOCYTES # BLD AUTO: 0.01 X10*3/UL (ref 0–0.5)
IMM GRANULOCYTES NFR BLD AUTO: 0.4 % (ref 0–0.9)
INR PPP: 2.1 (ref 0.9–1.1)
LACTATE SERPL-SCNC: 0.8 MMOL/L (ref 0.4–2)
LYMPHOCYTES # BLD AUTO: 1.04 X10*3/UL (ref 0.8–3)
LYMPHOCYTES NFR BLD AUTO: 38.5 %
MAGNESIUM SERPL-MCNC: 1.29 MG/DL (ref 1.6–2.4)
MCH RBC QN AUTO: 34.1 PG (ref 26–34)
MCHC RBC AUTO-ENTMCNC: 31.9 G/DL (ref 32–36)
MCV RBC AUTO: 107 FL (ref 80–100)
MONOCYTES # BLD AUTO: 0.43 X10*3/UL (ref 0.05–0.8)
MONOCYTES NFR BLD AUTO: 15.9 %
NEUTROPHILS # BLD AUTO: 0.96 X10*3/UL (ref 1.6–5.5)
NEUTROPHILS NFR BLD AUTO: 35.6 %
NRBC BLD-RTO: 0 /100 WBCS (ref 0–0)
PLATELET # BLD AUTO: 105 X10*3/UL (ref 150–450)
POTASSIUM SERPL-SCNC: 2.7 MMOL/L (ref 3.5–5.3)
PROT SERPL-MCNC: 5.2 G/DL (ref 6.4–8.2)
PROTHROMBIN TIME: 23.8 SECONDS (ref 9.8–12.8)
RBC # BLD AUTO: 3.31 X10*6/UL (ref 4–5.2)
RSV RNA RESP QL NAA+PROBE: NOT DETECTED
SARS-COV-2 RNA RESP QL NAA+PROBE: NOT DETECTED
SODIUM SERPL-SCNC: 145 MMOL/L (ref 136–145)
WBC # BLD AUTO: 2.7 X10*3/UL (ref 4.4–11.3)

## 2024-02-26 PROCEDURE — 71275 CT ANGIOGRAPHY CHEST: CPT

## 2024-02-26 PROCEDURE — 93970 EXTREMITY STUDY: CPT

## 2024-02-26 PROCEDURE — 83880 ASSAY OF NATRIURETIC PEPTIDE: CPT | Performed by: PHYSICIAN ASSISTANT

## 2024-02-26 PROCEDURE — G0378 HOSPITAL OBSERVATION PER HR: HCPCS

## 2024-02-26 PROCEDURE — 87637 SARSCOV2&INF A&B&RSV AMP PRB: CPT | Performed by: PHYSICIAN ASSISTANT

## 2024-02-26 PROCEDURE — 83605 ASSAY OF LACTIC ACID: CPT | Performed by: PHYSICIAN ASSISTANT

## 2024-02-26 PROCEDURE — 2500000001 HC RX 250 WO HCPCS SELF ADMINISTERED DRUGS (ALT 637 FOR MEDICARE OP): Performed by: STUDENT IN AN ORGANIZED HEALTH CARE EDUCATION/TRAINING PROGRAM

## 2024-02-26 PROCEDURE — 2500000001 HC RX 250 WO HCPCS SELF ADMINISTERED DRUGS (ALT 637 FOR MEDICARE OP): Performed by: INTERNAL MEDICINE

## 2024-02-26 PROCEDURE — 85025 COMPLETE CBC W/AUTO DIFF WBC: CPT | Performed by: PHYSICIAN ASSISTANT

## 2024-02-26 PROCEDURE — 83735 ASSAY OF MAGNESIUM: CPT | Performed by: PHYSICIAN ASSISTANT

## 2024-02-26 PROCEDURE — 36415 COLL VENOUS BLD VENIPUNCTURE: CPT | Performed by: PHYSICIAN ASSISTANT

## 2024-02-26 PROCEDURE — 71045 X-RAY EXAM CHEST 1 VIEW: CPT | Mod: FOREIGN READ | Performed by: RADIOLOGY

## 2024-02-26 PROCEDURE — 84443 ASSAY THYROID STIM HORMONE: CPT | Performed by: INTERNAL MEDICINE

## 2024-02-26 PROCEDURE — 84484 ASSAY OF TROPONIN QUANT: CPT | Performed by: PHYSICIAN ASSISTANT

## 2024-02-26 PROCEDURE — 2500000004 HC RX 250 GENERAL PHARMACY W/ HCPCS (ALT 636 FOR OP/ED): Performed by: PHYSICIAN ASSISTANT

## 2024-02-26 PROCEDURE — 85610 PROTHROMBIN TIME: CPT | Performed by: PHYSICIAN ASSISTANT

## 2024-02-26 PROCEDURE — 93005 ELECTROCARDIOGRAM TRACING: CPT

## 2024-02-26 PROCEDURE — 2550000001 HC RX 255 CONTRASTS: Performed by: STUDENT IN AN ORGANIZED HEALTH CARE EDUCATION/TRAINING PROGRAM

## 2024-02-26 PROCEDURE — 84075 ASSAY ALKALINE PHOSPHATASE: CPT | Performed by: PHYSICIAN ASSISTANT

## 2024-02-26 PROCEDURE — 96368 THER/DIAG CONCURRENT INF: CPT

## 2024-02-26 PROCEDURE — 2500000002 HC RX 250 W HCPCS SELF ADMINISTERED DRUGS (ALT 637 FOR MEDICARE OP, ALT 636 FOR OP/ED): Performed by: PHYSICIAN ASSISTANT

## 2024-02-26 PROCEDURE — 99285 EMERGENCY DEPT VISIT HI MDM: CPT | Mod: 25

## 2024-02-26 PROCEDURE — 71275 CT ANGIOGRAPHY CHEST: CPT | Mod: FOREIGN READ | Performed by: RADIOLOGY

## 2024-02-26 PROCEDURE — 96365 THER/PROPH/DIAG IV INF INIT: CPT

## 2024-02-26 PROCEDURE — 71045 X-RAY EXAM CHEST 1 VIEW: CPT

## 2024-02-26 PROCEDURE — 96366 THER/PROPH/DIAG IV INF ADDON: CPT

## 2024-02-26 PROCEDURE — 93971 EXTREMITY STUDY: CPT | Mod: FOREIGN READ | Performed by: RADIOLOGY

## 2024-02-26 RX ORDER — POTASSIUM CHLORIDE 14.9 MG/ML
20 INJECTION INTRAVENOUS
Status: COMPLETED | OUTPATIENT
Start: 2024-02-26 | End: 2024-02-26

## 2024-02-26 RX ORDER — POTASSIUM CHLORIDE 20 MEQ/1
40 TABLET, EXTENDED RELEASE ORAL ONCE
Status: COMPLETED | OUTPATIENT
Start: 2024-02-26 | End: 2024-02-26

## 2024-02-26 RX ORDER — ASPIRIN 81 MG/1
81 TABLET ORAL DAILY
Status: DISCONTINUED | OUTPATIENT
Start: 2024-02-27 | End: 2024-03-02 | Stop reason: HOSPADM

## 2024-02-26 RX ORDER — MAGNESIUM SULFATE HEPTAHYDRATE 40 MG/ML
2 INJECTION, SOLUTION INTRAVENOUS ONCE
Status: COMPLETED | OUTPATIENT
Start: 2024-02-26 | End: 2024-02-26

## 2024-02-26 RX ORDER — MAGNESIUM HYDROXIDE 2400 MG/10ML
10 SUSPENSION ORAL DAILY PRN
Status: DISCONTINUED | OUTPATIENT
Start: 2024-02-26 | End: 2024-03-02 | Stop reason: HOSPADM

## 2024-02-26 RX ORDER — ACETAMINOPHEN 500 MG
5 TABLET ORAL NIGHTLY PRN
Status: DISCONTINUED | OUTPATIENT
Start: 2024-02-26 | End: 2024-03-02 | Stop reason: HOSPADM

## 2024-02-26 RX ORDER — ONDANSETRON HYDROCHLORIDE 2 MG/ML
4 INJECTION, SOLUTION INTRAVENOUS EVERY 4 HOURS PRN
Status: DISCONTINUED | OUTPATIENT
Start: 2024-02-26 | End: 2024-03-02 | Stop reason: HOSPADM

## 2024-02-26 RX ORDER — CLONIDINE 0.3 MG/24H
1 PATCH, EXTENDED RELEASE TRANSDERMAL
Status: DISCONTINUED | OUTPATIENT
Start: 2024-03-02 | End: 2024-03-02 | Stop reason: HOSPADM

## 2024-02-26 RX ORDER — ALUMINUM HYDROXIDE, MAGNESIUM HYDROXIDE, AND SIMETHICONE 1200; 120; 1200 MG/30ML; MG/30ML; MG/30ML
30 SUSPENSION ORAL 4 TIMES DAILY PRN
Status: DISCONTINUED | OUTPATIENT
Start: 2024-02-26 | End: 2024-03-02 | Stop reason: HOSPADM

## 2024-02-26 RX ORDER — ACETAMINOPHEN 325 MG/1
650 TABLET ORAL ONCE
Status: COMPLETED | OUTPATIENT
Start: 2024-02-26 | End: 2024-02-26

## 2024-02-26 RX ORDER — ACETAMINOPHEN 325 MG/1
650 TABLET ORAL EVERY 4 HOURS PRN
Status: DISCONTINUED | OUTPATIENT
Start: 2024-02-26 | End: 2024-03-02 | Stop reason: HOSPADM

## 2024-02-26 RX ORDER — TRAMADOL HYDROCHLORIDE 50 MG/1
50 TABLET ORAL ONCE
Status: COMPLETED | OUTPATIENT
Start: 2024-02-26 | End: 2024-02-26

## 2024-02-26 RX ADMIN — IOHEXOL 75 ML: 350 INJECTION, SOLUTION INTRAVENOUS at 15:18

## 2024-02-26 RX ADMIN — TRAMADOL HYDROCHLORIDE 50 MG: 50 TABLET, COATED ORAL at 20:58

## 2024-02-26 RX ADMIN — APIXABAN 5 MG: 5 TABLET, FILM COATED ORAL at 23:51

## 2024-02-26 RX ADMIN — POTASSIUM CHLORIDE 20 MEQ: 14.9 INJECTION, SOLUTION INTRAVENOUS at 15:35

## 2024-02-26 RX ADMIN — POTASSIUM CHLORIDE 20 MEQ: 14.9 INJECTION, SOLUTION INTRAVENOUS at 14:06

## 2024-02-26 RX ADMIN — ACETAMINOPHEN 650 MG: 325 TABLET ORAL at 16:59

## 2024-02-26 RX ADMIN — POTASSIUM CHLORIDE 40 MEQ: 1500 TABLET, EXTENDED RELEASE ORAL at 14:06

## 2024-02-26 RX ADMIN — MAGNESIUM SULFATE HEPTAHYDRATE 2 G: 40 INJECTION, SOLUTION INTRAVENOUS at 14:57

## 2024-02-26 SDOH — SOCIAL STABILITY: SOCIAL INSECURITY: DO YOU FEEL UNSAFE GOING BACK TO THE PLACE WHERE YOU ARE LIVING?: NO

## 2024-02-26 SDOH — SOCIAL STABILITY: SOCIAL INSECURITY: ARE THERE ANY APPARENT SIGNS OF INJURIES/BEHAVIORS THAT COULD BE RELATED TO ABUSE/NEGLECT?: NO

## 2024-02-26 SDOH — SOCIAL STABILITY: SOCIAL INSECURITY: HAS ANYONE EVER THREATENED TO HURT YOUR FAMILY OR YOUR PETS?: NO

## 2024-02-26 SDOH — SOCIAL STABILITY: SOCIAL INSECURITY: DOES ANYONE TRY TO KEEP YOU FROM HAVING/CONTACTING OTHER FRIENDS OR DOING THINGS OUTSIDE YOUR HOME?: NO

## 2024-02-26 SDOH — SOCIAL STABILITY: SOCIAL INSECURITY: DO YOU FEEL ANYONE HAS EXPLOITED OR TAKEN ADVANTAGE OF YOU FINANCIALLY OR OF YOUR PERSONAL PROPERTY?: NO

## 2024-02-26 SDOH — SOCIAL STABILITY: SOCIAL INSECURITY: WERE YOU ABLE TO COMPLETE ALL THE BEHAVIORAL HEALTH SCREENINGS?: YES

## 2024-02-26 SDOH — SOCIAL STABILITY: SOCIAL INSECURITY: ABUSE: ADULT

## 2024-02-26 SDOH — SOCIAL STABILITY: SOCIAL INSECURITY: HAVE YOU HAD THOUGHTS OF HARMING ANYONE ELSE?: YES

## 2024-02-26 SDOH — SOCIAL STABILITY: SOCIAL INSECURITY: ARE YOU OR HAVE YOU BEEN THREATENED OR ABUSED PHYSICALLY, EMOTIONALLY, OR SEXUALLY BY ANYONE?: NO

## 2024-02-26 ASSESSMENT — ACTIVITIES OF DAILY LIVING (ADL)
ADEQUATE_TO_COMPLETE_ADL: YES
DRESSING YOURSELF: INDEPENDENT
FEEDING YOURSELF: INDEPENDENT
HEARING - LEFT EAR: FUNCTIONAL
GROOMING: INDEPENDENT
BATHING: INDEPENDENT
WALKS IN HOME: INDEPENDENT
PATIENT'S MEMORY ADEQUATE TO SAFELY COMPLETE DAILY ACTIVITIES?: YES
TOILETING: NEEDS ASSISTANCE
JUDGMENT_ADEQUATE_SAFELY_COMPLETE_DAILY_ACTIVITIES: YES
LACK_OF_TRANSPORTATION: NO
HEARING - RIGHT EAR: FUNCTIONAL
ASSISTIVE_DEVICE: WALKER

## 2024-02-26 ASSESSMENT — PATIENT HEALTH QUESTIONNAIRE - PHQ9
1. LITTLE INTEREST OR PLEASURE IN DOING THINGS: NOT AT ALL
2. FEELING DOWN, DEPRESSED OR HOPELESS: NOT AT ALL
SUM OF ALL RESPONSES TO PHQ9 QUESTIONS 1 & 2: 0

## 2024-02-26 ASSESSMENT — LIFESTYLE VARIABLES
EVER HAD A DRINK FIRST THING IN THE MORNING TO STEADY YOUR NERVES TO GET RID OF A HANGOVER: NO
EVER FELT BAD OR GUILTY ABOUT YOUR DRINKING: NO
HOW OFTEN DO YOU HAVE 6 OR MORE DRINKS ON ONE OCCASION: NEVER
AUDIT-C TOTAL SCORE: 0
HAVE YOU EVER FELT YOU SHOULD CUT DOWN ON YOUR DRINKING: NO
HOW OFTEN DO YOU HAVE A DRINK CONTAINING ALCOHOL: NEVER
PRESCIPTION_ABUSE_PAST_12_MONTHS: NO
AUDIT-C TOTAL SCORE: 0
SUBSTANCE_ABUSE_PAST_12_MONTHS: NO
HAVE PEOPLE ANNOYED YOU BY CRITICIZING YOUR DRINKING: NO
SKIP TO QUESTIONS 9-10: 1
HOW MANY STANDARD DRINKS CONTAINING ALCOHOL DO YOU HAVE ON A TYPICAL DAY: PATIENT DOES NOT DRINK

## 2024-02-26 ASSESSMENT — PAIN SCALES - GENERAL
PAINLEVEL_OUTOF10: 5 - MODERATE PAIN
PAINLEVEL_OUTOF10: 9
PAINLEVEL_OUTOF10: 10 - WORST POSSIBLE PAIN

## 2024-02-26 ASSESSMENT — COGNITIVE AND FUNCTIONAL STATUS - GENERAL
STANDING UP FROM CHAIR USING ARMS: A LITTLE
DRESSING REGULAR LOWER BODY CLOTHING: A LITTLE
PERSONAL GROOMING: A LITTLE
HELP NEEDED FOR BATHING: A LITTLE
DAILY ACTIVITIY SCORE: 19
MOBILITY SCORE: 18
TURNING FROM BACK TO SIDE WHILE IN FLAT BAD: A LITTLE
PATIENT BASELINE BEDBOUND: NO
TOILETING: A LITTLE
MOVING TO AND FROM BED TO CHAIR: A LITTLE
WALKING IN HOSPITAL ROOM: A LITTLE
CLIMB 3 TO 5 STEPS WITH RAILING: A LOT
DRESSING REGULAR UPPER BODY CLOTHING: A LITTLE

## 2024-02-26 ASSESSMENT — PAIN DESCRIPTION - ORIENTATION: ORIENTATION: LEFT

## 2024-02-26 ASSESSMENT — PAIN DESCRIPTION - LOCATION: LOCATION: RIB CAGE

## 2024-02-26 ASSESSMENT — PAIN - FUNCTIONAL ASSESSMENT
PAIN_FUNCTIONAL_ASSESSMENT: 0-10

## 2024-02-26 ASSESSMENT — COLUMBIA-SUICIDE SEVERITY RATING SCALE - C-SSRS
2. HAVE YOU ACTUALLY HAD ANY THOUGHTS OF KILLING YOURSELF?: NO
1. IN THE PAST MONTH, HAVE YOU WISHED YOU WERE DEAD OR WISHED YOU COULD GO TO SLEEP AND NOT WAKE UP?: NO
6. HAVE YOU EVER DONE ANYTHING, STARTED TO DO ANYTHING, OR PREPARED TO DO ANYTHING TO END YOUR LIFE?: NO

## 2024-02-26 ASSESSMENT — PAIN DESCRIPTION - PAIN TYPE: TYPE: ACUTE PAIN

## 2024-02-26 NOTE — TELEPHONE ENCOUNTER
Pt's daughter called to state that pt still has chest congestion and now her right leg is swollen and pink. Per Dr. NORM Estrada pt needs to go to the ED due to the congestion lasting 3 weeks and the right leg. Per Dr. Estrada, the ED could do a better evaluation and get results back quicker. Daughter, Soraya informed and verbalized understanding. Soraya stated that she is taking pt to the ED.

## 2024-02-26 NOTE — ED PROVIDER NOTES
"HPI   Chief Complaint   Patient presents with    Cough     \"Here for flu like symptoms with chest congestion and right leg redness and swelling.\"       An 88-year-old female patient with history of lymphedema, hypertension, DVT comes to the emergency department today with complaints of right lower extremity erythema with associated edema.  States she was diagnosed roughly a little over a month ago with DVT left lower extremity.  States his leg has continued to increase in size despite being placed on Eliquis.  States today she noticed that her right lower extremity was erythematous with associated edema.  She was concerned about potential blood clot in this leg as well.  States she started having achiness yesterday evening in the leg and did wake her up multiple times throughout the night.  Secondarily she also complains of left-sided anterolateral rib pain.  States she has had a cough that she describes as nonproductive.  She believes that this discomfort is related to that.  She otherwise has no other complaints this present time for this purpose comes in the emergency department today for further evaluation.                          Debby Coma Scale Score: 15                     Patient History   Past Medical History:   Diagnosis Date    Encounter for preprocedural cardiovascular examination 01/31/2017    Pre-operative cardiovascular examination    HL (hearing loss)     Patient states hard of hearing    Lymphedema, not elsewhere classified 12/16/2022    Lymphedema    Obesity, unspecified 11/10/2022    Class 1 obesity with body mass index (BMI) of 33.0 to 33.9 in adult    Obesity, unspecified 10/27/2022    Class 1 obesity with body mass index (BMI) of 34.0 to 34.9 in adult    Other bursitis of hip, left hip 10/12/2021    Hip bursitis, left    Other conditions influencing health status 01/31/2017    Cardiovascular Stress Test    Other seasonal allergic rhinitis 11/10/2022    Seasonal allergies    Other specified " noninflammatory disorders of vagina 06/23/2022    Vaginal lesion    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    History of malignant lymphoma    Personal history of non-Hodgkin lymphomas     History of lymphoma    Personal history of other diseases of the circulatory system 01/25/2017    History of abnormal electrocardiography    Personal history of other diseases of the circulatory system     History of coronary artery disease    Personal history of other diseases of the digestive system 04/20/2022    History of rectal bleeding    Personal history of other diseases of the digestive system 06/06/2022    History of rectal bleeding    Personal history of other diseases of the digestive system 06/06/2022    History of constipation    Personal history of other diseases of the digestive system 04/19/2022    History of anal fissures    Personal history of other diseases of the respiratory system 10/27/2022    History of upper respiratory infection    Personal history of other diseases of urinary system 12/16/2022    History of renal insufficiency syndrome    Personal history of other endocrine, nutritional and metabolic disease 10/12/2021    History of obesity    Personal history of other infectious and parasitic diseases 06/23/2022    History of herpes simplex infection    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    History of other malignant neoplasm of large intestine    Personal history of other specified conditions 06/23/2022    History of urinary frequency    Personal history of other specified conditions 06/23/2022    History of gross hematuria    Personal history of other specified conditions 01/13/2022    History of dysuria    Personal history of other specified conditions 01/04/2022    History of dysuria    Persons encountering health services in other specified circumstances 10/12/2021    Encounter to establish care with new doctor    Subacute and chronic vaginitis  01/13/2022    Chronic vaginitis    Subacute and chronic vaginitis 01/04/2022    Subacute and chronic vaginitis    Trochanteric bursitis, left hip 06/06/2022    Greater trochanteric bursitis of left hip    Unspecified disorder of synovium and tendon, left thigh 06/06/2022    Tendinopathy of left gluteus medius    Unspecified disorder of synovium and tendon, left thigh 10/12/2021    Tendinopathy of left gluteus medius    Unspecified symptoms and signs involving the genitourinary system 01/04/2022    UTI symptoms    Unspecified symptoms and signs involving the genitourinary system     UTI symptoms    Urge incontinence 06/23/2022    Urge incontinence     Past Surgical History:   Procedure Laterality Date    OTHER SURGICAL HISTORY  06/06/2022    Colonoscopy    OTHER SURGICAL HISTORY  10/12/2021    Tonsillectomy    OTHER SURGICAL HISTORY  10/12/2021    Cholecystectomy    OTHER SURGICAL HISTORY  10/12/2021    Sinus surgery    OTHER SURGICAL HISTORY  10/12/2021    Appendectomy    OTHER SURGICAL HISTORY  10/12/2021    Hernia repair    OTHER SURGICAL HISTORY  10/12/2021    Bladder surgery    OTHER SURGICAL HISTORY  10/12/2021    Knee replacement    OTHER SURGICAL HISTORY  10/12/2021    Cataract surgery    OTHER SURGICAL HISTORY  10/12/2021    Dilation and curettage    OTHER SURGICAL HISTORY  10/12/2021    Revision knee arthroplasty    OTHER SURGICAL HISTORY  10/12/2021    Neck surgery    OTHER SURGICAL HISTORY  10/12/2021    Carpal tunnel surgery     Family History   Problem Relation Name Age of Onset    Hypertension Mother      Glaucoma Mother       Social History     Tobacco Use    Smoking status: Former     Types: Cigarettes    Smokeless tobacco: Never   Vaping Use    Vaping Use: Never used   Substance Use Topics    Alcohol use: Not Currently    Drug use: Never       Physical Exam   ED Triage Vitals [02/26/24 1149]   Temperature Heart Rate Respirations BP   36.6 °C (97.9 °F) 68 18 (!) 162/95      Pulse Ox Temp Source Heart  Rate Source Patient Position   97 % Temporal Monitor Sitting      BP Location FiO2 (%)     Right arm --       Physical Exam  Constitutional:       General: She is in acute distress (Mild distress).      Appearance: Normal appearance. She is ill-appearing.   HENT:      Head: Normocephalic and atraumatic.      Nose: Nose normal.   Eyes:      Extraocular Movements: Extraocular movements intact.      Conjunctiva/sclera: Conjunctivae normal.      Pupils: Pupils are equal, round, and reactive to light.   Cardiovascular:      Rate and Rhythm: Normal rate and regular rhythm.   Pulmonary:      Effort: Pulmonary effort is normal. No respiratory distress.      Breath sounds: Normal breath sounds. No stridor. No wheezing.   Musculoskeletal:         General: Normal range of motion.      Cervical back: Normal range of motion.      Right lower leg: Edema present.      Left lower leg: Edema present.   Skin:     General: Skin is warm and dry.      Findings: Erythema (Mild erythema over the anterior right shin with no associated warmth.  Chronic erythema left anterior shin) present.   Neurological:      General: No focal deficit present.      Mental Status: She is alert and oriented to person, place, and time. Mental status is at baseline.   Psychiatric:         Mood and Affect: Mood normal.         ED Course & MDM   Diagnoses as of 02/26/24 1606   Hypokalemia   Hypomagnesemia       Medical Decision Making  An 88-year-old female patient with history of lymphedema, hypertension, DVT comes to the emergency department today with complaints of right lower extremity erythema with associated edema.  States she was diagnosed roughly a little over a month ago with DVT left lower extremity.  States his leg has continued to increase in size despite being placed on Eliquis.  States today she noticed that her right lower extremity was erythematous with associated edema.  She was concerned about potential blood clot in this leg as well.  States  she started having achiness yesterday evening in the leg and did wake her up multiple times throughout the night.  Secondarily she also complains of left-sided anterolateral rib pain.  States she has had a cough that she describes as nonproductive.  She believes that this discomfort is related to that.  She otherwise has no other complaints this present time for this purpose comes in the emergency department today for further evaluation.    EKG, chest x-ray, laboratory studies ordered as well as PE study of the chest to rule out pulmonary emboli.  DVT studies ordered of the bilateral lower extremities to rule out increasing DVT in the left lower extremity as well as to rule out acute or new DVT right lower extremity.  Rule out ACS, arrhythmias, electrolyte abnormalities, leukocytosis.  Rule out COVID-19 influenza RSV.    Patient negative for influenza COVID-19 RSV.  Patient's metabolic panel shows a potassium of 2.7 creatinine 1.07 GFR 50.  P.o. and IV potassium ordered for the patient.  Magnesium ordered secondary to this.  Magnesium came back 1.29 IV magnesium ordered for the patient.  Patient CBC shows a white blood cell count 2.7.  Hemoglobin 11.3.    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  This consistent with patient's past ultrasound study.  Negative chest x-ray.    Patient's EKGs look like a flutter with variable block.  Patient has no history of a flutter or A-fib.  Patient is anticoagulated on her Eliquis secondary to her left lower extremity DVT.    Pending PE study.  Will admit patient to the hospital once this returns    Handoff to [employer] pending PE study, reevaluation disposition.    Historian is the patient    Diagnosis: Hypokalemia, hypomagnesia      Historians patient    Diagnosis: Hypokalemia, hypomagnesemia   Labs Reviewed   CBC WITH AUTO DIFFERENTIAL - Abnormal       Result Value    WBC 2.7 (*)     nRBC 0.0      RBC 3.31 (*)     Hemoglobin 11.3 (*)      Hematocrit 35.4 (*)      (*)     MCH 34.1 (*)     MCHC 31.9 (*)     RDW 13.1      Platelets 105 (*)     Neutrophils % 35.6      Immature Granulocytes %, Automated 0.4      Lymphocytes % 38.5      Monocytes % 15.9      Eosinophils % 8.5      Basophils % 1.1      Neutrophils Absolute 0.96 (*)     Immature Granulocytes Absolute, Automated 0.01      Lymphocytes Absolute 1.04      Monocytes Absolute 0.43      Eosinophils Absolute 0.23      Basophils Absolute 0.03     COMPREHENSIVE METABOLIC PANEL - Abnormal    Glucose 94      Sodium 145      Potassium 2.7 (*)     Chloride 108 (*)     Bicarbonate 26      Anion Gap 14      Urea Nitrogen 15      Creatinine 1.07 (*)     eGFR 50 (*)     Calcium 7.8 (*)     Albumin 3.5      Alkaline Phosphatase 81      Total Protein 5.2 (*)     AST 16      Bilirubin, Total 0.6      ALT 23     PROTIME-INR - Abnormal    Protime 23.8 (*)     INR 2.1 (*)    B-TYPE NATRIURETIC PEPTIDE - Abnormal     (*)     Narrative:        <100 pg/mL - Heart failure unlikely  100-299 pg/mL - Intermediate probability of acute heart                  failure exacerbation. Correlate with clinical                  context and patient history.    >=300 pg/mL - Heart Failure likely. Correlate with clinical                  context and patient history.    BNP testing is performed using different testing methodology at [facility] than at other [facility] hospitals. Direct result comparisons should only be made within the same method.      SERIAL TROPONIN-INITIAL - Abnormal    Troponin I, High Sensitivity 18 (*)     Narrative:     Less than 99th percentile of normal range cutoff-  Female and children under 18 years old <14 ng/L; Male <21 ng/L: Negative  Repeat testing should be performed if clinically indicated.     Female and children under 18 years old 14-50 ng/L; Male 21-50 ng/L:  Consistent with possible cardiac damage and possible increased clinical   risk. Serial measurements may help to  assess extent of myocardial damage.     >50 ng/L: Consistent with cardiac damage, increased clinical risk and  myocardial infarction. Serial measurements may help assess extent of   myocardial damage.      NOTE: Children less than 1 year old may have higher baseline troponin   levels and results should be interpreted in conjunction with the overall   clinical context.     NOTE: Troponin I testing is performed using a different   testing methodology at [facility] than at other   [facility]. Direct result comparisons should only   be made within the same method.   SERIAL TROPONIN, 1 HOUR - Abnormal    Troponin I, High Sensitivity 17 (*)     Narrative:     Less than 99th percentile of normal range cutoff-  Female and children under 18 years old <14 ng/L; Male <21 ng/L: Negative  Repeat testing should be performed if clinically indicated.     Female and children under 18 years old 14-50 ng/L; Male 21-50 ng/L:  Consistent with possible cardiac damage and possible increased clinical   risk. Serial measurements may help to assess extent of myocardial damage.     >50 ng/L: Consistent with cardiac damage, increased clinical risk and  myocardial infarction. Serial measurements may help assess extent of   myocardial damage.      NOTE: Children less than 1 year old may have higher baseline troponin   levels and results should be interpreted in conjunction with the overall   clinical context.     NOTE: Troponin I testing is performed using a different   testing methodology at [facility] than at other   [facility]. Direct result comparisons should only   be made within the same method.   MAGNESIUM - Abnormal    Magnesium 1.29 (*)    LACTATE - Normal    Lactate 0.8      Narrative:     Venipuncture immediately after or during the administration of Metamizole may lead to falsely low results. Testing should be performed immediately  prior to Metamizole dosing.   SARS-COV-2 AND INFLUENZA A/B PCR  - Normal    Flu A Result Not Detected      Flu B Result Not Detected      Coronavirus 2019, PCR Not Detected      Narrative:     This assay has received FDA Emergency Use Authorization (EUA) and  is only authorized for the duration of time that circumstances exist to justify the authorization of the emergency use of in vitro diagnostic tests for the detection of SARS-CoV-2 virus and/or diagnosis of COVID-19 infection under section 564(b)(1) of the Act, 21 U.S.C. 360bbb-3(b)(1). Testing for SARS-CoV-2 is only recommended for patients who meet current clinical and/or epidemiological criteria as defined by federal, state, or local public health directives. This assay is an in vitro diagnostic nucleic acid amplification test for the qualitative detection of SARS-CoV-2, Influenza A, and Influenza B from nasopharyngeal specimens and has been validated for use at [facility]. Negative results do not preclude COVID-19 infections or Influenza A/B infections, and should not be used as the sole basis for diagnosis, treatment, or other management decisions. If Influenza A/B and RSV PCR results are negative, testing for Parainfluenza virus, Adenovirus and Metapneumovirus is routinely performed for [facility] pediatric oncology and intensive care inpatients, and is available on other patients by placing an add-on request.    RSV PCR - Normal    RSV PCR Not Detected      Narrative:     This assay is an FDA-cleared, in vitro diagnostic nucleic acid amplification test for the detection of RSV from nasopharyngeal specimens, and has been validated for use at [facility]. Negative results do not preclude RSV infections, and should not be used as the sole basis for diagnosis, treatment, or other management decisions. If Influenza A/B and RSV PCR results are negative, testing for Parainfluenza virus, Adenovirus and Metapneumovirus is routinely performed for pediatric oncology and intensive care  inpatients at [facility], and is available on other patients by placing an add-on request.       TROPONIN SERIES- (INITIAL, 1 HR)    Narrative:     The following orders were created for panel order Troponin I Series, High Sensitivity (0, 1 HR).  Procedure                               Abnormality         Status                     ---------                               -----------         ------                     Troponin I, High Sensiti...[[id number]]  Abnormal            Final result               Troponin, High Sensitivi...[[id number]]  Abnormal            Final result                 Please view results for these tests on the individual orders.        CT angio chest for pulmonary embolism   Final Result   1. No pulmonary emboli.   2. Small to moderate right pleural effusion.   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     No evidence for DVT within the right lower extremity.     Bilateral calf veins not visualized due to edema.   Findings similar to prior ultrasound of 01/12/2024.   Findings discussed by telephone as a critical result with Dr. Konrad Lewis on 2/26/2024 at 1340 hours.   Signed by Anders Estrada MD      XR chest 1 view   Final Result   1. No acute pulmonary pathology.   2. Left port is unchanged.   Signed by Ildefonso Oneal MD          Procedure  ECG 12 lead    Performed by: Konrad Lewis PA-C  Authorized by: Konrad Lewis PA-C    Interpretation:     Details:  My EKG interpretation  Rate:     ECG rate:  59  Rhythm:     Rhythm comment:  A flutter with variable block  ST segments:     ST segments:  Normal  T waves:     T waves: non-specific         Konrad Lewis PA-C  02/26/24 1500

## 2024-02-27 ENCOUNTER — APPOINTMENT (OUTPATIENT)
Dept: CARDIOLOGY | Facility: HOSPITAL | Age: 89
DRG: 308 | End: 2024-02-27
Payer: MEDICARE

## 2024-02-27 PROBLEM — I48.92 ATRIAL FLUTTER, UNSPECIFIED TYPE (MULTI): Status: ACTIVE | Noted: 2024-02-27

## 2024-02-27 LAB
ANION GAP SERPL CALC-SCNC: 11 MMOL/L (ref 10–20)
ATRIAL RATE: 52 BPM
ATRIAL RATE: 70 BPM
BUN SERPL-MCNC: 12 MG/DL (ref 6–23)
CALCIUM SERPL-MCNC: 7.9 MG/DL (ref 8.6–10.3)
CHLORIDE SERPL-SCNC: 110 MMOL/L (ref 98–107)
CO2 SERPL-SCNC: 27 MMOL/L (ref 21–32)
CREAT SERPL-MCNC: 0.94 MG/DL (ref 0.5–1.05)
EGFRCR SERPLBLD CKD-EPI 2021: 59 ML/MIN/1.73M*2
ERYTHROCYTE [DISTWIDTH] IN BLOOD BY AUTOMATED COUNT: 12.9 % (ref 11.5–14.5)
GLUCOSE SERPL-MCNC: 84 MG/DL (ref 74–99)
HCT VFR BLD AUTO: 36.5 % (ref 36–46)
HGB BLD-MCNC: 11.7 G/DL (ref 12–16)
MAGNESIUM SERPL-MCNC: 1.6 MG/DL (ref 1.6–2.4)
MCH RBC QN AUTO: 34.6 PG (ref 26–34)
MCHC RBC AUTO-ENTMCNC: 32.1 G/DL (ref 32–36)
MCV RBC AUTO: 108 FL (ref 80–100)
NRBC BLD-RTO: 0 /100 WBCS (ref 0–0)
PLATELET # BLD AUTO: 113 X10*3/UL (ref 150–450)
POTASSIUM SERPL-SCNC: 3 MMOL/L (ref 3.5–5.3)
Q ONSET: 216 MS
Q ONSET: 217 MS
QRS COUNT: 10 BEATS
QRS COUNT: 10 BEATS
QRS DURATION: 100 MS
QRS DURATION: 90 MS
QT INTERVAL: 458 MS
QT INTERVAL: 464 MS
QTC CALCULATION(BAZETT): 453 MS
QTC CALCULATION(BAZETT): 467 MS
QTC FREDERICIA: 455 MS
QTC FREDERICIA: 466 MS
R AXIS: 28 DEGREES
R AXIS: 48 DEGREES
RBC # BLD AUTO: 3.38 X10*6/UL (ref 4–5.2)
SODIUM SERPL-SCNC: 145 MMOL/L (ref 136–145)
T AXIS: -26 DEGREES
T AXIS: -50 DEGREES
T OFFSET: 446 MS
T OFFSET: 448 MS
TSH SERPL-ACNC: 1.26 MIU/L (ref 0.44–3.98)
VENTRICULAR RATE: 59 BPM
VENTRICULAR RATE: 61 BPM
WBC # BLD AUTO: 2.6 X10*3/UL (ref 4.4–11.3)

## 2024-02-27 PROCEDURE — 80048 BASIC METABOLIC PNL TOTAL CA: CPT | Performed by: INTERNAL MEDICINE

## 2024-02-27 PROCEDURE — 99223 1ST HOSP IP/OBS HIGH 75: CPT | Performed by: INTERNAL MEDICINE

## 2024-02-27 PROCEDURE — 85027 COMPLETE CBC AUTOMATED: CPT | Performed by: INTERNAL MEDICINE

## 2024-02-27 PROCEDURE — 1200000002 HC GENERAL ROOM WITH TELEMETRY DAILY

## 2024-02-27 PROCEDURE — 2500000004 HC RX 250 GENERAL PHARMACY W/ HCPCS (ALT 636 FOR OP/ED): Performed by: INTERNAL MEDICINE

## 2024-02-27 PROCEDURE — 83735 ASSAY OF MAGNESIUM: CPT | Performed by: INTERNAL MEDICINE

## 2024-02-27 PROCEDURE — 2500000001 HC RX 250 WO HCPCS SELF ADMINISTERED DRUGS (ALT 637 FOR MEDICARE OP): Performed by: NURSE PRACTITIONER

## 2024-02-27 PROCEDURE — 2500000001 HC RX 250 WO HCPCS SELF ADMINISTERED DRUGS (ALT 637 FOR MEDICARE OP): Performed by: INTERNAL MEDICINE

## 2024-02-27 PROCEDURE — 2500000002 HC RX 250 W HCPCS SELF ADMINISTERED DRUGS (ALT 637 FOR MEDICARE OP, ALT 636 FOR OP/ED): Performed by: INTERNAL MEDICINE

## 2024-02-27 RX ORDER — ESTRADIOL 0.1 MG/G
0.01 CREAM VAGINAL NIGHTLY
Status: DISCONTINUED | OUTPATIENT
Start: 2024-02-27 | End: 2024-03-02 | Stop reason: HOSPADM

## 2024-02-27 RX ORDER — AMLODIPINE AND OLMESARTAN MEDOXOMIL 10; 40 MG/1; MG/1
1 TABLET ORAL DAILY
Status: CANCELLED | OUTPATIENT
Start: 2024-02-27

## 2024-02-27 RX ORDER — AMLODIPINE AND OLMESARTAN MEDOXOMIL 10; 40 MG/1; MG/1
1 TABLET ORAL
Status: DISCONTINUED | OUTPATIENT
Start: 2024-02-27 | End: 2024-03-02 | Stop reason: HOSPADM

## 2024-02-27 RX ORDER — LATANOPROST 50 UG/ML
1 SOLUTION/ DROPS OPHTHALMIC NIGHTLY
Status: DISCONTINUED | OUTPATIENT
Start: 2024-02-27 | End: 2024-03-02 | Stop reason: HOSPADM

## 2024-02-27 RX ORDER — ICOSAPENT ETHYL 1 G/1
2 CAPSULE ORAL
Status: DISCONTINUED | OUTPATIENT
Start: 2024-02-27 | End: 2024-03-02 | Stop reason: HOSPADM

## 2024-02-27 RX ORDER — LENALIDOMIDE 5 MG/1
5 CAPSULE ORAL DAILY
Status: DISCONTINUED | OUTPATIENT
Start: 2024-02-27 | End: 2024-03-02 | Stop reason: HOSPADM

## 2024-02-27 RX ORDER — LEVOTHYROXINE SODIUM 50 UG/1
50 TABLET ORAL DAILY
Status: DISCONTINUED | OUTPATIENT
Start: 2024-02-27 | End: 2024-03-02 | Stop reason: HOSPADM

## 2024-02-27 RX ORDER — METOPROLOL TARTRATE 25 MG/1
12.5 TABLET, FILM COATED ORAL 2 TIMES DAILY
Status: DISPENSED | OUTPATIENT
Start: 2024-02-27 | End: 2024-03-02

## 2024-02-27 RX ORDER — POTASSIUM CHLORIDE 20 MEQ/1
20 TABLET, EXTENDED RELEASE ORAL ONCE
Status: COMPLETED | OUTPATIENT
Start: 2024-02-27 | End: 2024-02-27

## 2024-02-27 RX ORDER — VALACYCLOVIR HYDROCHLORIDE 500 MG/1
250 TABLET, FILM COATED ORAL
Status: DISCONTINUED | OUTPATIENT
Start: 2024-02-28 | End: 2024-03-02 | Stop reason: HOSPADM

## 2024-02-27 RX ORDER — FUROSEMIDE 10 MG/ML
20 INJECTION INTRAMUSCULAR; INTRAVENOUS 2 TIMES DAILY
Status: DISCONTINUED | OUTPATIENT
Start: 2024-02-27 | End: 2024-03-02

## 2024-02-27 RX ORDER — TIMOLOL MALEATE 5 MG/ML
1 SOLUTION/ DROPS OPHTHALMIC EVERY 12 HOURS
Status: DISCONTINUED | OUTPATIENT
Start: 2024-02-27 | End: 2024-03-02 | Stop reason: HOSPADM

## 2024-02-27 RX ADMIN — ASPIRIN 81 MG: 81 TABLET, COATED ORAL at 08:48

## 2024-02-27 RX ADMIN — LEVOTHYROXINE SODIUM 50 MCG: 50 TABLET ORAL at 10:52

## 2024-02-27 RX ADMIN — LATANOPROST 1 DROP: 50 SOLUTION OPHTHALMIC at 21:18

## 2024-02-27 RX ADMIN — POTASSIUM CHLORIDE 20 MEQ: 1500 TABLET, EXTENDED RELEASE ORAL at 10:52

## 2024-02-27 RX ADMIN — ACETAMINOPHEN 650 MG: 325 TABLET ORAL at 21:18

## 2024-02-27 RX ADMIN — APIXABAN 5 MG: 5 TABLET, FILM COATED ORAL at 08:48

## 2024-02-27 RX ADMIN — TIMOLOL MALEATE 1 DROP: 5 SOLUTION/ DROPS OPHTHALMIC at 10:51

## 2024-02-27 RX ADMIN — FUROSEMIDE 20 MG: 10 INJECTION, SOLUTION INTRAMUSCULAR; INTRAVENOUS at 13:17

## 2024-02-27 RX ADMIN — ICOSAPENT ETHYL 2 G: 1 CAPSULE ORAL at 17:25

## 2024-02-27 RX ADMIN — APIXABAN 5 MG: 5 TABLET, FILM COATED ORAL at 21:18

## 2024-02-27 RX ADMIN — ACETAMINOPHEN 650 MG: 325 TABLET ORAL at 02:33

## 2024-02-27 RX ADMIN — METOPROLOL TARTRATE 12.5 MG: 25 TABLET, FILM COATED ORAL at 13:17

## 2024-02-27 RX ADMIN — METOPROLOL TARTRATE 12.5 MG: 25 TABLET, FILM COATED ORAL at 21:17

## 2024-02-27 RX ADMIN — Medication 5 MG: at 21:18

## 2024-02-27 RX ADMIN — Medication 5 MG: at 02:33

## 2024-02-27 ASSESSMENT — COGNITIVE AND FUNCTIONAL STATUS - GENERAL
TOILETING: A LITTLE
CLIMB 3 TO 5 STEPS WITH RAILING: A LOT
DRESSING REGULAR LOWER BODY CLOTHING: A LITTLE
TURNING FROM BACK TO SIDE WHILE IN FLAT BAD: A LITTLE
WALKING IN HOSPITAL ROOM: A LITTLE
PERSONAL GROOMING: A LITTLE
DRESSING REGULAR UPPER BODY CLOTHING: A LITTLE
MOBILITY SCORE: 18
STANDING UP FROM CHAIR USING ARMS: A LITTLE
DAILY ACTIVITIY SCORE: 19
HELP NEEDED FOR BATHING: A LITTLE
MOVING TO AND FROM BED TO CHAIR: A LITTLE

## 2024-02-27 NOTE — CONSULTS
Inpatient consult to Cardiology  Consult performed by: ARCADIO Stewart-CNP  Consult ordered by: Soraya Schneider MD  Reason for consult: new  onset a-fib      Cardiology Consult Note      Date:   2/27/2024  Patient name:  Laura Woodward  Date of admission:  2/26/2024 11:53 AM  MRN:   [id number]  YOB: 1935  Time of Consult:  12:59 PM    Consulting Cardiologist: ARCADIO Menjivar, CNP  Primary Cardiologist:  Dr. Danny Hsu    Referring Provider: Dr Schneider      Admission Diagnosis:     Atrial fibrillation (CMS/HCC)      History of Present Illness:      Laura Woodward is a 88 y.o.  female patient who is being at the request of Dr. Schneider for inpatient consultation of CHF. She was admitted on 2/26/2024.  Previous [facility] and [facility] records have been reviewed in detail.    Patient with a history of DVT, hypertension, morbid obesity came into the emergency department complaining of cough and congestion and fatigue some palpitations been going on and off for the past 1 month.  Her daughter is at the bedside spoke to her at length she states that she had seen Dr. Hsu back in October she had a hernia when seen for preop clearance she had been doing very well.  She comes in found to be in new onset atrial fibrillation so they kindly asked cardiology to get involved with her case.  She denies fever, chills, PND, orthopnea, claudications.  Positive for shortness of breath with exertion, peripheral edema, palpitations  EKG is A-fib no acute changes  Troponin 17    Cardiac  history  9/28/23  CONCLUSIONS:  1. Left ventricular systolic function is normal with a 60% estimated ejection fraction.  2. Spectral Doppler shows an impaired relaxation pattern of left ventricular diastolic filling.  3. There is moderate concentric left ventricular hypertrophy.  4. There is no evidence of mitral valve stenosis.  5. Trace mitral valve regurgitation.  6. Moderate tricuspid  regurgitation.  7. Moderate aortic valve stenosis.  8. The aortic valve appears tricuspid with restriction.  9. There is moderate aortic valve cusp calcification.  10. Moderately elevated pulmonary artery pressure.    10/2/23  IMPRESSION:  Normal Lexiscan Myoview cardiac perfusion stress test.  No evidence of ischemia or myocardial infarction by perfusion imaging.  Normal left ventricular systolic function, ejection fraction 66%.  Noninvasive risk stratification: Low risk.  No previous available for comparison.       Allergies:     Allergies   Allergen Reactions    Augmentin [Amoxicillin-Pot Clavulanate] Nausea/vomiting       Past Medical History:     Past Medical History:   Diagnosis Date    Encounter for preprocedural cardiovascular examination 01/31/2017    Pre-operative cardiovascular examination    HL (hearing loss)     Patient states hard of hearing    Lymphedema, not elsewhere classified 12/16/2022    Lymphedema    Obesity, unspecified 11/10/2022    Class 1 obesity with body mass index (BMI) of 33.0 to 33.9 in adult    Obesity, unspecified 10/27/2022    Class 1 obesity with body mass index (BMI) of 34.0 to 34.9 in adult    Other bursitis of hip, left hip 10/12/2021    Hip bursitis, left    Other conditions influencing health status 01/31/2017    Cardiovascular Stress Test    Other seasonal allergic rhinitis 11/10/2022    Seasonal allergies    Other specified noninflammatory disorders of vagina 06/23/2022    Vaginal lesion    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    History of malignant lymphoma    Personal history of non-Hodgkin lymphomas     History of lymphoma    Personal history of other diseases of the circulatory system 01/25/2017    History of abnormal electrocardiography    Personal history of other diseases of the circulatory system     History of coronary artery disease    Personal history of other diseases of the digestive system 04/20/2022    History of rectal bleeding    Personal  history of other diseases of the digestive system 06/06/2022    History of rectal bleeding    Personal history of other diseases of the digestive system 06/06/2022    History of constipation    Personal history of other diseases of the digestive system 04/19/2022    History of anal fissures    Personal history of other diseases of the respiratory system 10/27/2022    History of upper respiratory infection    Personal history of other diseases of urinary system 12/16/2022    History of renal insufficiency syndrome    Personal history of other endocrine, nutritional and metabolic disease 10/12/2021    History of obesity    Personal history of other infectious and parasitic diseases 06/23/2022    History of herpes simplex infection    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    History of other malignant neoplasm of large intestine    Personal history of other specified conditions 06/23/2022    History of urinary frequency    Personal history of other specified conditions 06/23/2022    History of gross hematuria    Personal history of other specified conditions 01/13/2022    History of dysuria    Personal history of other specified conditions 01/04/2022    History of dysuria    Persons encountering health services in other specified circumstances 10/12/2021    Encounter to establish care with new doctor    Subacute and chronic vaginitis 01/13/2022    Chronic vaginitis    Subacute and chronic vaginitis 01/04/2022    Subacute and chronic vaginitis    Trochanteric bursitis, left hip 06/06/2022    Greater trochanteric bursitis of left hip    Unspecified disorder of synovium and tendon, left thigh 06/06/2022    Tendinopathy of left gluteus medius    Unspecified disorder of synovium and tendon, left thigh 10/12/2021    Tendinopathy of left gluteus medius    Unspecified symptoms and signs involving the genitourinary system 01/04/2022    UTI symptoms    Unspecified symptoms and signs involving  the genitourinary system     UTI symptoms    Urge incontinence 06/23/2022    Urge incontinence       Past Surgical History:     Past Surgical History:   Procedure Laterality Date    OTHER SURGICAL HISTORY  06/06/2022    Colonoscopy    OTHER SURGICAL HISTORY  10/12/2021    Tonsillectomy    OTHER SURGICAL HISTORY  10/12/2021    Cholecystectomy    OTHER SURGICAL HISTORY  10/12/2021    Sinus surgery    OTHER SURGICAL HISTORY  10/12/2021    Appendectomy    OTHER SURGICAL HISTORY  10/12/2021    Hernia repair    OTHER SURGICAL HISTORY  10/12/2021    Bladder surgery    OTHER SURGICAL HISTORY  10/12/2021    Knee replacement    OTHER SURGICAL HISTORY  10/12/2021    Cataract surgery    OTHER SURGICAL HISTORY  10/12/2021    Dilation and curettage    OTHER SURGICAL HISTORY  10/12/2021    Revision knee arthroplasty    OTHER SURGICAL HISTORY  10/12/2021    Neck surgery    OTHER SURGICAL HISTORY  10/12/2021    Carpal tunnel surgery       Family History:     Family History   Problem Relation Name Age of Onset    Hypertension Mother      Glaucoma Mother         Social History:     Social History     Tobacco Use    Smoking status: Former     Types: Cigarettes    Smokeless tobacco: Never   Vaping Use    Vaping Use: Never used   Substance Use Topics    Alcohol use: Not Currently    Drug use: Never       CURRENT INPATIENT MEDICATIONS    amlodipine-olmesartan, 1 tablet, oral, Daily  apixaban, 5 mg, oral, BID  aspirin, 81 mg, oral, Daily  [START ON 3/2/2024] cloNIDine, 1 patch, transdermal, Weekly  estradiol, 0.01 g, vaginal, Nightly  furosemide, 20 mg, intravenous, BID  icosapent ethyL, 2 g, oral, BID with meals  latanoprost, 1 drop, Both Eyes, Nightly  lenalidomide, 5 mg, oral, Daily  levothyroxine, 50 mcg, oral, Daily  metoprolol tartrate, 12.5 mg, oral, BID  timolol, 1 drop, ophthalmic (eye), q12h  [START ON 2/28/2024] valACYclovir, 250 mg, oral, Every Mon/Wed/Fri         Current Outpatient Medications   Medication Instructions     acetaminophen (TYLENOL) 650 mg, oral, Every 6 hours PRN    amlodipine-olmesartan (Celia) 10-40 mg tablet 1 tablet, oral, Daily RT    apixaban (ELIQUIS) 10 mg, oral, 2 times daily    apixaban (ELIQUIS) 5 mg, oral, 2 times daily    aspirin (NELDA LOW DOSE ASPIRIN) 81 mg, oral, Daily    benzonatate (TESSALON) 100 mg, oral, 3 times daily PRN, Do not crush or chew.    celecoxib (CELEBREX) 400 mg, oral, Daily    cloNIDine (Catapres-TTS) 0.3 mg/24 hr patch PLACE 1 PATCH ON THE SKIN ONCE  WEEKLY    darifenacin (ENABLEX) 15 mg, oral, Daily    docusate sodium (COLACE) 100 mg, oral, 2 times daily    doxycycline (VIBRAMYCIN) 100 mg, oral, 2 times daily, Take with at least 8 ounces (large glass) of water, do not lie down for 30 minutes after    estradiol (ESTRACE) 0.01 g, vaginal, Nightly, 3 x a week    guaiFENesin (MUCINEX) 600 mg, oral, 2 times daily, Do not crush, chew, or split.    L.acidophilus-Bifido.longum (Probiotic Pearls Acidophilus) 1 billion cell capsule,delayed release(DR/EC) 1 capsule, oral, Daily    latanoprost (Xalatan) 0.005 % ophthalmic solution 1 drop, Both Eyes, Nightly    levothyroxine (SYNTHROID, LEVOXYL) 50 mcg, oral, Daily    omega-3 acid ethyl esters (LOVAZA) 1 g, oral, 2 times daily    Revlimid 5 mg, oral, Daily, As directed per oncology    terconazole (Terazol 7) 0.4 % vaginal cream 1 applicator, vaginal, Nightly, Every night for 7 days    timolol (Timoptic) 0.5 % ophthalmic solution 1 drop, ophthalmic (eye), Every 12 hours    traMADol (ULTRAM) 50 mg, oral, Every 8 hours PRN    trospium (SANCTURA) 20 mg, oral, Every 12 hours    valACYclovir (VALTREX) 500 mg, oral, Daily, 1/2 tablet twice daily 3 times a week. M,W,F     vitamin E 400 Units, oral, Daily        Review of Systems:      12 point review of systems was obtained in detail and is negative other than that detailed above.      Vital Signs:     Vitals:    02/26/24 2214 02/26/24 2245 02/27/24 0248 02/27/24 0824   BP: 149/74 (!) 217/98 118/71 172/77    BP Location: Left arm  Left arm Left arm   Patient Position: Lying  Lying Lying   Pulse: 61 56 55 73   Resp: 18  18 22   Temp:  35.7 °C (96.3 °F)  36.7 °C (98.1 °F)   TempSrc:    Temporal   SpO2: 97% 94%  95%   Weight:       Height:         No intake or output data in the 24 hours ending 02/27/24 1259    Wt Readings from Last 4 Encounters:   02/26/24 104 kg (230 lb 6.1 oz)   01/12/24 104 kg (230 lb)   10/19/23 104 kg (229 lb 4.5 oz)   08/30/23 104 kg (230 lb)       Physical Examination:     GENERAL APPEARANCE: Well developed, well nourished, in no acute distress.  CHEST: Symmetric and non-tender.  INTEGUMENT: Skin warm and dry, without gross excoriationis or lesions.  HEENT: No gross abnormalities of conjunctiva, teeth, gums, oral mucosa  NECK: Supple, no JVD, no bruit. Thyroid not palpable. Carotid upstrokes normal.  NEURO/PSHCY: Alert and oriented x3; appropriate behavior and responses and responses, grossly normal cerebellar function with normal balance and coordination  LUNGS: Clear slightly diminished  HEART: s1, s2  irregular 2/6 systolic  murmur  ABDOMEN: Soft, nontender, no palpable hepatosplenomegaly, no mases, no bruits. Abdominal aorta not noted to be enlarged.  MUSCULOSKELETAL: Ambulatory with normal tandem gait.  EXTREMITIES: Warm with good color, no clubbing or cyanois. 2 plus  edema  PERIPHERAL VASCULAR: Pulses present and equally palpable; + throughout. No femoral bruits.      Lab:     CBC:   Results from last 7 days   Lab Units 02/27/24  0752 02/26/24  1233   WBC AUTO x10*3/uL 2.6* 2.7*   RBC AUTO x10*6/uL 3.38* 3.31*   HEMOGLOBIN g/dL 11.7* 11.3*   HEMATOCRIT % 36.5 35.4*   MCV fL 108* 107*   MCH pg 34.6* 34.1*   MCHC g/dL 32.1 31.9*   RDW % 12.9 13.1   PLATELETS AUTO x10*3/uL 113* 105*     CMP:    Results from last 7 days   Lab Units 02/27/24  0752 02/26/24  1233   SODIUM mmol/L 145 145   POTASSIUM mmol/L 3.0* 2.7*   CHLORIDE mmol/L 110* 108*   CO2 mmol/L 27 26   BUN mg/dL 12 15   CREATININE  mg/dL 0.94 1.07*   GLUCOSE mg/dL 84 94   PROTEIN TOTAL g/dL  --  5.2*   CALCIUM mg/dL 7.9* 7.8*   BILIRUBIN TOTAL mg/dL  --  0.6   ALK PHOS U/L  --  81   AST U/L  --  16   ALT U/L  --  23     BMP:    Results from last 7 days   Lab Units 02/27/24  0752 02/26/24  1233   SODIUM mmol/L 145 145   POTASSIUM mmol/L 3.0* 2.7*   CHLORIDE mmol/L 110* 108*   CO2 mmol/L 27 26   BUN mg/dL 12 15   CREATININE mg/dL 0.94 1.07*   CALCIUM mg/dL 7.9* 7.8*   GLUCOSE mg/dL 84 94     Magnesium:  Results from last 7 days   Lab Units 02/27/24  0752 02/26/24  1233   MAGNESIUM mg/dL 1.60 1.29*     Troponin:    Results from last 7 days   Lab Units 02/26/24  1417 02/26/24  1233   TROPHS ng/L 17* 18*     BNP:   Results from last 7 days   Lab Units 02/26/24  1233   BNP pg/mL 401*         Diagnostic Studies:     ECG 12 lead  Result Date: 2/27/2024    Atrial fibrillation with slow ventricular response Low voltage QRS ST & T wave abnormality, consider anterior ischemia Abnormal ECG When compared with ECG of 11-MAY-2022 17:08, Atrial fibrillation has replaced Sinus rhythm Inverted T waves have replaced nonspecific T wave abnormality in Inferior leads T wave inversion more evident in Anterior leads See ED provider note for full interpretation and clinical correlation Confirmed by Soraya Schneider ([id number]) on 2/27/2024 9:34:42 AM    ECG 12 lead  Result Date: 2/27/2024    Atrial fibrillation RSR' or QR pattern in V1 suggests right ventricular conduction delay ST & T wave abnormality, consider inferior ischemia ST & T wave abnormality, consider anterior ischemia Abnormal ECG When compared with ECG of 26-FEB-2024 12:45, (unconfirmed) No significant change was found See ED provider note for full interpretation and clinical correlation Confirmed by Soraya Schneider ([id number]) on 2/27/2024 9:34:29 AM      Radiology:     CT angio chest for pulmonary embolism   Final Result   1. No pulmonary emboli.   2. Small to moderate right pleural effusion.   Signed by Ildefonso Oneal  MD      Vascular US lower extremity venous duplex bilateral   Final Result   Evidence for chronic nonocclusive DVT within the left femoral vein.     No evidence for DVT within the right lower extremity.     Bilateral calf veins not visualized due to edema.   Findings similar to prior ultrasound of 01/12/2024.   Findings discussed by telephone as a critical result with Dr. Konrad Lewis on 2/26/2024 at 1340 hours.   Signed by Anders Estrada MD      XR chest 1 view   Final Result   1. No acute pulmonary pathology.   2. Left port is unchanged.   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)    Essential hypertension    Hypothyroidism    Lymphedema of left lower extremity    Osteoporosis    Chronic venous stasis    Insomnia    Hemorrhoids    Obesity (BMI 30.0-34.9)    Depression, major, single episode, mild (CMS/HCC)    Periumbilical hernia    Stage 3a chronic kidney disease (CKD) (CMS/HCC)    Pre-op examination    Encounter for immunization    Ventral hernia, recurrent    Acute deep vein thrombosis (DVT) of femoral vein of left lower extremity (CMS/HCC)    Pain in right buttock    Hypokalemia    Hypomagnesemia    Generalized pain    On palliative antineoplastic chemotherapy    Anxiety and depression    Atrial fibrillation (CMS/HCC)       Assessment:   New onset atrial fibrillation  Acute on chronic diastolic heart failure NYHA class II  Pleural effusion  History of DVT on Eliquis  Morbid obesity  Hypokalemia  Hypomagnesia  Hypertension      Plan:    Tele  monitoring  Serial  enzymes  2d echo  Aspirin 81 daily  Continue Eliquis 5 mg twice daily  Lopressor 12.5 mg p.o. twice daily  Keep magnesium greater than 2.0  Lasix 20 mg IV push twice daily  Strict intake and output  Daily weights  Keep  potassium 4.0-4.5    Further recommendations per  Dr Erwin Inman CNP  [facility]    Of note, this documentation is completed using the Dragon Dictation system (voice recognition software). There may be spelling and/or grammatical errors that were not corrected prior to final submission.    Electronically signed by ARCADIO Stewart-CNP, on 2/27/2024 at 12:59 PM     I have personally interviewed and examined the patient.   I have personally and independently reviewed labs and diagnostic testing.  I have personally verified the elements of the history and physical listed above and changes, if any, are noted.   I have personally reviewed the assessment and plan as documented by JHONNY Estevez, CNP and concur.    In summary, Mrs. Woodward has a history of valvular heart disease with echocardiogram in September 2023 showing moderate aortic stenosis, moderate tricuspid regurgitation, and moderately elevated RVSP.  Normal LV systolic function.  No history of atherosclerotic heart disease.  Lexiscan myocardial perfusion study October 2, 2023 was negative for ischemia or infarction.  Calculated LV ejection fraction of 66%.  She was started on Eliquis last month for treatment of a left lower extremity DVT.  She was discharged on January 14, 2024.  She has a history of hypertension, lymphoma, and lymphedema.  She has been dealing with a persistent upper respiratory infection for the past 3 weeks or so.  She complains of cough, congestion, and fatigue.  She presented to the emergency department with complaints of some intermittent vague chest discomfort and palpitations.  She was noted to have underlying new onset atrial fibrillation.  Heart rates have been ranging in the 50s to 70s.  Blood pressure yesterday was 217/98 mmHg.  She was admitted to telemetry.  Cardiac rhythm is irregularly irregular.   II/VI ZANE RUSB. Lungs have decreased breath  sounds the bases.  2-3+ bilateral peripheral edema.  WBC 2.6 with hemoglobin 11.7.  BMP normal with exception of potassium 2.7.  Magnesium level was 1.29.  High-sensitivity troponin level 18 and 17.  BNP level 401.  Patient will be continued on Eliquis.  Start low-dose metoprolol to tartrate 12.5 mg twice daily.  Supplement potassium and magnesium.  Repeat echocardiogram will be reviewed.  I discussed the possibility of elective cardioversion with the patient and her daughter and a prefer to think about this prior to making any decision.  Further recommendations to follow.

## 2024-02-27 NOTE — PROGRESS NOTES
Met w/ pt and dtr.  Pt had personal needs for assistance. Spoke  then w/ dtr w/ pt permission.  Dtr is Nelda, a .  She moved her mother in with her several years ago. See flow sheet for assist. Confirmed demo's ins and pcp. Dtr transports. Prefers a home discharge. May need hhc.    Severely impaired: cannot locate objects without hearing or touching them or patient nonresponsive.

## 2024-02-27 NOTE — CONSULTS
"Nutrition Initial Assessment:   Nutrition Assessment    Reason for Assessment: Admission nursing screening    Patient is a 88 y.o. female presenting with: atrial fibrillation. Pt with hx lymphedema, HTN, obesity, lymphoma on active treatment per pt.       Nutrition History:  Food and Nutrient History: RDN consult for MST score of 2. Pt denies recent wt changes, states  lbs. Pt also denies recent changes in appetite. Pt reports she has been following a Keto diet for the past month but recently stopped as she was not seeing any wt changes. Pt deneis N/V/C/D at this time. Pt reports she has a plate in the back of her neck - unable to tilt head back to swallow large pills or finish a cup of liquid - needs to use straws with her drinks. Pt denies using ONS at home. Discussed change to low sodium diet to help reduce swelling - pt agreeable. Will continue to monitor.  Vitamin/Herbal Supplement Use: per pt report - Vitamin A, zinc, elderberry, B12, biotin, D3  Food Allergies/Intolerances:  None  GI Symptoms: None  Oral Problems: None       Anthropometrics:  Height: 175.3 cm (5' 9\")   Weight: 104 kg (230 lb 6.1 oz)   BMI (Calculated): 34.01  IBW/kg (Dietitian Calculated): 65.9 kg  Percent of IBW: 158 %       Weight History:   Wt Readings from Last 10 Encounters:   02/26/24 104 kg (230 lb 6.1 oz)   01/12/24 104 kg (230 lb)   10/19/23 104 kg (229 lb 4.5 oz)   08/30/23 104 kg (230 lb)   06/23/22 106 kg (233 lb)   04/19/22 106 kg (233 lb)   09/03/21 113 kg (249 lb 9 oz)   05/27/21 112 kg (247 lb 9.2 oz)   03/18/21 117 kg (258 lb 6.1 oz)   08/21/20 115 kg (252 lb 6.8 oz)      Weight Change %:  Weight History / % Weight Change: pt denies recent wt change    Nutrition Focused Physical Exam Findings:    Subcutaneous Fat Loss:   Orbital Fat Pads: Well nourshed (slightly bulging fat pads)  Buccal Fat Pads: Well nourished (full, rounded cheeks)  Muscle Wasting:  Temporalis: Well nourished (well-defined muscle)  Pectoralis " (Clavicular Region): Well nourished (clavicle not visible)  Deltoid/Trapezius: Well nourished (rounded appearance at arm, shoulder, neck)  Edema:  Edema: +2 mild  Edema Location: RLE, LLE  Physical Findings:  Skin: Negative    Nutrition Significant Labs:  CBC Trend:   Results from last 7 days   Lab Units 02/27/24  0752 02/26/24  1233   WBC AUTO x10*3/uL 2.6* 2.7*   RBC AUTO x10*6/uL 3.38* 3.31*   HEMOGLOBIN g/dL 11.7* 11.3*   HEMATOCRIT % 36.5 35.4*   MCV fL 108* 107*   PLATELETS AUTO x10*3/uL 113* 105*    , BMP Trend:   Results from last 7 days   Lab Units 02/27/24  0752 02/26/24  1233   GLUCOSE mg/dL 84 94   CALCIUM mg/dL 7.9* 7.8*   SODIUM mmol/L 145 145   POTASSIUM mmol/L 3.0* 2.7*   CO2 mmol/L 27 26   CHLORIDE mmol/L 110* 108*   BUN mg/dL 12 15   CREATININE mg/dL 0.94 1.07*    , Renal Lab Trend:   Results from last 7 days   Lab Units 02/27/24  0752 02/26/24  1233   POTASSIUM mmol/L 3.0* 2.7*   SODIUM mmol/L 145 145   MAGNESIUM mg/dL 1.60 1.29*   EGFR mL/min/1.73m*2 59* 50*   BUN mg/dL 12 15   CREATININE mg/dL 0.94 1.07*        Nutrition Specific Medications:  reviewed    I/O:    ;          Dietary Orders (From admission, onward)       Start     Ordered    02/26/24 2321  Adult diet Regular  Diet effective now        Question:  Diet type  Answer:  Regular    02/26/24 2320                     Estimated Needs:   Total Energy Estimated Needs (kCal): 2080 kCal  Method for Estimating Needs: 20 kcal/kg ABW  Total Protein Estimated Needs (g): 104 g  Method for Estimating Needs: 1 g/kg ABW  Total Fluid Estimated Needs (mL): 2080 mL  Method for Estimating Needs: 1 ml/kcal or per MD        Nutrition Diagnosis   Malnutrition Diagnosis  Patient has Malnutrition Diagnosis: No    Nutrition Diagnosis  Patient has Nutrition Diagnosis: No       Nutrition Interventions/Recommendations         Nutrition Prescription:  Individualized Nutrition Prescription Provided for : Low Sodium diet        Nutrition Interventions:    Interventions: Meals and snacks  Meals and Snacks: General healthful diet, Diets modified for specific foods or ingredients  Goal: Consumes 3 meals per day    Collaboration and Referral of Nutrition Care: Collaboration by nutrition professional with other providers  Goal: RN, secure chat to attending    Nutrition Education:   No needs at this time       Nutrition Monitoring and Evaluation   Food/Nutrient Related History Monitoring  Monitoring and Evaluation Plan: Energy intake, Amount of food, Fluid intake  Energy Intake: Estimated energy intake  Criteria: Pt meets >75% of estimated energy needs  Fluid Intake: Estimated fluid intake  Criteria: Meets >75% of estimated fluid needs  Amount of Food: Estimated amout of food  Criteria: Pt consumes >75% of meals    Body Composition/Growth/Weight History  Monitoring and Evaluation Plan: Weight  Weight: Measured weight  Criteria: Maintains stable weight    Biochemical Data, Medical Tests and Procedures  Monitoring and Evaluation Plan: Electrolyte/renal panel  Criteria: WNL            Time Spent/Follow-up Reminder:   Time Spent (min): 30 minutes  Last Date of Nutrition Visit: 02/27/24  Nutrition Follow-Up Needed?: 5-7 days  Follow up Comment: LS diet

## 2024-02-27 NOTE — H&P
"02/27/24       CHIEF COMPLAINT:      Chief Complaint   Patient presents with    Cough     \"Here for flu like symptoms with chest congestion and right leg redness and swelling.\"        PCP is Ned Estrada DO, cardiologist is Dr. Hsu    History is taken from the patient and her daughter who are good historians, discussion with the ER physician & ER records, [facility] outpatient medical records, [facility] medical records and records from [facility].  She is also known to me her admission in January when she was admitted with a LLE DVT.    Laura Woodward is a 88 y.o. 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    2 or 3 weeks ago the patient developed some sinus congestion, headache, cough, and rhinorrhea-she was treated with doxycycline, symptoms initially improved but then recurred about a week and a half ago.  Mucinex was added and another course of doxycycline.  She noted progressive swelling of her legs, both the left where she had her DVT but also in the right.  There was some erythema of the right lower extremity but no heat.  She is complaining because \"I cannot wear my shoes\", and her daughter has been unable to put her compression hose on due to the swelling.  She denied any palpitations or chest pains.  She is not on a diuretic.  Her daughter does have her on a keto diet to try and help her lose weight.  She is not on a PPI.    She presented to the ER last night due to right lower extremity edema which started the day before, was painful and woke her up throughout the night, concerned about a possible blood clot in that leg, even though she was on apixaban.  She was also complaining of a cough and some left anterior lateral rib pain.  In the ER her chemistry panel " showed hypokalemia with a potassium of 2.7, hypomagnesemia with a magnesium of 1.29 (was normal on 1/14/2024, electrolytes were otherwise normal.  BUN 15 with a creatinine of 1.07.  LFTs normal other than a slightly low total protein of 5.9.  BNP was elevated at 401, troponins were elevated but in a level pattern, 18/17.  TSH is 1.26.  CBC with a WBC of 2.7, H/H11.3/35.4, platelet count 105K.  Influenza A, RSV, and COVID-19 were negative.  CXR with no significant disease, CTA showed no pulmonary embolus, she had a small to moderate right pleural effusion.  EKGs showed atrial fibrillation with a controlled rate.  (New from last EKG in 5/2022).  Patient received potassium and magnesium replacement and was admitted for further workup and treatment.    This morning her potassium remains low at 3.0-will give more replacement, magnesium is up to 1.60 after receiving IV in the ER.  Prior echocardiogram from 9/28/2023-normal LVEF of 60%, impaired relaxation of LV diastolic filling, moderate concentric LVH, moderate TR, moderate AAS, moderately elevated pulmonary artery pressure with an RVSP of 40 mmHg.    ROS:   She denies chest pains or palpitations.  No dizziness, did have some episodes recently with her vision being blurry.  No fevers or chills.  She has had some mild occasional constipation but no diarrhea, no nausea or vomiting.  No difficulty swallowing.  Still with some mild sinus congestion, minimal cough.  Sputum was not discolored.  She has chronic urinary incontinence which is unchanged.  No fever/chills/night sweats.  She is presently not taking her Revlimid (is taken with cycles, presently during downtime).    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  -Chronic chemotherapy with Revlimid  -History of adeno CA of colon and a polyp on 2016  -CKD stage III  -DJD with knee replacements  -Female urinary incontinence with history of bladder prolapse  -Depression/anxiety  -Glaucoma    PAST SURGICAL HISTORY:   -Remote tonsillectomy age 7  -Appendectomy  -Inguinal hernia repair for strangulated hernia in   -Bilateral carpal tunnel syndrome repair  -Deviated nasal septoplasty  -Bilateral cataracts >30 years ago  -Remote D&C  -Bladder suspension  -Laparoscopic cholecystectomy in the   -Bilateral total knee replacements   -Placement of IVC filter postop for pulmonary emboli   -Colonoscopy 2008-redundant colon  -History of left knee arthroscopy ~  -Revision of failed left knee replacement 2012  -I&D of left knee abscess 2012  -C5-6, C6-7 anterior cervical discectomy and fusion 2012  -Revision of left total knee 5/15/2014  -CT-guided biopsy of abdominal mass 2015-diffuse large B-cell lymphoma  -Port placed 10/19/2015  -Colonoscopy 2016-polyp with adenocarcinoma, proctitis  -Laparoscopic to open mesenteric lymph node biopsy 2017 for recurrent DLBCL  -Colonoscopy 2017-resolved proctitis  -Colonoscopy 2022-healing anal fissure, diverticulosis, internal hemorrhoids  -Ventral hernia repair 10/19/2023    FAMILY HISTORY:   -Father- age 92, had black lung  -Mother- age 92, had HTN & glaucoma  -Siblings-3 sisters, all  in their late 80s, unknown causes  -Children-1 daughter A&W, 1 son with history of leukemia    SOCIAL HISTORY:   -Tobacco-quit smoking in  after <1 pack daily for 15 years  -Alcohol-denies  -Drugs-denied  -Marital-she is , she lives with her daughter  -Occupation-retired  "nurses aide    ASSESSMENT/PLAN:   -New onset atrial fibrillation, rate is controlled.  In the setting of low magnesium and potassium, both being replaced.  Echocardiogram ordered, cardiology consulted.  Patient is already on anticoagulation with apixaban due to her recent DVT.  -Acute CHF with edema, small right pleural effusion-probably due to A-fib, possibly also diastolic and due to her known pulmonary HTN and valvular heart disease.-will treat with IV furosemide, add spironolactone.  Echocardiogram pending.  -Hypokalemia-replacing, most likely is due to her keto diet as she had not been on a diuretic.  -Hypomagnesia-probably also due to the keto diet, replacing.  -Recent DVT-continuing her apixaban.  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  -History of mild aortic stenosis    PHYSICAL EXAM:   I&O:  No intake or output data in the 24 hours ending 02/27/24 0924    Vital Signs:  Blood pressure 172/77, pulse 73, temperature 36.7 °C (98.1 °F), temperature source Temporal, resp. rate 22, height 1.753 m (5' 9\"), weight 104 kg (230 lb 6.1 oz), SpO2 95 %.    Physical Exam:  -General appearance: Pleasant obese elderly white female, sitting up in a chair, daughter in the room with her.  She is alert, and in NAD.  -Vital signs:  As above  -HEENT: Pupils are reactive, extraocular movements intact.  Lids, conjunctiva, sclera are normal.  Mouth/pharynx unremarkable.  -Neck: Thick, no obvious JVD or adenopathy while sitting upright  -Chest: Lungs are clear to auscultation  -Cardiac: Irregularly irregular rhythm with a controlled rate  -Abdomen: Soft, bowel sounds active.  It is " nontender.  -Extremities: She has both pitting and nonpitting edema of both lower extremities, 2+ pitting on the right which is new from prior admission  -Skin: Minimal anterior right lower extremity erythema with no heat.  No evidence of infection.  -Neurologic:  Patient is alert and oriented x3.  Cranial nerves II through XII are intact.  -Behavior/Emotional:  Appropriate, cooperative    ALLERGIES:     Allergies   Allergen Reactions    Augmentin [Amoxicillin-Pot Clavulanate] Nausea/vomiting         Medications prior to admission:     Medications Prior to Admission   Medication Sig Dispense Refill Last Dose    acetaminophen (Tylenol) 325 mg tablet Take 2 tablets (650 mg) by mouth every 6 hours if needed.       amlodipine-olmesartan (Celia) 10-40 mg tablet TAKE 1 TABLET BY MOUTH ONCE  DAILY 90 tablet 1     apixaban (Eliquis) 5 mg tablet Take 2 tablets (10 mg) by mouth 2 times a day for 13 doses. 26 tablet 0     apixaban (Eliquis) 5 mg tablet Take 1 tablet (5 mg) by mouth 2 times a day. 60 tablet 0     aspirin (Beulah Low Dose Aspirin) 81 mg EC tablet Take 1 tablet (81 mg) by mouth once daily.       benzonatate (Tessalon) 100 mg capsule Take 1 capsule (100 mg) by mouth 3 times a day as needed for cough. Do not crush or chew. 42 capsule 0     celecoxib (CeleBREX) 200 mg capsule TAKE 2 CAPSULES BY MOUTH ONCE  DAILY (Patient taking differently: Take 1 capsule (200 mg) by mouth once daily.) 90 capsule 7     cloNIDine (Catapres-TTS) 0.3 mg/24 hr patch PLACE 1 PATCH ON THE SKIN ONCE  WEEKLY 4 patch 5     darifenacin (Enablex) 15 mg 24 hr tablet TAKE 1 TABLET BY MOUTH ONCE  DAILY 90 tablet 3     docusate sodium (Colace) 100 mg capsule Take 1 capsule (100 mg) by mouth 2 times a day.       doxycycline (Vibramycin) 100 mg capsule Take 1 capsule (100 mg) by mouth 2 times a day for 7 days. Take with at least 8 ounces (large glass) of water, do not lie down for 30 minutes after 14 capsule 0     estradiol (Estrace) 0.01 % (0.1  mg/gram) vaginal cream Insert 0.0025 Applicatorfuls (0.01 g) into the vagina once daily at bedtime. 3 x a week       guaiFENesin (Mucinex) 600 mg 12 hr tablet Take 1 tablet (600 mg) by mouth 2 times a day. Do not crush, chew, or split. 60 tablet 1     L.acidophilus-Bifido.longum (Probiotic Pearls Acidophilus) 1 billion cell capsule,delayed release(DR/EC) Take 1 capsule by mouth once daily. 30 capsule 1     latanoprost (Xalatan) 0.005 % ophthalmic solution Administer 1 drop into both eyes once daily at bedtime.       levothyroxine (Synthroid, Levoxyl) 50 mcg tablet TAKE 1 TABLET BY MOUTH ONCE  DAILY 90 tablet 1     omega-3 acid ethyl esters (Lovaza) 1 gram capsule Take 1 capsule (1 g) by mouth 2 times a day.       Revlimid 5 mg capsule Take 1 capsule (5 mg) by mouth once daily. As directed per oncology       terconazole (Terazol 7) 0.4 % vaginal cream Insert 1 applicator into the vagina once daily at bedtime. Every night for 7 days       timolol (Timoptic) 0.5 % ophthalmic solution Administer 1 drop into affected eye(s) every 12 hours.       traMADol (Ultram) 50 mg tablet Take 1 tablet (50 mg) by mouth every 8 hours if needed for moderate pain (4 - 6) for up to 20 doses. 10 tablet 0     trospium (Sanctura) 20 mg tablet Take 1 tablet (20 mg) by mouth every 12 hours.       valACYclovir (Valtrex) 500 mg tablet Take 1 tablet (500 mg) by mouth once daily. 1/2 tablet twice daily 3 times a week. M,W,F       vitamin E 180 mg (400 unit) capsule Take 1 capsule (400 Units) by mouth once daily.           Present Medications:  Scheduled medications   Medication Dose Route Frequency    amlodipine-olmesartan  1 tablet oral Daily    apixaban  5 mg oral BID    aspirin  81 mg oral Daily    [START ON 3/2/2024] cloNIDine  1 patch transdermal Weekly    estradiol  0.01 g vaginal Nightly    icosapent ethyL  2 g oral BID with meals    latanoprost  1 drop Both Eyes Nightly    lenalidomide  5 mg oral Daily    levothyroxine  50 mcg oral Daily     timolol  1 drop ophthalmic (eye) q12h    [START ON 2/28/2024] valACYclovir  250 mg oral Every Mon/Wed/Fri        PRN medications   Medication    acetaminophen    alum-mag hydroxide-simeth    magnesium hydroxide    melatonin    ondansetron         Labs:  Results for orders placed or performed during the hospital encounter of 02/26/24 (from the past 24 hour(s))   Lactate   Result Value Ref Range    Lactate 0.8 0.4 - 2.0 mmol/L   Protime-INR   Result Value Ref Range    Protime 23.8 (H) 9.8 - 12.8 seconds    INR 2.1 (H) 0.9 - 1.1   B-Type Natriuretic Peptide   Result Value Ref Range     (H) 0 - 99 pg/mL   Troponin I, High Sensitivity, Initial   Result Value Ref Range    Troponin I, High Sensitivity 18 (H) 0 - 13 ng/L   Magnesium   Result Value Ref Range    Magnesium 1.29 (L) 1.60 - 2.40 mg/dL   Sars-CoV-2 and Influenza A/B PCR   Result Value Ref Range    Flu A Result Not Detected Not Detected    Flu B Result Not Detected Not Detected    Coronavirus 2019, PCR Not Detected Not Detected   RSV PCR   Result Value Ref Range    RSV PCR Not Detected Not Detected   ECG 12 lead   Result Value Ref Range    Ventricular Rate 59 BPM    Atrial Rate 52 BPM    QRS Duration 100 ms    QT Interval 458 ms    QTC Calculation(Bazett) 453 ms    R Axis 28 degrees    T Axis -26 degrees    QRS Count 10 beats    Q Onset 217 ms    T Offset 446 ms    QTC Fredericia 455 ms   Troponin, High Sensitivity, 1 Hour   Result Value Ref Range    Troponin I, High Sensitivity 17 (H) 0 - 13 ng/L   TSH with reflex to Free T4 if abnormal   Result Value Ref Range    Thyroid Stimulating Hormone 1.26 0.44 - 3.98 mIU/L   ECG 12 lead   Result Value Ref Range    Ventricular Rate 61 BPM    Atrial Rate 70 BPM    QRS Duration 90 ms    QT Interval 464 ms    QTC Calculation(Bazett) 467 ms    R Axis 48 degrees    T Axis -50 degrees    QRS Count 10 beats    Q Onset 216 ms    T Offset 448 ms    QTC Fredericia 466 ms   Magnesium   Result Value Ref Range    Magnesium  1.60 1.60 - 2.40 mg/dL   CBC   Result Value Ref Range    WBC 2.6 (L) 4.4 - 11.3 x10*3/uL    nRBC 0.0 0.0 - 0.0 /100 WBCs    RBC 3.38 (L) 4.00 - 5.20 x10*6/uL    Hemoglobin 11.7 (L) 12.0 - 16.0 g/dL    Hematocrit 36.5 36.0 - 46.0 %     (H) 80 - 100 fL    MCH 34.6 (H) 26.0 - 34.0 pg    MCHC 32.1 32.0 - 36.0 g/dL    RDW 12.9 11.5 - 14.5 %    Platelets 113 (L) 150 - 450 x10*3/uL     CBC:   Results from last 7 days   Lab Units 02/27/24  0752 02/26/24  1233   WBC AUTO x10*3/uL 2.6* 2.7*   RBC AUTO x10*6/uL 3.38* 3.31*   HEMOGLOBIN g/dL 11.7* 11.3*   HEMATOCRIT % 36.5 35.4*   MCV fL 108* 107*   MCH pg 34.6* 34.1*   MCHC g/dL 32.1 31.9*   RDW % 12.9 13.1   PLATELETS AUTO x10*3/uL 113* 105*     CMP:    Results from last 7 days   Lab Units 02/27/24  0752 02/26/24  1233   SODIUM mmol/L 145 145   POTASSIUM mmol/L 3.0* 2.7*   CHLORIDE mmol/L 110* 108*   CO2 mmol/L 27 26   BUN mg/dL 12 15   CREATININE mg/dL 0.94 1.07*   GLUCOSE mg/dL 84 94   PROTEIN TOTAL g/dL  --  5.2*   CALCIUM mg/dL 7.9* 7.8*   BILIRUBIN TOTAL mg/dL  --  0.6   ALK PHOS U/L  --  81   AST U/L  --  16   ALT U/L  --  23       BNP:  @BNP@  Magnesium:    Results from last 7 days   Lab Units 02/27/24  0752 02/26/24  1233   MAGNESIUM mg/dL 1.60 1.29*     Troponin:    Results from last 7 days   Lab Units 02/26/24  1417 02/26/24  1233   TROPHS ng/L 17* 18*         Radiology:  CT angio chest for pulmonary embolism   Final Result   1. No pulmonary emboli.   2. Small to moderate right pleural effusion.   Signed by Ildeofnso Oneal MD      Vascular US lower extremity venous duplex bilateral   Final Result   Evidence for chronic nonocclusive DVT within the left femoral vein.     No evidence for DVT within the right lower extremity.     Bilateral calf veins not visualized due to edema.   Findings similar to prior ultrasound of 01/12/2024.   Findings discussed by telephone as a critical result with Dr. Konrad Lewis on 2/26/2024 at 1340 hours.   Signed by Anders Estrada,  MD      XR chest 1 view   Final Result   1. No acute pulmonary pathology.   2. Left port is unchanged.   Signed by Ildefonso Oneal MD      Transthoracic Echo (TTE) Complete    (Results Pending)    Echocardiogram results from 9/28/2023:  Pulmonary Artery: The tricuspid regurgitant velocity is 3.04 m/s, and with an estimated right atrial pressure of 3 mmHg, the estimated pulmonary artery pressure is moderately elevated with the RVSP at 40.0 mmHg.  Systemic Veins: The inferior vena cava appears to be of normal size.        CONCLUSIONS:  1. Left ventricular systolic function is normal with a 60% estimated ejection fraction.  2. Spectral Doppler shows an impaired relaxation pattern of left ventricular diastolic filling.  3. There is moderate concentric left ventricular hypertrophy.  4. There is no evidence of mitral valve stenosis.  5. Trace mitral valve regurgitation.  6. Moderate tricuspid regurgitation.  7. Moderate aortic valve stenosis.  8. The aortic valve appears tricuspid with restriction.  9. There is moderate aortic valve cusp calcification.  10. Moderately elevated pulmonary artery pressure.    Soraya Schneider MD      *Some of this note was completed using Dragon voice recognition technology and may include unintended errors with respect to translation of words, typographical errors or grammar errors which may not have been identified prior to finalization of the chart note.

## 2024-02-27 NOTE — PROGRESS NOTES
"Emergency Medicine Transition of Care Note.    I received Laura Woodward in signout from KAM Fong. Please see the previous ED provider note for all HPI, PE and MDM up to the time of signout at 1600. This is in addition to the primary record.    In brief Laura Woodward is an 88 y.o. female presenting for   Chief Complaint   Patient presents with    Cough     \"Here for flu like symptoms with chest congestion and right leg redness and swelling.\"     At the time of signout we were awaiting: CT Chest    Diagnoses as of 02/26/24 1901   Hypokalemia   Hypomagnesemia   Other hypervolemia   Atrial flutter, unspecified type (CMS/[facility])     Labs Reviewed   CBC WITH AUTO DIFFERENTIAL - Abnormal       Result Value    WBC 2.7 (*)     nRBC 0.0      RBC 3.31 (*)     Hemoglobin 11.3 (*)     Hematocrit 35.4 (*)      (*)     MCH 34.1 (*)     MCHC 31.9 (*)     RDW 13.1      Platelets 105 (*)     Neutrophils % 35.6      Immature Granulocytes %, Automated 0.4      Lymphocytes % 38.5      Monocytes % 15.9      Eosinophils % 8.5      Basophils % 1.1      Neutrophils Absolute 0.96 (*)     Immature Granulocytes Absolute, Automated 0.01      Lymphocytes Absolute 1.04      Monocytes Absolute 0.43      Eosinophils Absolute 0.23      Basophils Absolute 0.03     COMPREHENSIVE METABOLIC PANEL - Abnormal    Glucose 94      Sodium 145      Potassium 2.7 (*)     Chloride 108 (*)     Bicarbonate 26      Anion Gap 14      Urea Nitrogen 15      Creatinine 1.07 (*)     eGFR 50 (*)     Calcium 7.8 (*)     Albumin 3.5      Alkaline Phosphatase 81      Total Protein 5.2 (*)     AST 16      Bilirubin, Total 0.6      ALT 23     PROTIME-INR - Abnormal    Protime 23.8 (*)     INR 2.1 (*)    B-TYPE NATRIURETIC PEPTIDE - Abnormal     (*)     Narrative:        <100 pg/mL - Heart failure unlikely  100-299 pg/mL - Intermediate probability of acute heart                  failure exacerbation. Correlate with clinical                  context " and patient history.    >=300 pg/mL - Heart Failure likely. Correlate with clinical                  context and patient history.    BNP testing is performed using different testing methodology at [facility] than at other [facility] hospitals. Direct result comparisons should only be made within the same method.      SERIAL TROPONIN-INITIAL - Abnormal    Troponin I, High Sensitivity 18 (*)     Narrative:     Less than 99th percentile of normal range cutoff-  Female and children under 18 years old <14 ng/L; Male <21 ng/L: Negative  Repeat testing should be performed if clinically indicated.     Female and children under 18 years old 14-50 ng/L; Male 21-50 ng/L:  Consistent with possible cardiac damage and possible increased clinical   risk. Serial measurements may help to assess extent of myocardial damage.     >50 ng/L: Consistent with cardiac damage, increased clinical risk and  myocardial infarction. Serial measurements may help assess extent of   myocardial damage.      NOTE: Children less than 1 year old may have higher baseline troponin   levels and results should be interpreted in conjunction with the overall   clinical context.     NOTE: Troponin I testing is performed using a different   testing methodology at [facility] than at other   [facility] hospitals. Direct result comparisons should only   be made within the same method.   SERIAL TROPONIN, 1 HOUR - Abnormal    Troponin I, High Sensitivity 17 (*)     Narrative:     Less than 99th percentile of normal range cutoff-  Female and children under 18 years old <14 ng/L; Male <21 ng/L: Negative  Repeat testing should be performed if clinically indicated.     Female and children under 18 years old 14-50 ng/L; Male 21-50 ng/L:  Consistent with possible cardiac damage and possible increased clinical   risk. Serial measurements may help to assess extent of myocardial damage.     >50 ng/L: Consistent with cardiac damage, increased clinical risk  and  myocardial infarction. Serial measurements may help assess extent of   myocardial damage.      NOTE: Children less than 1 year old may have higher baseline troponin   levels and results should be interpreted in conjunction with the overall   clinical context.     NOTE: Troponin I testing is performed using a different   testing methodology at [facility] than at other   [facility]. Direct result comparisons should only   be made within the same method.   MAGNESIUM - Abnormal    Magnesium 1.29 (*)    LACTATE - Normal    Lactate 0.8      Narrative:     Venipuncture immediately after or during the administration of Metamizole may lead to falsely low results. Testing should be performed immediately  prior to Metamizole dosing.   SARS-COV-2 AND INFLUENZA A/B PCR - Normal    Flu A Result Not Detected      Flu B Result Not Detected      Coronavirus 2019, PCR Not Detected      Narrative:     This assay has received FDA Emergency Use Authorization (EUA) and  is only authorized for the duration of time that circumstances exist to justify the authorization of the emergency use of in vitro diagnostic tests for the detection of SARS-CoV-2 virus and/or diagnosis of COVID-19 infection under section 564(b)(1) of the Act, 21 U.S.C. 360bbb-3(b)(1). Testing for SARS-CoV-2 is only recommended for patients who meet current clinical and/or epidemiological criteria as defined by federal, state, or local public health directives. This assay is an in vitro diagnostic nucleic acid amplification test for the qualitative detection of SARS-CoV-2, Influenza A, and Influenza B from nasopharyngeal specimens and has been validated for use at [facility]. Negative results do not preclude COVID-19 infections or Influenza A/B infections, and should not be used as the sole basis for diagnosis, treatment, or other management decisions. If Influenza A/B and RSV PCR results are negative, testing for  Parainfluenza virus, Adenovirus and Metapneumovirus is routinely performed for [facility] pediatric oncology and intensive care inpatients, and is available on other patients by placing an add-on request.    RSV PCR - Normal    RSV PCR Not Detected      Narrative:     This assay is an FDA-cleared, in vitro diagnostic nucleic acid amplification test for the detection of RSV from nasopharyngeal specimens, and has been validated for use at [facility]. Negative results do not preclude RSV infections, and should not be used as the sole basis for diagnosis, treatment, or other management decisions. If Influenza A/B and RSV PCR results are negative, testing for Parainfluenza virus, Adenovirus and Metapneumovirus is routinely performed for pediatric oncology and intensive care inpatients at [facility], and is available on other patients by placing an add-on request.       TROPONIN SERIES- (INITIAL, 1 HR)    Narrative:     The following orders were created for panel order Troponin I Series, High Sensitivity (0, 1 HR).  Procedure                               Abnormality         Status                     ---------                               -----------         ------                     Troponin I, High Sensiti...[[id number]]  Abnormal            Final result               Troponin, High Sensitivi...[[id number]]  Abnormal            Final result                 Please view results for these tests on the individual orders.        CT angio chest for pulmonary embolism   Final Result   1. No pulmonary emboli.   2. Small to moderate right pleural effusion.   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     No evidence for DVT within the right lower extremity.     Bilateral calf veins not visualized due to edema.   Findings similar to prior ultrasound of 01/12/2024.   Findings discussed by  telephone as a critical result with Dr. Konrad Lewis on 2/26/2024 at 1340 hours.   Signed by Anders Estrada MD      XR chest 1 view   Final Result   1. No acute pulmonary pathology.   2. Left port is unchanged.   Signed by Ildefonso Oneal MD            Medical Decision Making      Final diagnoses:   [E87.6] Hypokalemia   [E83.42] Hypomagnesemia   [E87.79] Other hypervolemia   [I48.92] Atrial flutter, unspecified type (CMS/HCC)       Patient presented with worsening leg swelling, ultrasound imaging showed chronic DVT, no new acute findings.    EKG showed a flutter with variable block, heart rates in the 50s.  Concerned this may be exacerbating the patient's peripheral edema, causing heart failure.    Patient was found to have potassium of 2.7 which was repleted both orally and IV as well as magnesium of 1.29, also repleted.  Troponins elevated to 18 and 17.  CT chest also ordered, no evidence of pulmonary embolism, although small to moderate right-sided pleural effusion noted.    Ultimately patient require hospitalization for further cardiac testing.  Discussed with Dr. Schneider who accepted the patient for admission.    Procedure  Procedures    Nolvia Sales, APRN-CNP

## 2024-02-28 ENCOUNTER — APPOINTMENT (OUTPATIENT)
Dept: CARDIOLOGY | Facility: HOSPITAL | Age: 89
DRG: 308 | End: 2024-02-28
Payer: MEDICARE

## 2024-02-28 LAB
ANION GAP SERPL CALC-SCNC: 13 MMOL/L (ref 10–20)
AORTIC VALVE MEAN GRADIENT: 15 MMHG
AORTIC VALVE PEAK VELOCITY: 2.58 M/S
ATRIAL RATE: 63 BPM
AV PEAK GRADIENT: 26.6 MMHG
AVA (PEAK VEL): 0.91 CM2
AVA (VTI): 0.78 CM2
BUN SERPL-MCNC: 13 MG/DL (ref 6–23)
CALCIUM SERPL-MCNC: 7.9 MG/DL (ref 8.6–10.3)
CHLORIDE SERPL-SCNC: 109 MMOL/L (ref 98–107)
CO2 SERPL-SCNC: 27 MMOL/L (ref 21–32)
CREAT SERPL-MCNC: 1.03 MG/DL (ref 0.5–1.05)
EGFRCR SERPLBLD CKD-EPI 2021: 53 ML/MIN/1.73M*2
EJECTION FRACTION APICAL 4 CHAMBER: 58.6
EJECTION FRACTION: 57 %
ERYTHROCYTE [DISTWIDTH] IN BLOOD BY AUTOMATED COUNT: 13 % (ref 11.5–14.5)
GLUCOSE SERPL-MCNC: 90 MG/DL (ref 74–99)
HCT VFR BLD AUTO: 35.3 % (ref 36–46)
HGB BLD-MCNC: 11.3 G/DL (ref 12–16)
LEFT VENTRICLE INTERNAL DIMENSION DIASTOLE: 4.78 CM (ref 3.5–6)
LEFT VENTRICULAR OUTFLOW TRACT DIAMETER: 2 CM
MAGNESIUM SERPL-MCNC: 1.45 MG/DL (ref 1.6–2.4)
MCH RBC QN AUTO: 34.6 PG (ref 26–34)
MCHC RBC AUTO-ENTMCNC: 32 G/DL (ref 32–36)
MCV RBC AUTO: 108 FL (ref 80–100)
NRBC BLD-RTO: 0 /100 WBCS (ref 0–0)
PLATELET # BLD AUTO: 116 X10*3/UL (ref 150–450)
POTASSIUM SERPL-SCNC: 2.9 MMOL/L (ref 3.5–5.3)
Q ONSET: 219 MS
QRS COUNT: 10 BEATS
QRS DURATION: 98 MS
QT INTERVAL: 478 MS
QTC CALCULATION(BAZETT): 478 MS
QTC FREDERICIA: 478 MS
R AXIS: 34 DEGREES
RBC # BLD AUTO: 3.27 X10*6/UL (ref 4–5.2)
RIGHT VENTRICLE PEAK SYSTOLIC PRESSURE: 32.4 MMHG
SODIUM SERPL-SCNC: 146 MMOL/L (ref 136–145)
T AXIS: -8 DEGREES
T OFFSET: 458 MS
VENTRICULAR RATE: 60 BPM
WBC # BLD AUTO: 2.7 X10*3/UL (ref 4.4–11.3)

## 2024-02-28 PROCEDURE — 2500000004 HC RX 250 GENERAL PHARMACY W/ HCPCS (ALT 636 FOR OP/ED): Performed by: INTERNAL MEDICINE

## 2024-02-28 PROCEDURE — 93306 TTE W/DOPPLER COMPLETE: CPT | Performed by: INTERNAL MEDICINE

## 2024-02-28 PROCEDURE — 93010 ELECTROCARDIOGRAM REPORT: CPT | Performed by: INTERNAL MEDICINE

## 2024-02-28 PROCEDURE — 93306 TTE W/DOPPLER COMPLETE: CPT

## 2024-02-28 PROCEDURE — 99232 SBSQ HOSP IP/OBS MODERATE 35: CPT | Performed by: INTERNAL MEDICINE

## 2024-02-28 PROCEDURE — 85027 COMPLETE CBC AUTOMATED: CPT | Performed by: INTERNAL MEDICINE

## 2024-02-28 PROCEDURE — 93005 ELECTROCARDIOGRAM TRACING: CPT

## 2024-02-28 PROCEDURE — 99233 SBSQ HOSP IP/OBS HIGH 50: CPT | Performed by: INTERNAL MEDICINE

## 2024-02-28 PROCEDURE — 2500000002 HC RX 250 W HCPCS SELF ADMINISTERED DRUGS (ALT 637 FOR MEDICARE OP, ALT 636 FOR OP/ED): Performed by: INTERNAL MEDICINE

## 2024-02-28 PROCEDURE — 83735 ASSAY OF MAGNESIUM: CPT | Performed by: INTERNAL MEDICINE

## 2024-02-28 PROCEDURE — 80048 BASIC METABOLIC PNL TOTAL CA: CPT | Performed by: INTERNAL MEDICINE

## 2024-02-28 PROCEDURE — 2500000004 HC RX 250 GENERAL PHARMACY W/ HCPCS (ALT 636 FOR OP/ED): Performed by: NURSE PRACTITIONER

## 2024-02-28 PROCEDURE — 2500000001 HC RX 250 WO HCPCS SELF ADMINISTERED DRUGS (ALT 637 FOR MEDICARE OP): Performed by: INTERNAL MEDICINE

## 2024-02-28 PROCEDURE — 1200000002 HC GENERAL ROOM WITH TELEMETRY DAILY

## 2024-02-28 PROCEDURE — 2500000001 HC RX 250 WO HCPCS SELF ADMINISTERED DRUGS (ALT 637 FOR MEDICARE OP): Performed by: NURSE PRACTITIONER

## 2024-02-28 PROCEDURE — 2500000002 HC RX 250 W HCPCS SELF ADMINISTERED DRUGS (ALT 637 FOR MEDICARE OP, ALT 636 FOR OP/ED): Mod: MUE | Performed by: NURSE PRACTITIONER

## 2024-02-28 RX ORDER — POTASSIUM CHLORIDE 20 MEQ/1
40 TABLET, EXTENDED RELEASE ORAL EVERY 4 HOURS
Status: COMPLETED | OUTPATIENT
Start: 2024-02-28 | End: 2024-02-28

## 2024-02-28 RX ORDER — LANOLIN ALCOHOL/MO/W.PET/CERES
400 CREAM (GRAM) TOPICAL DAILY
Status: DISCONTINUED | OUTPATIENT
Start: 2024-02-28 | End: 2024-03-01

## 2024-02-28 RX ORDER — MAGNESIUM SULFATE HEPTAHYDRATE 40 MG/ML
4 INJECTION, SOLUTION INTRAVENOUS ONCE
Status: DISCONTINUED | OUTPATIENT
Start: 2024-02-28 | End: 2024-02-28

## 2024-02-28 RX ORDER — POTASSIUM CHLORIDE 20 MEQ/1
40 TABLET, EXTENDED RELEASE ORAL ONCE
Status: COMPLETED | OUTPATIENT
Start: 2024-02-28 | End: 2024-02-28

## 2024-02-28 RX ORDER — POTASSIUM CHLORIDE 14.9 MG/ML
20 INJECTION INTRAVENOUS ONCE
Status: COMPLETED | OUTPATIENT
Start: 2024-02-28 | End: 2024-02-28

## 2024-02-28 RX ORDER — MAGNESIUM SULFATE HEPTAHYDRATE 40 MG/ML
4 INJECTION, SOLUTION INTRAVENOUS ONCE
Status: COMPLETED | OUTPATIENT
Start: 2024-02-28 | End: 2024-02-28

## 2024-02-28 RX ORDER — SPIRONOLACTONE 25 MG/1
12.5 TABLET ORAL
Status: DISCONTINUED | OUTPATIENT
Start: 2024-02-28 | End: 2024-03-01

## 2024-02-28 RX ADMIN — PERFLUTREN 1.5 ML OF DILUTION: 6.52 INJECTION, SUSPENSION INTRAVENOUS at 08:21

## 2024-02-28 RX ADMIN — MAGNESIUM OXIDE 400 MG (241.3 MG MAGNESIUM) TABLET 400 MG: TABLET at 11:17

## 2024-02-28 RX ADMIN — FUROSEMIDE 20 MG: 10 INJECTION, SOLUTION INTRAMUSCULAR; INTRAVENOUS at 21:05

## 2024-02-28 RX ADMIN — LATANOPROST 1 DROP: 50 SOLUTION OPHTHALMIC at 19:38

## 2024-02-28 RX ADMIN — TIMOLOL MALEATE 1 DROP: 5 SOLUTION/ DROPS OPHTHALMIC at 09:50

## 2024-02-28 RX ADMIN — Medication 5 MG: at 21:05

## 2024-02-28 RX ADMIN — FUROSEMIDE 20 MG: 10 INJECTION, SOLUTION INTRAMUSCULAR; INTRAVENOUS at 09:38

## 2024-02-28 RX ADMIN — VALACYCLOVIR HYDROCHLORIDE 250 MG: 500 TABLET, FILM COATED ORAL at 09:39

## 2024-02-28 RX ADMIN — POTASSIUM CHLORIDE 40 MEQ: 1500 TABLET, EXTENDED RELEASE ORAL at 18:11

## 2024-02-28 RX ADMIN — SPIRONOLACTONE 12.5 MG: 25 TABLET ORAL at 12:32

## 2024-02-28 RX ADMIN — AMLODIPINE AND OLMESARTAN MEDOXOMIL 1 TABLET: 10; 40 TABLET ORAL at 12:33

## 2024-02-28 RX ADMIN — METOPROLOL TARTRATE 12.5 MG: 25 TABLET, FILM COATED ORAL at 09:38

## 2024-02-28 RX ADMIN — ICOSAPENT ETHYL 2 G: 1 CAPSULE ORAL at 17:12

## 2024-02-28 RX ADMIN — APIXABAN 5 MG: 5 TABLET, FILM COATED ORAL at 09:39

## 2024-02-28 RX ADMIN — METOPROLOL TARTRATE 12.5 MG: 25 TABLET, FILM COATED ORAL at 21:05

## 2024-02-28 RX ADMIN — ASPIRIN 81 MG: 81 TABLET, COATED ORAL at 09:39

## 2024-02-28 RX ADMIN — POTASSIUM CHLORIDE 40 MEQ: 1500 TABLET, EXTENDED RELEASE ORAL at 10:39

## 2024-02-28 RX ADMIN — MAGNESIUM SULFATE HEPTAHYDRATE 4 G: 40 INJECTION, SOLUTION INTRAVENOUS at 10:38

## 2024-02-28 RX ADMIN — LEVOTHYROXINE SODIUM 50 MCG: 50 TABLET ORAL at 06:04

## 2024-02-28 RX ADMIN — ICOSAPENT ETHYL 2 G: 1 CAPSULE ORAL at 09:39

## 2024-02-28 RX ADMIN — TIMOLOL MALEATE 1 DROP: 5 SOLUTION/ DROPS OPHTHALMIC at 19:39

## 2024-02-28 RX ADMIN — POTASSIUM CHLORIDE 40 MEQ: 1500 TABLET, EXTENDED RELEASE ORAL at 11:17

## 2024-02-28 RX ADMIN — POTASSIUM CHLORIDE 40 MEQ: 1500 TABLET, EXTENDED RELEASE ORAL at 14:45

## 2024-02-28 RX ADMIN — APIXABAN 5 MG: 5 TABLET, FILM COATED ORAL at 21:04

## 2024-02-28 RX ADMIN — POTASSIUM CHLORIDE 20 MEQ: 14.9 INJECTION, SOLUTION INTRAVENOUS at 14:40

## 2024-02-28 ASSESSMENT — COGNITIVE AND FUNCTIONAL STATUS - GENERAL
PERSONAL GROOMING: A LITTLE
DRESSING REGULAR UPPER BODY CLOTHING: A LITTLE
WALKING IN HOSPITAL ROOM: A LITTLE
EATING MEALS: A LITTLE
MOBILITY SCORE: 17
WALKING IN HOSPITAL ROOM: A LOT
STANDING UP FROM CHAIR USING ARMS: A LITTLE
TOILETING: A LOT
STANDING UP FROM CHAIR USING ARMS: A LITTLE
HELP NEEDED FOR BATHING: A LITTLE
DRESSING REGULAR LOWER BODY CLOTHING: A LITTLE
MOVING TO AND FROM BED TO CHAIR: A LITTLE
PERSONAL GROOMING: A LITTLE
MOVING TO AND FROM BED TO CHAIR: A LITTLE
DAILY ACTIVITIY SCORE: 17
TURNING FROM BACK TO SIDE WHILE IN FLAT BAD: A LITTLE
HELP NEEDED FOR BATHING: A LITTLE
MOBILITY SCORE: 18
DRESSING REGULAR UPPER BODY CLOTHING: A LITTLE
DAILY ACTIVITIY SCORE: 19
TURNING FROM BACK TO SIDE WHILE IN FLAT BAD: A LITTLE
CLIMB 3 TO 5 STEPS WITH RAILING: A LOT
DRESSING REGULAR LOWER BODY CLOTHING: A LITTLE
TOILETING: A LITTLE
CLIMB 3 TO 5 STEPS WITH RAILING: A LOT

## 2024-02-28 ASSESSMENT — PAIN SCALES - GENERAL
PAINLEVEL_OUTOF10: 4
PAINLEVEL_OUTOF10: 5 - MODERATE PAIN

## 2024-02-28 NOTE — PROGRESS NOTES
Laura Woodward is a 88 y.o. female on day 1 of admission presenting with Atrial fibrillation (CMS/[facility]).      ASSESSMENT/PLAN   Principal Problem:  -New onset atrial fibrillation, rate is controlled.  In the setting of low magnesium and potassium, both continuing to be replaced.  Echocardiogram.  Patient is already on anticoagulation with apixaban due to her recent DVT.  Active Problems:  -Hypokalemia-replacing, most likely is due to her keto diet, requiring large amounts of replacement, still low this morning.  -Hypomagnesia-probably also due to the keto diet, still low in spite of replacement yesterday, replacing IV and orally today.  -Acute CHF with edema, small right pleural effusion-probably due to A-fib, possibly also diastolic and due to her known pulmonary HTN and valvular heart disease.-On IV furosemide, added spironolactone.  Echocardiogram results pending.  -Recent DVT-continuing her apixaban.    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  -History of mild aortic stenosis    SUBJECTIVE/OBJECTIVE   02/28/24   -Patient says she is feeling good, no significant shortness of breath.  Her daughter is in the room with her, has many questions/concerns which we discussed.  -Daughter has questions about whether the clot will go away or not-I discussed with her the natural history of blood clots, noting that they sometimes will dissolve on their own, other times will still be present but will adhere to the wall of the vessel so not break off and go to the lungs.  -She is alert, in NAD.  -She is afebrile, vital signs stable.  Satting 95% on room air.  -On telemetry she remains  in atrial fibrillation with a controlled rate  -I&O-she is -910 cc since admission  -Some basilar crackles bilaterally  -Irregularly irregular with a controlled rate  -No significant change in her right lower extremity edema.  -Chemistry panel shows she is still hypokalemic with a potassium of 2.9-will give IV and oral replacement, start spironolactone.  -BUN 13 with a creatinine of 1.03.  -CBC with a WBC of 2.7, H/H stable at 11.3/35.3.  Platelet count remains low but stable at 116 K  -Echocardiogram just done this morning-patient refused to do it yesterday because she was going to miss her breakfast!  Was complaining again to the echo tech that she would miss her breakfast this morning!  -Cardiology consult appreciated-Dr. Hood spoke to the daughter a possible DCCV.  I discussed at length with both the daughter and the patient what that would entail, the importance of doing it get sooner rather than later to try and keep her in NSR, etc.  We also discussed at length her profound potassium deficit, most likely from the diet, how it can be replaced, etc.    *These notes are being done using Dragon voice recognition technology and may include unintended errors with respect to translation of words, typographical errors or grammar errors which may not have been identified prior to finalization of the chart note.    CURRENT MEDICATIONS     Scheduled Medications:    Current Facility-Administered Medications:     acetaminophen (Tylenol) tablet 650 mg, 650 mg, oral, q4h PRN, Soraya Schneider MD, 650 mg at 02/27/24 2118    alum-mag hydroxide-simeth (Mylanta) 200-200-20 mg/5 mL oral suspension 30 mL, 30 mL, oral, 4x daily PRN, Soraya Schneider MD    amlodipine-olmesartan (Celia) 10-40 mg per tablet 1 tablet, 1 tablet, oral, Daily, Soraya Schneider MD    apixaban (Eliquis) tablet 5 mg, 5 mg, oral, BID, Soraya Schneider MD, 5 mg at 02/27/24 2118    aspirin EC tablet 81 mg, 81 mg, oral, Daily, Soraya Schneider MD, 81 mg at 02/27/24 0848    [START ON  "3/2/2024] cloNIDine (Catapres-TTS) 0.3 mg/24 hr patch 1 patch, 1 patch, transdermal, Weekly, Soraya Schneider MD    estradiol (Estrace) 0.01 % (0.1 mg/gram) vaginal cream 0.01 g, 0.01 g, vaginal, Nightly, Soraya Schneider MD    furosemide (Lasix) injection 20 mg, 20 mg, intravenous, BID, Soraya Schneider MD, 20 mg at 02/27/24 1317    icosapent ethyL (Vascepa) capsule 2 g, 2 g, oral, BID with meals, Soraya Schneider MD, 2 g at 02/27/24 1725    latanoprost (Xalatan) 0.005 % ophthalmic solution 1 drop, 1 drop, Both Eyes, Nightly, Soraya Schneider MD, 1 drop at 02/27/24 2118    lenalidomide (Revlimid) capsule 5 mg, 5 mg, oral, Daily, Soraya Schneider MD    levothyroxine (Synthroid, Levoxyl) tablet 50 mcg, 50 mcg, oral, Daily, Soraya Schneider MD, 50 mcg at 02/28/24 0604    magnesium hydroxide (Milk of Magnesia) 2,400 mg/10 mL suspension 10 mL, 10 mL, oral, Daily PRN, Soraya Schneider MD    melatonin tablet 5 mg, 5 mg, oral, Nightly PRN, Soraya Schneider MD, 5 mg at 02/27/24 2118    metoprolol tartrate (Lopressor) tablet 12.5 mg, 12.5 mg, oral, BID, ARCADIO Stewart-CNP, 12.5 mg at 02/27/24 2117    ondansetron (Zofran) injection 4 mg, 4 mg, intravenous, q4h PRN, Soraya Schneider MD    timolol (Timoptic) 0.5 % ophthalmic solution 1 drop, 1 drop, ophthalmic (eye), q12h, Soraya Schneider MD, 1 drop at 02/27/24 1051    valACYclovir (Valtrex) tablet 250 mg, 250 mg, oral, Every Mon/Wed/Fri, Soraya Schneider MD     PRN Medications:  PRN medications   Medication    acetaminophen    alum-mag hydroxide-simeth    magnesium hydroxide    melatonin    ondansetron         IVs:        I&Os     Intake/Output last 3 Shifts:  I/O last 3 completed shifts:  In: 390 (3.7 mL/kg) [P.O.:340; I.V.:50 (0.5 mL/kg)]  Out: 1300 (12.4 mL/kg) [Urine:1300 (0.3 mL/kg/hr)]  Weight: 104.5 kg     VITAL SIGNS     Blood pressure 171/86, pulse 61, temperature 36.5 °C (97.7 °F), resp. rate 16, height 1.753 m (5' 9\"), weight 104 kg (230 lb 6.1 oz), SpO2 95 %.     PHYSICAL EXAM   Physical exam:  -General " "appearance: Pleasant elderly white female, sitting up in a chair, alert and in NAD.  Daughter is in the room with her.  -Vital signs:  As above  -HEENT: No icterus  -Neck: No adenopathy  -Chest: Decreased basilar crackles, no wheezes  -Cardiac: Irregularly irregular with a controlled rate  -Abdomen: Bowel sounds active  -Extremities: No significant improvement in her edema  -Skin: No rash  -Neurologic:   Patient is alert and oriented x3.   -Behavior/Emotional:  Appropriate, cooperative    LABS   Relevant Results:  Results from last 7 days   Lab Units 02/28/24  0540 02/27/24  0752 02/26/24  1233   GLUCOSE mg/dL 90 84 94      HbA1c:  No results found for: \"HGBA1C\"  CBC:   Results from last 7 days   Lab Units 02/28/24  0542 02/27/24  0752 02/26/24  1233   WBC AUTO x10*3/uL 2.7* 2.6* 2.7*   RBC AUTO x10*6/uL 3.27* 3.38* 3.31*   HEMOGLOBIN g/dL 11.3* 11.7* 11.3*   HEMATOCRIT % 35.3* 36.5 35.4*   MCV fL 108* 108* 107*   MCH pg 34.6* 34.6* 34.1*   MCHC g/dL 32.0 32.1 31.9*   RDW % 13.0 12.9 13.1   PLATELETS AUTO x10*3/uL 116* 113* 105*       CMP:    Results from last 7 days   Lab Units 02/28/24  0540 02/27/24  0752 02/26/24  1233   SODIUM mmol/L 146* 145 145   POTASSIUM mmol/L 2.9* 3.0* 2.7*   CHLORIDE mmol/L 109* 110* 108*   CO2 mmol/L 27 27 26   BUN mg/dL 13 12 15   CREATININE mg/dL 1.03 0.94 1.07*   GLUCOSE mg/dL 90 84 94   PROTEIN TOTAL g/dL  --   --  5.2*   CALCIUM mg/dL 7.9* 7.9* 7.8*   BILIRUBIN TOTAL mg/dL  --   --  0.6   ALK PHOS U/L  --   --  81   AST U/L  --   --  16   ALT U/L  --   --  23     Magnesium:  Results from last 7 days   Lab Units 02/28/24  0540 02/27/24  0752 02/26/24  1233   MAGNESIUM mg/dL 1.45* 1.60 1.29*     Troponin:    Results from last 7 days   Lab Units 02/26/24  1417 02/26/24  1233   TROPHS ng/L 17* 18*     INR:  @LABRCNT(INR:3)@  BNP:   Results from last 7 days   Lab Units 02/26/24  1233   BNP pg/mL 401*     Lipid Panel:  @lastlipid@   Cultures:  No results found for the last 90 " days.      IMAGING     CT angio chest for pulmonary embolism   Final Result   1. No pulmonary emboli.   2. Small to moderate right pleural effusion.   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     No evidence for DVT within the right lower extremity.     Bilateral calf veins not visualized due to edema.   Findings similar to prior ultrasound of 01/12/2024.   Findings discussed by telephone as a critical result with Dr. Konrad Lewis on 2/26/2024 at 1340 hours.   Signed by Anders Estrada MD      XR chest 1 view   Final Result   1. No acute pulmonary pathology.   2. Left port is unchanged.   Signed by Ildefonso Oneal MD      Transthoracic Echo (TTE) Complete    (Results Pending)      I spent 65 minutes in the professional and overall care of this patient.    Soraya Schneider MD

## 2024-02-28 NOTE — PROGRESS NOTES
[facility] Heart Progress Note           Rounding STANLEY/Cardiologist:  Jose Inman, APRN-CNP, Dr. Lj Hood  Primary Cardiologist: Dr. Danny Hsu    Date:  2/28/2024  Patient:  Laura Woodward  YOB: 1935  MRN:  [id number]   Admit Date:  2/26/2024      SUBJECTIVE:    2/28/24  Patient is awake and alert and oriented she sitting at the bedside commode she states that she feels a little bit better this morning  Denies chest pain does get some shortness of breath with exertion  EKG's A-fib no acute changes  Telemetry's A-fib 50s to 70s occasional PVC    2/27/24  Laura Woodward is a 88 y.o.  female patient who is being at the request of Dr. Schneider for inpatient consultation of CHF. She was admitted on 2/26/2024.  Previous [facility] and [facility] records have been reviewed in detail.    Patient with a history of DVT, hypertension, morbid obesity came into the emergency department complaining of cough and congestion and fatigue some palpitations been going on and off for the past 1 month.  Her daughter is at the bedside spoke to her at length she states that she had seen Dr. Hsu back in October she had a hernia when seen for preop clearance she had been doing very well.  She comes in found to be in new onset atrial fibrillation so they kindly asked cardiology to get involved with her case.  She denies fever, chills, PND, orthopnea, claudications.  Positive for shortness of breath with exertion, peripheral edema, palpitations  EKG is A-fib no acute changes  Troponin 17     Cardiac  history  9/28/23  CONCLUSIONS:  1. Left ventricular systolic function is normal with a 60% estimated ejection fraction.  2. Spectral Doppler shows an impaired relaxation pattern of left ventricular diastolic filling.  3. There is moderate concentric left ventricular hypertrophy.  4. There is no evidence of mitral valve stenosis.  5. Trace mitral valve regurgitation.  6. Moderate tricuspid regurgitation.  7.  Moderate aortic valve stenosis.  8. The aortic valve appears tricuspid with restriction.  9. There is moderate aortic valve cusp calcification.  10. Moderately elevated pulmonary artery pressure.     10/2/23  IMPRESSION:  Normal Lexiscan Myoview cardiac perfusion stress test.  No evidence of ischemia or myocardial infarction by perfusion imaging.  Normal left ventricular systolic function, ejection fraction 66%.  Noninvasive risk stratification: Low risk.  No previous available for comparison.      VITALS:     Vitals:    02/27/24 1924 02/27/24 2111 02/28/24 0004 02/28/24 0720   BP: 138/78 137/73 126/72 171/86   BP Location:  Left arm     Patient Position:  Lying  Sitting   Pulse: 67 64 60 61   Resp: 14  16 16   Temp: 36 °C (96.8 °F)  36.6 °C (97.9 °F) 36.5 °C (97.7 °F)   TempSrc:       SpO2: 96%  93% 95%   Weight:       Height:           Intake/Output Summary (Last 24 hours) at 2/28/2024 1017  Last data filed at 2/28/2024 0000  Gross per 24 hour   Intake 390 ml   Output 1300 ml   Net -910 ml       Wt Readings from Last 4 Encounters:   02/26/24 104 kg (230 lb 6.1 oz)   01/12/24 104 kg (230 lb)   10/19/23 104 kg (229 lb 4.5 oz)   08/30/23 104 kg (230 lb)       CURRENT HOSPITAL MEDICATIONS:   amlodipine-olmesartan, 1 tablet, oral, Daily  apixaban, 5 mg, oral, BID  aspirin, 81 mg, oral, Daily  [START ON 3/2/2024] cloNIDine, 1 patch, transdermal, Weekly  estradiol, 0.01 g, vaginal, Nightly  furosemide, 20 mg, intravenous, BID  icosapent ethyL, 2 g, oral, BID with meals  latanoprost, 1 drop, Both Eyes, Nightly  lenalidomide, 5 mg, oral, Daily  levothyroxine, 50 mcg, oral, Daily  magnesium sulfate, 4 g, intravenous, Once  metoprolol tartrate, 12.5 mg, oral, BID  potassium chloride CR, 40 mEq, oral, q4h  timolol, 1 drop, ophthalmic (eye), q12h  valACYclovir, 250 mg, oral, Every Mon/Wed/Fri         Current Outpatient Medications   Medication Instructions    acetaminophen (TYLENOL) 650 mg, oral, Every 6 hours PRN     amlodipine-olmesartan (Celia) 10-40 mg tablet 1 tablet, oral, Daily RT    apixaban (ELIQUIS) 10 mg, oral, 2 times daily    apixaban (ELIQUIS) 5 mg, oral, 2 times daily    aspirin (NELDA LOW DOSE ASPIRIN) 81 mg, oral, Daily    benzonatate (TESSALON) 100 mg, oral, 3 times daily PRN, Do not crush or chew.    celecoxib (CELEBREX) 400 mg, oral, Daily    cloNIDine (Catapres-TTS) 0.3 mg/24 hr patch PLACE 1 PATCH ON THE SKIN ONCE  WEEKLY    darifenacin (ENABLEX) 15 mg, oral, Daily    docusate sodium (COLACE) 100 mg, oral, 2 times daily    doxycycline (VIBRAMYCIN) 100 mg, oral, 2 times daily, Take with at least 8 ounces (large glass) of water, do not lie down for 30 minutes after    estradiol (ESTRACE) 0.01 g, vaginal, Nightly, 3 x a week    guaiFENesin (MUCINEX) 600 mg, oral, 2 times daily, Do not crush, chew, or split.    L.acidophilus-Bifido.longum (Probiotic Pearls Acidophilus) 1 billion cell capsule,delayed release(DR/EC) 1 capsule, oral, Daily    latanoprost (Xalatan) 0.005 % ophthalmic solution 1 drop, Both Eyes, Nightly    levothyroxine (SYNTHROID, LEVOXYL) 50 mcg, oral, Daily    omega-3 acid ethyl esters (LOVAZA) 1 g, oral, 2 times daily    Revlimid 5 mg, oral, Daily, As directed per oncology    terconazole (Terazol 7) 0.4 % vaginal cream 1 applicator, vaginal, Nightly, Every night for 7 days    timolol (Timoptic) 0.5 % ophthalmic solution 1 drop, ophthalmic (eye), Every 12 hours    traMADol (ULTRAM) 50 mg, oral, Every 8 hours PRN    trospium (SANCTURA) 20 mg, oral, Every 12 hours    valACYclovir (VALTREX) 500 mg, oral, Daily, 1/2 tablet twice daily 3 times a week. M,W,F     vitamin E 400 Units, oral, Daily        PHYSICAL EXAMINATION:   GENERAL:  Well developed, well nourished, in no acute distress.  CHEST:  Symmetric and nontender.  NEURO/PSYCH:  Alert and oriented times three with approppriate behavior and responses.  NECK:  Supple, no JVD, no bruit.  LUNGS:  Clear to auscultation bilaterally, normal respiratory  effort.  HEART:  s1,  s2 irregular       There are no rubs, clicks or heaves.  EXTREMITIES:  1  plus  pulses. 1 plus edema      LAB DATA:     CBC:   Results from last 7 days   Lab Units 02/28/24  0542 02/27/24  0752 02/26/24  1233   WBC AUTO x10*3/uL 2.7* 2.6* 2.7*   RBC AUTO x10*6/uL 3.27* 3.38* 3.31*   HEMOGLOBIN g/dL 11.3* 11.7* 11.3*   HEMATOCRIT % 35.3* 36.5 35.4*   MCV fL 108* 108* 107*   MCH pg 34.6* 34.6* 34.1*   MCHC g/dL 32.0 32.1 31.9*   RDW % 13.0 12.9 13.1   PLATELETS AUTO x10*3/uL 116* 113* 105*     CMP:    Results from last 7 days   Lab Units 02/28/24  0540 02/27/24  0752 02/26/24  1233   SODIUM mmol/L 146* 145 145   POTASSIUM mmol/L 2.9* 3.0* 2.7*   CHLORIDE mmol/L 109* 110* 108*   CO2 mmol/L 27 27 26   BUN mg/dL 13 12 15   CREATININE mg/dL 1.03 0.94 1.07*   GLUCOSE mg/dL 90 84 94   PROTEIN TOTAL g/dL  --   --  5.2*   CALCIUM mg/dL 7.9* 7.9* 7.8*   BILIRUBIN TOTAL mg/dL  --   --  0.6   ALK PHOS U/L  --   --  81   AST U/L  --   --  16   ALT U/L  --   --  23     BMP:    Results from last 7 days   Lab Units 02/28/24  0540 02/27/24  0752 02/26/24  1233   SODIUM mmol/L 146* 145 145   POTASSIUM mmol/L 2.9* 3.0* 2.7*   CHLORIDE mmol/L 109* 110* 108*   CO2 mmol/L 27 27 26   BUN mg/dL 13 12 15   CREATININE mg/dL 1.03 0.94 1.07*   CALCIUM mg/dL 7.9* 7.9* 7.8*   GLUCOSE mg/dL 90 84 94     Magnesium:  Results from last 7 days   Lab Units 02/28/24  0540 02/27/24  0752 02/26/24  1233   MAGNESIUM mg/dL 1.45* 1.60 1.29*     Troponin:    Results from last 7 days   Lab Units 02/26/24  1417 02/26/24  1233   TROPHS ng/L 17* 18*     BNP:   Results from last 7 days   Lab Units 02/26/24  1233   BNP pg/mL 401*       DIAGNOSTIC TESTING:     ECG 12 lead  Result Date: 2/27/2024    Atrial fibrillation with slow ventricular response Low voltage QRS ST & T wave abnormality, consider anterior ischemia Abnormal ECG When compared with ECG of 11-MAY-2022 17:08, Atrial fibrillation has replaced Sinus rhythm Inverted T waves have  replaced nonspecific T wave abnormality in Inferior leads T wave inversion more evident in Anterior leads See ED provider note for full interpretation and clinical correlation Confirmed by Soraya Schneider ([id number]) on 2/27/2024 9:34:42 AM      RADIOLOGY:     Transthoracic Echo (TTE) Complete         CT angio chest for pulmonary embolism   Final Result   1. No pulmonary emboli.   2. Small to moderate right pleural effusion.   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     No evidence for DVT within the right lower extremity.     Bilateral calf veins not visualized due to edema.   Findings similar to prior ultrasound of 01/12/2024.   Findings discussed by telephone as a critical result with Dr. Konrad Lewis on 2/26/2024 at 1340 hours.   Signed by Anders Estrada MD      XR chest 1 view   Final Result   1. No acute pulmonary pathology.   2. Left port is unchanged.   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)    Essential hypertension    Hypothyroidism    Lymphedema of left lower extremity    Osteoporosis    Chronic venous stasis    Insomnia    Hemorrhoids    Obesity (BMI 30.0-34.9)    Depression, major, single episode, mild (CMS/HCC)    Periumbilical hernia    Stage 3a chronic kidney disease (CKD) (CMS/HCC)    Pre-op examination    Encounter for immunization    Ventral hernia, recurrent    Acute deep vein thrombosis (DVT) of femoral vein of left lower extremity (CMS/HCC)    Pain in right buttock    Hypokalemia    Hypomagnesemia    Generalized pain    On palliative antineoplastic chemotherapy    Anxiety and depression    Atrial fibrillation (CMS/HCC)    Atrial flutter, unspecified type (CMS/HCC)       ASSESSMENT:   New onset atrial  fibrillation  Acute on chronic diastolic heart failure NYHA class II  Pleural effusion  History of DVT on Eliquis  Morbid obesity  Hypokalemia  Hypomagnesia  Hypertension      PLAN:    Tele  monitoring  Check echo  Continue the oral anticoagulation Eliquis 5 mg p.o. twice daily  Potassium 40 mEq every 4 hours x 3 doses  Magnesium 4 g IVPBx 1 recheck level in a.m.  Lasix 20 mg IV push every 12  Daily weights  Strict intake and output  Lopressor 12.5 mg p.o. twice daily    Further recommendations per Dr Erwin Inman CNP  [facility]    Of note, this documentation is completed using the Dragon Dictation system (voice recognition software). There may be spelling and/or grammatical errors that were not corrected prior to final submission.    Please do not hesitate to call with questions.  Electronically signed by ARCADIO Stewart-CNP, on 2/28/2024 at 10:17 AM     I have personally interviewed and examined the patient.   I have personally and independently reviewed labs and diagnostic testing.  I have personally verified the elements of the history and physical listed above and changes, if any, are noted.   I have personally reviewed the assessment and plan as documented by JHONNY Estevez, CNP and concur.    She looks and feels fine.  She denies chest pain or shortness of breath.  Vital signs are stable.  Heart rates are in the 60s.  Remains in atrial fibrillation.  Approximate 1 L negative fluid balance yesterday.  Cardiac rhythm is irregularly irregular.  Lungs are clear.  Moderate bilateral peripheral edema.  Hemoglobin is 11.3.  Sodium is 146 with potassium 2.9.  Normal BUN and creatinine.  Magnesium is 1.45.  Potassium and magnesium are being supplemented.  We discussed risks and benefits of elective cardioversion once electrolytes are adequately supplemented.  She is agreeable to proceed but currently prefers this to be scheduled for March  1, 2024.

## 2024-02-29 ENCOUNTER — APPOINTMENT (OUTPATIENT)
Dept: CARDIOLOGY | Facility: HOSPITAL | Age: 89
DRG: 308 | End: 2024-02-29
Payer: MEDICARE

## 2024-02-29 LAB
ANION GAP SERPL CALC-SCNC: 11 MMOL/L (ref 10–20)
BUN SERPL-MCNC: 16 MG/DL (ref 6–23)
CALCIUM SERPL-MCNC: 8.3 MG/DL (ref 8.6–10.3)
CHLORIDE SERPL-SCNC: 109 MMOL/L (ref 98–107)
CO2 SERPL-SCNC: 28 MMOL/L (ref 21–32)
CREAT SERPL-MCNC: 1.09 MG/DL (ref 0.5–1.05)
EGFRCR SERPLBLD CKD-EPI 2021: 49 ML/MIN/1.73M*2
GLUCOSE SERPL-MCNC: 95 MG/DL (ref 74–99)
MAGNESIUM SERPL-MCNC: 1.82 MG/DL (ref 1.6–2.4)
POTASSIUM SERPL-SCNC: 4.2 MMOL/L (ref 3.5–5.3)
SODIUM SERPL-SCNC: 144 MMOL/L (ref 136–145)

## 2024-02-29 PROCEDURE — 97161 PT EVAL LOW COMPLEX 20 MIN: CPT | Mod: GP | Performed by: PHYSICAL THERAPIST

## 2024-02-29 PROCEDURE — 2500000004 HC RX 250 GENERAL PHARMACY W/ HCPCS (ALT 636 FOR OP/ED): Performed by: INTERNAL MEDICINE

## 2024-02-29 PROCEDURE — 36591 DRAW BLOOD OFF VENOUS DEVICE: CPT | Performed by: INTERNAL MEDICINE

## 2024-02-29 PROCEDURE — 93005 ELECTROCARDIOGRAM TRACING: CPT

## 2024-02-29 PROCEDURE — 80048 BASIC METABOLIC PNL TOTAL CA: CPT | Performed by: INTERNAL MEDICINE

## 2024-02-29 PROCEDURE — 2500000001 HC RX 250 WO HCPCS SELF ADMINISTERED DRUGS (ALT 637 FOR MEDICARE OP): Performed by: INTERNAL MEDICINE

## 2024-02-29 PROCEDURE — 2500000001 HC RX 250 WO HCPCS SELF ADMINISTERED DRUGS (ALT 637 FOR MEDICARE OP): Performed by: NURSE PRACTITIONER

## 2024-02-29 PROCEDURE — 99232 SBSQ HOSP IP/OBS MODERATE 35: CPT | Performed by: INTERNAL MEDICINE

## 2024-02-29 PROCEDURE — 83735 ASSAY OF MAGNESIUM: CPT | Performed by: INTERNAL MEDICINE

## 2024-02-29 PROCEDURE — 93010 ELECTROCARDIOGRAM REPORT: CPT | Performed by: INTERNAL MEDICINE

## 2024-02-29 PROCEDURE — 1200000002 HC GENERAL ROOM WITH TELEMETRY DAILY

## 2024-02-29 PROCEDURE — 97165 OT EVAL LOW COMPLEX 30 MIN: CPT | Mod: GO

## 2024-02-29 RX ORDER — SODIUM CHLORIDE 9 MG/ML
10 INJECTION, SOLUTION INTRAVENOUS CONTINUOUS
Status: DISCONTINUED | OUTPATIENT
Start: 2024-03-01 | End: 2024-03-02 | Stop reason: HOSPADM

## 2024-02-29 RX ADMIN — LATANOPROST 1 DROP: 50 SOLUTION OPHTHALMIC at 22:21

## 2024-02-29 RX ADMIN — Medication 5 MG: at 22:20

## 2024-02-29 RX ADMIN — LEVOTHYROXINE SODIUM 50 MCG: 50 TABLET ORAL at 05:46

## 2024-02-29 RX ADMIN — TIMOLOL MALEATE 1 DROP: 5 SOLUTION/ DROPS OPHTHALMIC at 09:10

## 2024-02-29 RX ADMIN — APIXABAN 5 MG: 5 TABLET, FILM COATED ORAL at 08:11

## 2024-02-29 RX ADMIN — ICOSAPENT ETHYL 2 G: 1 CAPSULE ORAL at 16:50

## 2024-02-29 RX ADMIN — ASPIRIN 81 MG: 81 TABLET, COATED ORAL at 08:11

## 2024-02-29 RX ADMIN — FUROSEMIDE 20 MG: 10 INJECTION, SOLUTION INTRAMUSCULAR; INTRAVENOUS at 22:22

## 2024-02-29 RX ADMIN — MAGNESIUM OXIDE 400 MG (241.3 MG MAGNESIUM) TABLET 400 MG: TABLET at 08:11

## 2024-02-29 RX ADMIN — APIXABAN 5 MG: 5 TABLET, FILM COATED ORAL at 22:20

## 2024-02-29 RX ADMIN — FUROSEMIDE 20 MG: 10 INJECTION, SOLUTION INTRAMUSCULAR; INTRAVENOUS at 08:11

## 2024-02-29 RX ADMIN — ICOSAPENT ETHYL 2 G: 1 CAPSULE ORAL at 08:11

## 2024-02-29 RX ADMIN — METOPROLOL TARTRATE 12.5 MG: 25 TABLET, FILM COATED ORAL at 22:20

## 2024-02-29 RX ADMIN — METOPROLOL TARTRATE 12.5 MG: 25 TABLET, FILM COATED ORAL at 08:10

## 2024-02-29 RX ADMIN — AMLODIPINE AND OLMESARTAN MEDOXOMIL 1 TABLET: 10; 40 TABLET ORAL at 05:48

## 2024-02-29 ASSESSMENT — COGNITIVE AND FUNCTIONAL STATUS - GENERAL
TOILETING: A LOT
MOVING TO AND FROM BED TO CHAIR: A LITTLE
MOVING TO AND FROM BED TO CHAIR: A LITTLE
TURNING FROM BACK TO SIDE WHILE IN FLAT BAD: A LITTLE
WALKING IN HOSPITAL ROOM: A LITTLE
DRESSING REGULAR LOWER BODY CLOTHING: A LITTLE
MOBILITY SCORE: 17
PERSONAL GROOMING: A LITTLE
DRESSING REGULAR LOWER BODY CLOTHING: A LITTLE
STANDING UP FROM CHAIR USING ARMS: A LITTLE
CLIMB 3 TO 5 STEPS WITH RAILING: TOTAL
HELP NEEDED FOR BATHING: A LITTLE
DAILY ACTIVITIY SCORE: 19
DRESSING REGULAR UPPER BODY CLOTHING: A LITTLE
DRESSING REGULAR UPPER BODY CLOTHING: A LITTLE
TURNING FROM BACK TO SIDE WHILE IN FLAT BAD: A LITTLE
MOBILITY SCORE: 16
PERSONAL GROOMING: A LITTLE
EATING MEALS: A LITTLE
TOILETING: A LITTLE
WALKING IN HOSPITAL ROOM: A LOT
CLIMB 3 TO 5 STEPS WITH RAILING: A LOT
HELP NEEDED FOR BATHING: A LITTLE
DAILY ACTIVITIY SCORE: 17
MOVING FROM LYING ON BACK TO SITTING ON SIDE OF FLAT BED WITH BEDRAILS: A LITTLE
STANDING UP FROM CHAIR USING ARMS: A LITTLE

## 2024-02-29 ASSESSMENT — PAIN - FUNCTIONAL ASSESSMENT
PAIN_FUNCTIONAL_ASSESSMENT: 0-10

## 2024-02-29 ASSESSMENT — PAIN SCALES - GENERAL
PAINLEVEL_OUTOF10: 0 - NO PAIN
PAINLEVEL_OUTOF10: 3

## 2024-02-29 ASSESSMENT — ACTIVITIES OF DAILY LIVING (ADL): BATHING_ASSISTANCE: MINIMAL

## 2024-02-29 NOTE — DISCHARGE INSTRUCTIONS
HEART FAILURE EDUCATION:  1. Weigh yourself daily and record on your weight log.  2. If you gain more than 2 or 3 pounds overnight, call your cardiologist.  3. Follow a low sodium diet. No more than 2000 mg in one day, or more than 650 mg per meal.  4. Limit total fluids to no more than 8 cups (or 2 liters) per day - this includes all fluids (water, coffee, juice, milk, tea, etc.)  5. Monitor your blood pressure daily and record on your weight log.  6. Call to schedule your follow-up appointments when you get home if they were not already scheduled for you.  7. Keep your follow-up appointments! Bring your weight log with you so the doctors can see your weight trend and blood pressure readings.  8. Be sure to  any new prescriptions and take them as directed. If unsure of the medications, be sure to call your cardiologist.  9. Stay as active as you can tolerate.   10. If you notice subtle change of symptoms (slight increase in swelling, slight shortness of breath, a new intolerance to laying flat, a new cough), be sure to call your cardiologist.  11. If you have any questions or concerns or you have not heard back from the cardiologist, feel free to call Mya Khan heart failure navigator at [phone].

## 2024-02-29 NOTE — NURSING NOTE
CHF Clinical Nurse Navigator Documentation  Congestive Heart Failure disease education was performed by the Clinical Nurse Navigator with a good understanding: yes  CHF signs and symptoms discussed and when to call cardiologist?  yes  Living With Heart Failure Education booklet?  no  Controlling Heart Failure at Home Education? yes  CHF Education Teaching Tool? yes  AMALIA education modules assigned?  no  Home medication usage?  yes  Nutrition Education? Yes-low sodium   Fluid Restriction Education? yes  Daily Weight Education? Yes-daily weight log provided   Cardiovascular Rehab Referral ordered?  no  Follow-up with Cardiologist after discharge education? yes  Comments: Met with patient and daughter at bedside for heart failure education. Both verbalized understanding. Patient lives at home with her daughter. Her cardiologist is Dr. Hsu. She is compliant with all her medications. She is normally compliant with her follow up appointments, however, recently she has missed some due to illness. Stressed the importance of keeping and going to her follow up appointments. No questions or concerns at this time. Provided my contact information should any questions or concerns arise.

## 2024-02-29 NOTE — PROGRESS NOTES
Occupational Therapy    Evaluation    Patient Name: Laura Woodward  MRN: [id number]  Today's Date: 2/29/2024  Time Calculation  Start Time: 0910  Stop Time: 0925  Time Calculation (min): 15 min        Assessment:  Prognosis: Good  End of Session Communication: Bedside nurse, Care Coordinator  End of Session Patient Position: Up in chair, Alarm on  OT Assessment Results: Decreased ADL status, Decreased endurance  Prognosis: Good  Plan:  Treatment Interventions: ADL retraining, Functional transfer training, Endurance training  OT Frequency: 2 times per week  OT Discharge Recommendations: Low intensity level of continued care (24/7 supervision at home)  OT - OK to Discharge: Yes (when medically stable/cleared by medical team.)      Subjective   Current Problem:  1. Atrial flutter, unspecified type (CMS/HCC)        2. Hypokalemia        3. Hypomagnesemia        4. Other hypervolemia        5. Atrial fibrillation, unspecified type (CMS/HCC)  Transthoracic Echo (TTE) Complete    Transthoracic Echo (TTE) Complete      6. PAF (paroxysmal atrial fibrillation) (CMS/HCC)  Transthoracic Echo (TTE) Complete    Transthoracic Echo (TTE) Complete        General:  General  Reason for Referral: ADL impairment  Referred By: Wyatt OT/PT  Past Medical History Relevant to Rehab: Lymphedema, DVT ~1 month ago, HTN, HLD, neuropathy, CKD, depression, anxiety, hypothyrodism,  Family/Caregiver Present: No  Prior to Session Communication: Bedside nurse  Patient Position Received: Bed, 3 rail up, Alarm off, not on at start of session  General Comment: Pt. is 87 y/o female to ED with RLE erythema and edema, L sided rib pain, flu-like symptoms. pt. with new onset of A-fib; cardiology consulted. CXR: (-), Vascular US: chronic nonocclusive DVT L femoral vein, no DVT in RLE. CT angio chest: small to moderate R pleural effusion.  Precautions:  Medical Precautions: Fall precautions    Pain:  Pain Assessment  Pain Assessment: 0-10  Pain Score:  (no  "pain score provided, but pt. states chronic discomfort in neck)  Pain Type: Chronic pain  Pain Location: Neck    Objective   Cognition:  Overall Cognitive Status: Within Functional Limits     Home Living:  Home Living Comments: Pt. lives with daughter in one story house, ramp to enter. Has walk in shower with seat and grab bars. Has basement, doesn't go to basement. Laundry on the main floor.  Prior Function:  Prior Function Comments: Pt. states that she has assist with LB dressing and bathing. Indep toileting and UB dressing. Daughter does all IADLS and manages pt. medications. Pt. uses 3 wheeled RW inside house, uses transport chair when leaving the house for doctors appointments with daughter. [place]itin falls. Does not drive; daughter drives.    ADL:  Eating Assistance: Independent  Grooming Assistance: Stand by  Bathing Assistance: Minimal  UE Dressing Assistance: Stand by  LE Dressing Assistance: Minimal  Toileting Assistance with Device: Stand by  Activity Tolerance:  Endurance: Decreased tolerance for upright activites  Activity Tolerance Comments: pt. states \"I would like to do more and walk further distances, but pain makes me want to sit down\"  Bed Mobility/Transfers: Bed Mobility  Bed Mobility: Yes  Bed Mobility 1  Bed Mobility 1: Supine to sitting  Level of Assistance 1: Contact guard  Bed Mobility Comments 1: head of bed elevated. CGA for safety    Transfers  Transfer: Yes  Transfer 1  Technique 1: Sit to stand, Stand to sit (bedside, BSC)  Transfer Device 1: Walker  Transfer Level of Assistance 1: Contact guard  Trials/Comments 1: CGA for steadying/safety. VCs for safe hand placement.  Transfers 2  Technique 2: Stand pivot  Transfer Device 2: Walker  Transfer Level of Assistance 2: Contact guard  Trials/Comments 2: CGA for steadying/balance/safety    Ambulation/Gait Training:  Ambulation/Gait Training  Ambulation/Gait Training Performed:  (~5 feet in room; WW use. CGA for safety)    Standing " Balance:  Dynamic Standing Balance  Dynamic Standing-Comments: Fair with WW     Strength:  Strength Comments: BUEs WNL MMT    Extremities: RUE   RUE : Within Functional Limits and LUE   LUE: Within Functional Limits    Outcome Measures:[facility] Daily Activity  Putting on and taking off regular lower body clothing: A little  Bathing (including washing, rinsing, drying): A little  Putting on and taking off regular upper body clothing: A little  Toileting, which includes using toilet, bedpan or urinal: A little  Taking care of personal grooming such as brushing teeth: A little  Eating Meals: None  Daily Activity - Total Score: 19        Education Documentation  ADL Training, taught by Katie Jain OT at 2/29/2024 10:48 AM.  Learner: Patient  Readiness: Acceptance  Method: Explanation  Response: Verbalizes Understanding, Needs Reinforcement      IP EDUCATION:  Education  Individual(s) Educated: Patient  Education Provided: Risk and benefits of OT discussed with patient or other, POC discussed and agreed upon    Goals:  Encounter Problems       Encounter Problems (Active)       OT Goals       mod I for all functional transfers  (Progressing)       Start:  02/29/24    Expected End:  03/14/24            Fair +/good dyn standing balance during ADLs and functional activities  (Progressing)       Start:  02/29/24    Expected End:  03/14/24            mod I for LB dressing with AE (Progressing)       Start:  02/29/24    Expected End:  03/14/24            Mod I for toileting tasks and clothing mgmt  (Progressing)       Start:  02/29/24    Expected End:  03/14/24

## 2024-02-29 NOTE — PROGRESS NOTES
Met w/ pt and dtr mary. Both agree on [facility] and prefer [facility]. [facility]  accepted [facility]. Pt will need lab draw in 1 wk. Pt to have a cardioversion tomorrow.

## 2024-02-29 NOTE — PROGRESS NOTES
"Laura Woodward is a 88 y.o. female on day 2 of admission presenting with Atrial fibrillation (CMS/Formerly [facility]).      ASSESSMENT/PLAN   Principal Problem:  -New onset atrial fibrillation, rate is controlled.  In the setting of low magnesium and potassium, both now replaced and remains in A-fib.  Patient is already on anticoagulation with apixaban due to her recent DVT, plan is for DCCV tomorrow  .  Active Problems:  -Hypokalemia-finally replaced with high doses of KCl, starting spironolactone.  Potassium 4.2 today.  -Hypomagnesia-finally replaced after IV and oral, up to 1.82 today.  Will continue oral magnesium oxide.  -Acute CHF with edema, small right pleural effusion-probably due to A-fib, possibly also diastolic and due to her known pulmonary HTN and valvular heart disease.  Decreasing her IV furosemide, added spironolactone 2/28.  Echocardiogram with normal LVEF.  Only mild TR, moderate AR.  -Recent DVT- apixaban.    -Valvular heart disease with moderate aortic stenosis and moderate TR on prior echocardiogram, this admission with mild TR and moderate AR.  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  Creatinine up slightly today to 1.09, furosemide decreased to once daily  -History of mild aortic stenosis    SUBJECTIVE/OBJECTIVE   02/29/24   -Patient says she feels fine, denies any chest pains, no palpitations.  Daughter is in the room with her.  -She is complaining that she still has lots of edema \"I cannot wear my shoes\".  I did discuss with both of them that it may take several weeks for the edema to fully go away and that part of it is due to her lymphedema.  May also have a postphlebitic edema on the left, but she did not have a clot on the right which is the leg that she mainly complains about.  -Discussed " with them DCCV, and how converting back to NSR early is the present goal.  They understand DCCV, agree.  -She is afebrile, blood pressures are fairly well-controlled.  Satting 95% on room air.  -Remains in atrial fibrillation with a controlled rate on telemetry.  -I&O's show she is -2.9 L thus far.  -Chest is clear to auscultation  -Cardiac exam with an irregularly irregular rhythm, controlled rate  -Extremities with 2+ pitting edema along with her lymphedema both legs.  -Chemistry panel with her potassium now normalized after intensive replacement and starting spironolactone, 4.2 today.  -BUN 16 with a creatinine of 1.09.  Will decrease her IV furosemide to once daily.  -Magnesium back to normal at 1.82.  -Echocardiogram with a normal LVEF, left atrium mild to moderately dilated, no mitral stenosis, mild tricuspid regurgitation, moderate aortic valve cusp clarifications with moderate aortic valve regurgitation.    2/28/2024  -Patient says she is feeling good, no significant shortness of breath.  Her daughter is in the room with her, has many questions/concerns which we discussed.  -Daughter has questions about whether the clot will go away or not-I discussed with her the natural history of blood clots, noting that they sometimes will dissolve on their own, other times will still be present but will adhere to the wall of the vessel so not break off and go to the lungs.  -She is alert, in NAD.  -She is afebrile, vital signs stable.  Satting 95% on room air.  -On telemetry she remains in atrial fibrillation with a controlled rate  -I&O-she is -910 cc since admission  -Some basilar crackles bilaterally  -Irregularly irregular with a controlled rate  -No significant change in her right lower extremity edema.  -Chemistry panel shows she is still hypokalemic with a potassium of 2.9-will give IV and oral replacement, start spironolactone.  -BUN 13 with a creatinine of 1.03.  -CBC with a WBC of 2.7, H/H stable at 11.3/35.3.   Platelet count remains low but stable at 116 K  -Echocardiogram just done this morning-patient refused to do it yesterday because she was going to miss her breakfast!  Was complaining again to the echo tech that she would miss her breakfast this morning!  -Cardiology consult appreciated-Dr. Hood spoke to the daughter a possible DCCV.  I discussed at length with both the daughter and the patient what that would entail, the importance of doing it get sooner rather than later to try and keep her in NSR, etc.  We also discussed at length her profound potassium deficit, most likely from the diet, how it can be replaced, etc.    *These notes are being done using Dragon voice recognition technology and may include unintended errors with respect to translation of words, typographical errors or grammar errors which may not have been identified prior to finalization of the chart note.    CURRENT MEDICATIONS     Scheduled Medications:    Current Facility-Administered Medications:     acetaminophen (Tylenol) tablet 650 mg, 650 mg, oral, q4h PRN, Soraya Schneider MD, 650 mg at 02/27/24 2118    alum-mag hydroxide-simeth (Mylanta) 200-200-20 mg/5 mL oral suspension 30 mL, 30 mL, oral, 4x daily PRN, Soraya Schneider MD    amlodipine-olmesartan (Celia) 10-40 mg per tablet 1 tablet, 1 tablet, oral, Daily, Soraya Schneider MD, 1 tablet at 02/29/24 0548    apixaban (Eliquis) tablet 5 mg, 5 mg, oral, BID, Soraya Schneider MD, 5 mg at 02/29/24 0811    aspirin EC tablet 81 mg, 81 mg, oral, Daily, Soraya Schneider MD, 81 mg at 02/29/24 0811    [START ON 3/2/2024] cloNIDine (Catapres-TTS) 0.3 mg/24 hr patch 1 patch, 1 patch, transdermal, Weekly, Soraya Schneider MD    estradiol (Estrace) 0.01 % (0.1 mg/gram) vaginal cream 0.01 g, 0.01 g, vaginal, Nightly, Soraya Schneider MD    furosemide (Lasix) injection 20 mg, 20 mg, intravenous, BID, Soraya Schneider MD, 20 mg at 02/29/24 0811    icosapent ethyL (Vascepa) capsule 2 g, 2 g, oral, BID with meals, Soraya Schneider MD, 2 g at  "02/29/24 0811    latanoprost (Xalatan) 0.005 % ophthalmic solution 1 drop, 1 drop, Both Eyes, Nightly, Soraya Schneider MD, 1 drop at 02/28/24 1938    lenalidomide (Revlimid) capsule 5 mg, 5 mg, oral, Daily, Soraya Schneider MD    levothyroxine (Synthroid, Levoxyl) tablet 50 mcg, 50 mcg, oral, Daily, Soraya Schneider MD, 50 mcg at 02/29/24 0546    magnesium hydroxide (Milk of Magnesia) 2,400 mg/10 mL suspension 10 mL, 10 mL, oral, Daily PRN, Soraya Schneider MD    magnesium oxide (Mag-Ox) tablet 400 mg, 400 mg, oral, Daily, Soraya Schneider MD, 400 mg at 02/29/24 0811    melatonin tablet 5 mg, 5 mg, oral, Nightly PRN, Soraya Schneider MD, 5 mg at 02/28/24 2105    metoprolol tartrate (Lopressor) tablet 12.5 mg, 12.5 mg, oral, BID, Jose Inman, APRLUCILLE-CNP, 12.5 mg at 02/29/24 0810    ondansetron (Zofran) injection 4 mg, 4 mg, intravenous, q4h PRN, Soraya Schneider MD    spironolactone (Aldactone) tablet 12.5 mg, 12.5 mg, oral, q48h, Soraya Schneider MD, 12.5 mg at 02/28/24 1232    timolol (Timoptic) 0.5 % ophthalmic solution 1 drop, 1 drop, ophthalmic (eye), q12h, Soraya Schneider MD, 1 drop at 02/29/24 0910    valACYclovir (Valtrex) tablet 250 mg, 250 mg, oral, Every Mon/Wed/Fri, Soraya Schneider MD, 250 mg at 02/28/24 0939     PRN Medications:  PRN medications   Medication    acetaminophen    alum-mag hydroxide-simeth    magnesium hydroxide    melatonin    ondansetron         IVs:        I&Os     Intake/Output last 3 Shifts:  I/O last 3 completed shifts:  In: 1829 (17.5 mL/kg) [P.O.:1679; I.V.:150 (1.4 mL/kg)]  Out: 4650 (44.5 mL/kg) [Urine:4650 (1.2 mL/kg/hr)]  Weight: 104.5 kg     VITAL SIGNS     Blood pressure 162/77, pulse 73, temperature 36.4 °C (97.5 °F), temperature source Temporal, resp. rate 20, height 1.753 m (5' 9\"), weight 104 kg (230 lb 6.1 oz), SpO2 95 %.     PHYSICAL EXAM   Physical exam:  -General appearance: Pleasant elderly white female, lying in bed, alert and in NAD.  Daughter is in the room with her.  -Vital signs:  As above  -HEENT: No " "icterus  -Neck: No adenopathy  -Chest: Lungs are now clear  -Cardiac: Irregularly irregular with a controlled rate  -Abdomen: Bowel sounds active  -Extremities: No significant improvement in her edema-1-2+ pitting right & left along with her chronic lymphedema  -Skin: No rash  -Neurologic:   Patient is alert and oriented x3.   -Behavior/Emotional:  Appropriate, cooperative    LABS   Relevant Results:  Results from last 7 days   Lab Units 02/29/24 0331 02/28/24  0540 02/27/24  0752   GLUCOSE mg/dL 95 90 84        HbA1c:  No results found for: \"HGBA1C\"  CBC:   Results from last 7 days   Lab Units 02/28/24  0542 02/27/24  0752 02/26/24  1233   WBC AUTO x10*3/uL 2.7* 2.6* 2.7*   RBC AUTO x10*6/uL 3.27* 3.38* 3.31*   HEMOGLOBIN g/dL 11.3* 11.7* 11.3*   HEMATOCRIT % 35.3* 36.5 35.4*   MCV fL 108* 108* 107*   MCH pg 34.6* 34.6* 34.1*   MCHC g/dL 32.0 32.1 31.9*   RDW % 13.0 12.9 13.1   PLATELETS AUTO x10*3/uL 116* 113* 105*         CMP:    Results from last 7 days   Lab Units 02/29/24 0331 02/28/24  0540 02/27/24  0752 02/26/24  1233   SODIUM mmol/L 144 146* 145 145   POTASSIUM mmol/L 4.2 2.9* 3.0* 2.7*   CHLORIDE mmol/L 109* 109* 110* 108*   CO2 mmol/L 28 27 27 26   BUN mg/dL 16 13 12 15   CREATININE mg/dL 1.09* 1.03 0.94 1.07*   GLUCOSE mg/dL 95 90 84 94   PROTEIN TOTAL g/dL  --   --   --  5.2*   CALCIUM mg/dL 8.3* 7.9* 7.9* 7.8*   BILIRUBIN TOTAL mg/dL  --   --   --  0.6   ALK PHOS U/L  --   --   --  81   AST U/L  --   --   --  16   ALT U/L  --   --   --  23       Magnesium:  Results from last 7 days   Lab Units 02/29/24  0331 02/28/24  0540 02/27/24  0752   MAGNESIUM mg/dL 1.82 1.45* 1.60       Troponin:    Results from last 7 days   Lab Units 02/26/24  1417 02/26/24  1233   TROPHS ng/L 17* 18*       INR:  @LABRCNT(INR:3)@  BNP:   Results from last 7 days   Lab Units 02/26/24  1233   BNP pg/mL 401*         IMAGING     Transthoracic Echo (TTE) Complete   Final Result      CT angio chest for pulmonary embolism   Final " Result   1. No pulmonary emboli.   2. Small to moderate right pleural effusion.   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     No evidence for DVT within the right lower extremity.     Bilateral calf veins not visualized due to edema.   Findings similar to prior ultrasound of 01/12/2024.   Findings discussed by telephone as a critical result with Dr. Konrad Lewis on 2/26/2024 at 1340 hours.   Signed by Anders Estrada MD      XR chest 1 view   Final Result   1. No acute pulmonary pathology.   2. Left port is unchanged.   Signed by Ildefonso Oneal MD         Echocardiogram 2/28/2024:  CONCLUSIONS:   1. Left ventricular systolic function is normal with a 55-60% estimated ejection fraction.   2. The left atrium is mild to moderately dilated.   3. There is no evidence of mitral valve stenosis.   4. Mild to moderate tricuspid regurgitation.   5. The aortic valve appears tricuspid with restriction.   6. There is moderate aortic valve cusp calcification.   7. Moderate aortic valve regurgitation.    Soraya Schneider MD

## 2024-02-29 NOTE — PROGRESS NOTES
[facility] Heart Progress Note           Rounding STANLEY/Cardiologist:  Lj Hood MD, Dr. Lj Hood  Primary Cardiologist: Dr. Danny Hsu    Date:  2/29/2024  Patient:  Laura Woodward  YOB: 1935  MRN:  [id number]   Admit Date:  2/26/2024      SUBJECTIVE:    2/29/2024:  Patient is sitting up in the chair.  States she is feeling better.  She denies chest pain or shortness of breath.  She is anxious about having a cardioversion and wants to talk more with her daughter.    2/28/24  Patient is awake and alert and oriented she sitting at the bedside commode she states that she feels a little bit better this morning  Denies chest pain does get some shortness of breath with exertion  EKG's A-fib no acute changes  Telemetry's A-fib 50s to 70s occasional PVC    2/27/24  Laura Woodward is a 88 y.o.  female patient who is being at the request of Dr. Schneider for inpatient consultation of CHF. She was admitted on 2/26/2024.  Previous [facility] and [facility] records have been reviewed in detail.    Patient with a history of DVT, hypertension, morbid obesity came into the emergency department complaining of cough and congestion and fatigue some palpitations been going on and off for the past 1 month.  Her daughter is at the bedside spoke to her at length she states that she had seen Dr. Hsu back in October she had a hernia when seen for preop clearance she had been doing very well.  She comes in found to be in new onset atrial fibrillation so they kindly asked cardiology to get involved with her case.  She denies fever, chills, PND, orthopnea, claudications.  Positive for shortness of breath with exertion, peripheral edema, palpitations  EKG is A-fib no acute changes  Troponin 17     Cardiac  history  9/28/23  CONCLUSIONS:  1. Left ventricular systolic function is normal with a 60% estimated ejection fraction.  2. Spectral Doppler shows an impaired relaxation pattern of left ventricular  diastolic filling.  3. There is moderate concentric left ventricular hypertrophy.  4. There is no evidence of mitral valve stenosis.  5. Trace mitral valve regurgitation.  6. Moderate tricuspid regurgitation.  7. Moderate aortic valve stenosis.  8. The aortic valve appears tricuspid with restriction.  9. There is moderate aortic valve cusp calcification.  10. Moderately elevated pulmonary artery pressure.     10/2/23  IMPRESSION:  Normal Lexiscan Myoview cardiac perfusion stress test.  No evidence of ischemia or myocardial infarction by perfusion imaging.  Normal left ventricular systolic function, ejection fraction 66%.  Noninvasive risk stratification: Low risk.  No previous available for comparison.      VITALS:     Vitals:    02/28/24 1525 02/28/24 1948 02/29/24 0336 02/29/24 0754   BP: 135/80 147/68 137/73 162/77   Patient Position:  Sitting     Pulse: 65 65 66 73   Resp:  20 20 20   Temp: 36.5 °C (97.7 °F) 36.6 °C (97.9 °F) 36.3 °C (97.3 °F) 36.4 °C (97.5 °F)   TempSrc:    Temporal   SpO2: 96% 96% 95% 95%   Weight:       Height:           Intake/Output Summary (Last 24 hours) at 2/29/2024 1205  Last data filed at 2/29/2024 0336  Gross per 24 hour   Intake 1561 ml   Output 2450 ml   Net -889 ml         Wt Readings from Last 4 Encounters:   02/26/24 104 kg (230 lb 6.1 oz)   01/12/24 104 kg (230 lb)   10/19/23 104 kg (229 lb 4.5 oz)   08/30/23 104 kg (230 lb)       CURRENT HOSPITAL MEDICATIONS:   amlodipine-olmesartan, 1 tablet, oral, Daily  apixaban, 5 mg, oral, BID  aspirin, 81 mg, oral, Daily  [START ON 3/2/2024] cloNIDine, 1 patch, transdermal, Weekly  estradiol, 0.01 g, vaginal, Nightly  furosemide, 20 mg, intravenous, BID  icosapent ethyL, 2 g, oral, BID with meals  latanoprost, 1 drop, Both Eyes, Nightly  lenalidomide, 5 mg, oral, Daily  levothyroxine, 50 mcg, oral, Daily  magnesium oxide, 400 mg, oral, Daily  metoprolol tartrate, 12.5 mg, oral, BID  spironolactone, 12.5 mg, oral, q48h  timolol, 1 drop,  ophthalmic (eye), q12h  valACYclovir, 250 mg, oral, Every Mon/Wed/Fri         Current Outpatient Medications   Medication Instructions    acetaminophen (TYLENOL) 650 mg, oral, Every 6 hours PRN    amlodipine-olmesartan (Celia) 10-40 mg tablet 1 tablet, oral, Daily RT    apixaban (ELIQUIS) 10 mg, oral, 2 times daily    apixaban (ELIQUIS) 5 mg, oral, 2 times daily    aspirin (NELDA LOW DOSE ASPIRIN) 81 mg, oral, Daily    benzonatate (TESSALON) 100 mg, oral, 3 times daily PRN, Do not crush or chew.    celecoxib (CELEBREX) 400 mg, oral, Daily    cloNIDine (Catapres-TTS) 0.3 mg/24 hr patch PLACE 1 PATCH ON THE SKIN ONCE  WEEKLY    darifenacin (ENABLEX) 15 mg, oral, Daily    docusate sodium (COLACE) 100 mg, oral, 2 times daily    doxycycline (VIBRAMYCIN) 100 mg, oral, 2 times daily, Take with at least 8 ounces (large glass) of water, do not lie down for 30 minutes after    estradiol (ESTRACE) 0.01 g, vaginal, Nightly, 3 x a week    guaiFENesin (MUCINEX) 600 mg, oral, 2 times daily, Do not crush, chew, or split.    L.acidophilus-Bifido.longum (Probiotic Pearls Acidophilus) 1 billion cell capsule,delayed release(DR/EC) 1 capsule, oral, Daily    latanoprost (Xalatan) 0.005 % ophthalmic solution 1 drop, Both Eyes, Nightly    levothyroxine (SYNTHROID, LEVOXYL) 50 mcg, oral, Daily    omega-3 acid ethyl esters (LOVAZA) 1 g, oral, 2 times daily    Revlimid 5 mg, oral, Daily, As directed per oncology    terconazole (Terazol 7) 0.4 % vaginal cream 1 applicator, vaginal, Nightly, Every night for 7 days    timolol (Timoptic) 0.5 % ophthalmic solution 1 drop, ophthalmic (eye), Every 12 hours    traMADol (ULTRAM) 50 mg, oral, Every 8 hours PRN    trospium (SANCTURA) 20 mg, oral, Every 12 hours    valACYclovir (VALTREX) 500 mg, oral, Daily, 1/2 tablet twice daily 3 times a week. M,W,F     vitamin E 400 Units, oral, Daily        PHYSICAL EXAMINATION:   GENERAL:  Well developed, well nourished, in no acute distress.  CHEST:  Symmetric and  nontender.  NEURO/PSYCH:  Alert and oriented times three with approppriate behavior and responses.  NECK:  Supple, no JVD, no bruit.  LUNGS:  Clear to auscultation bilaterally, normal respiratory effort.  HEART:  s1,  s2 irregular       There are no rubs, clicks or heaves.  EXTREMITIES:  1  plus  pulses. 3+ plus edema      LAB DATA:     CBC:   Results from last 7 days   Lab Units 02/28/24  0542 02/27/24  0752 02/26/24  1233   WBC AUTO x10*3/uL 2.7* 2.6* 2.7*   RBC AUTO x10*6/uL 3.27* 3.38* 3.31*   HEMOGLOBIN g/dL 11.3* 11.7* 11.3*   HEMATOCRIT % 35.3* 36.5 35.4*   MCV fL 108* 108* 107*   MCH pg 34.6* 34.6* 34.1*   MCHC g/dL 32.0 32.1 31.9*   RDW % 13.0 12.9 13.1   PLATELETS AUTO x10*3/uL 116* 113* 105*       CMP:    Results from last 7 days   Lab Units 02/29/24  0331 02/28/24  0540 02/27/24  0752 02/26/24  1233   SODIUM mmol/L 144 146* 145 145   POTASSIUM mmol/L 4.2 2.9* 3.0* 2.7*   CHLORIDE mmol/L 109* 109* 110* 108*   CO2 mmol/L 28 27 27 26   BUN mg/dL 16 13 12 15   CREATININE mg/dL 1.09* 1.03 0.94 1.07*   GLUCOSE mg/dL 95 90 84 94   PROTEIN TOTAL g/dL  --   --   --  5.2*   CALCIUM mg/dL 8.3* 7.9* 7.9* 7.8*   BILIRUBIN TOTAL mg/dL  --   --   --  0.6   ALK PHOS U/L  --   --   --  81   AST U/L  --   --   --  16   ALT U/L  --   --   --  23       BMP:    Results from last 7 days   Lab Units 02/29/24  0331 02/28/24  0540 02/27/24  0752   SODIUM mmol/L 144 146* 145   POTASSIUM mmol/L 4.2 2.9* 3.0*   CHLORIDE mmol/L 109* 109* 110*   CO2 mmol/L 28 27 27   BUN mg/dL 16 13 12   CREATININE mg/dL 1.09* 1.03 0.94   CALCIUM mg/dL 8.3* 7.9* 7.9*   GLUCOSE mg/dL 95 90 84       Magnesium:  Results from last 7 days   Lab Units 02/29/24  0331 02/28/24  0540 02/27/24  0752   MAGNESIUM mg/dL 1.82 1.45* 1.60       Troponin:    Results from last 7 days   Lab Units 02/26/24  1417 02/26/24  1233   TROPHS ng/L 17* 18*       BNP:   Results from last 7 days   Lab Units 02/26/24  1233   BNP pg/mL 401*         DIAGNOSTIC TESTING:     ECG 12  lead  Result Date: 2/27/2024    Atrial fibrillation with slow ventricular response Low voltage QRS ST & T wave abnormality, consider anterior ischemia Abnormal ECG When compared with ECG of 11-MAY-2022 17:08, Atrial fibrillation has replaced Sinus rhythm Inverted T waves have replaced nonspecific T wave abnormality in Inferior leads T wave inversion more evident in Anterior leads See ED provider note for full interpretation and clinical correlation Confirmed by Soraya Schneider ([id number]) on 2/27/2024 9:34:42 AM      RADIOLOGY:     Transthoracic Echo (TTE) Complete   Final Result      CT angio chest for pulmonary embolism   Final Result   1. No pulmonary emboli.   2. Small to moderate right pleural effusion.   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     No evidence for DVT within the right lower extremity.     Bilateral calf veins not visualized due to edema.   Findings similar to prior ultrasound of 01/12/2024.   Findings discussed by telephone as a critical result with Dr. Konrad Lewis on 2/26/2024 at 1340 hours.   Signed by Anders Estrada MD      XR chest 1 view   Final Result   1. No acute pulmonary pathology.   2. Left port is unchanged.   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)    Essential hypertension    Hypothyroidism    Lymphedema of left lower extremity    Osteoporosis    Chronic venous stasis    Insomnia    Hemorrhoids    Obesity (BMI 30.0-34.9)    Depression, major, single episode, mild (CMS/HCC)    Periumbilical hernia    Stage 3a chronic kidney disease (CKD) (CMS/HCC)    Pre-op examination    Encounter for immunization    Ventral hernia, recurrent    Acute deep vein thrombosis (DVT) of femoral vein of left  lower extremity (CMS/HCC)    Pain in right buttock    Hypokalemia    Hypomagnesemia    Generalized pain    On palliative antineoplastic chemotherapy    Anxiety and depression    Atrial fibrillation (CMS/HCC)    Atrial flutter, unspecified type (CMS/HCC)       ASSESSMENT:   New onset atrial fibrillation  Acute on chronic diastolic heart failure NYHA class II  Pleural effusion  History of DVT on Eliquis  Morbid obesity  Hypokalemia  Hypomagnesia  Hypertension      PLAN:    Tele  monitoring  Check echo  Continue the oral anticoagulation Eliquis 5 mg p.o. twice daily  Potassium 40 mEq every 4 hours x 3 doses  Magnesium 4 g IVPBx 1 recheck level in a.m.  Lasix 20 mg IV push every 12  Daily weights  Strict intake and output  Lopressor 12.5 mg p.o. twice daily    Further recommendations per Dr Erwin Inman [facility]    Of note, this documentation is completed using the Dragon Dictation system (voice recognition software). There may be spelling and/or grammatical errors that were not corrected prior to final submission.    Please do not hesitate to call with questions.  Electronically signed by Lj Hood MD, on 2/29/2024 at 12:05 PM     2/28/2024:  She looks and feels fine.  She denies chest pain or shortness of breath.  Vital signs are stable.  Heart rates are in the 60s.  Remains in atrial fibrillation.  Approximate 1 L negative fluid balance yesterday.  Cardiac rhythm is irregularly irregular.  Lungs are clear.  Moderate bilateral peripheral edema.  Hemoglobin is 11.3.  Sodium is 146 with potassium 2.9.  Normal BUN and creatinine.  Magnesium is 1.45.  Potassium and magnesium are being supplemented.  We discussed risks and benefits of elective cardioversion once electrolytes are adequately supplemented.  She is agreeable to proceed but currently prefers this to be scheduled for March 1, 2024.      2/29/2024:  Patient is sitting up in the  chair.  States she is feeling better.  She denies chest pain or shortness of breath.  States her legs feel heavy.  She does not want to go home until her edema significantly improved.  She is anxious about having a cardioversion and wants to talk more with her daughter.  Vital signs are stable with exception of mildly elevated systolic blood pressure.  Heart rate 60s to 70s.  900 cc negative fluid balance yesterday.  Weight is stable.  Cardiac rhythm is irregularly irregular.  Lungs clear.  3+ bilateral peripheral edema as well as apparent lymphedema.  Basic metabolic profile is normal with exception of chloride 109 and creatinine 1.09.  Potassium is now 4.2.  Magnesium was 1.82.  She remains in atrial fibrillation. Continue same cardiac medications including furosemide 20 mg IV BID.  Patient states her preference to delay cardioversion until tomorrow am.

## 2024-02-29 NOTE — PROGRESS NOTES
Physical Therapy    Physical Therapy Evaluation    Patient Name: Laura Woodward  MRN: [id number]  Today's Date: 2/29/2024   Time Calculation  Start Time: 0911  Stop Time: 0924  Time Calculation (min): 13 min    Assessment/Plan   PT Assessment  PT Assessment Results: Decreased endurance, Impaired balance, Decreased mobility  Rehab Prognosis: Good  Evaluation/Treatment Tolerance: Patient limited by fatigue  Medical Staff Made Aware: Yes  Strengths: Living arrangement secure, Housing layout, Ability to acquire knowledge  Barriers to Participation: Comorbidities  End of Session Communication: Bedside nurse, Care Coordinator  End of Session Patient Position: Up in chair, Alarm on  IP OR SWING BED PT PLAN  Inpatient or Swing Bed: Inpatient  PT Plan  Treatment/Interventions: Bed mobility, Transfer training, Gait training, Balance training, Endurance training, Therapeutic exercise  PT Plan: Skilled PT  PT Frequency: 3 times per week  PT Discharge Recommendations: Low intensity level of continued care (Recommend 24/7 A at home)  PT Recommended Transfer Status: Assist x1, Assistive device  Physical Therapy eval completed per MD requisition. P.T. recommendations as outlined above. Recommend D/C from acute care when medically appropriate as deemed by medical staff.    Subjective       General Visit Information:  General  Reason for Referral: impaired mobility  Referred By: Dr. Schneider (PT/OT 2/28)  Past Medical History Relevant to Rehab: includes: Lymphedema, DVT ~1 month ago, HTN, HLD, neuropathy, CKD, depression, anxiety, hypothyrodism,  Family/Caregiver Present: No  Prior to Session Communication: Bedside nurse (OK to see per RN)  Patient Position Received: Bed, 3 rail up, Alarm off, not on at start of session  Preferred Learning Style: auditory, verbal  General Comment: Pt. is 89yo who presented to [facility] ED on 2/26/2024 with c/o R LE erythema and edema, L sided rib pain, flu-like symptoms. pt. with new onset of A-fib;  cardiology consulted.   CXR: (-), Vascular   B LE US: chronic nonocclusive DVT L femoral vein, no DVT in RLE.   CT angio chest: small to moderate R pleural effusion.   K (2/28) 2.9   (-) Flu, RSV, Covid   Dx: hypokalemia, hypomagnesia    Home Living:  Home Living  Home Living Comments: Pt. lives with daughter in 1 level house with ramp to enter. Bed/bath on 1st floor with walkin shower with seat and grab bars. Has basement, doesn't go to basement. Laundry on the main floor.    Prior Level of Function:  Prior Function Per Pt/Caregiver Report  Prior Function Comments: Pt. states that she has assist with LB dressing and bathing. Indep toileting and UB dressing. Daughter does all IADLs and manages pt. medications. Pt. amb with 3 wheeled RW inside house, used transport chair when leaving the house for doctors appointments with daughter. Pt. denied falls in last 3 months. Pt. does not drive; daughter drives.    Precautions:  Precautions  Medical Precautions:  (Activity order, No restrictions, I&O)  Precautions Comment: Per EMR: High fall risk         Objective     Pain:  Pain Assessment  Pain Assessment: 0-10  Pain Score:  (c/o chronic neck pain but did not rate)  Pain Type: Chronic pain  Pain Location: Neck    Cognition:  Cognition  Overall Cognitive Status: Within Functional Limits    General Assessments:  General Observation  General Observation: Soft knee brace L knee   Activity Tolerance  Endurance: Decreased tolerance for upright activites                 Dynamic Sitting Balance  Dynamic Sitting-Comments: Good static adn dynmaic sitting balance  Dynamic Standing Balance  Dynamic Standing-Comments: Fair+ static and dynmaic standing balance    Functional Assessments:     Bed Mobility  Bed Mobility: Yes  Bed Mobility 1  Level of Assistance 1: Contact guard  Bed Mobility Comments 1: HOB elevated  Transfers  Transfer: Yes  Transfer 1  Technique 1: Sit to stand  Transfer Device 1:  (FWW)  Transfer Level of Assistance 1:  Contact guard  Trials/Comments 1: CGA for safety  Transfers 2  Technique 2: Stand to sit  Transfer Device 2:  (FWW)  Transfer Level of Assistance 2: Contact guard  Trials/Comments 2: CGA for safety. VC's for hand placement  Transfers 3  Technique 3: Stand pivot  Transfer Device 3:  (FWW)  Transfer Level of Assistance 3: Contact guard  Trials/Comments 3: CGA for safety  Ambulation/Gait Training  Ambulation/Gait Training Performed: Yes  Ambulation/Gait Training 1  Surface 1: Level tile  Device 1: Rolling walker  Assistance 1: Contact guard  Quality of Gait 1: Narrow base of support (s,ow, step-to gait pattern with shortened step lengths)  Comments/Distance (ft) 1: 10';  CGA for safety. VC's for walker placement  Stairs  Stairs: No       Extremity/Trunk Assessments:        RLE   RLE : Within Functional Limits  LLE   LLE : Within Functional Limits    Outcome Measures:  [facility] Basic Mobility  Turning from your back to your side while in a flat bed without using bedrails: A little  Moving from lying on your back to sitting on the side of a flat bed without using bedrails: A little  Moving to and from bed to chair (including a wheelchair): A little  Standing up from a chair using your arms (e.g. wheelchair or bedside chair): A little  To walk in hospital room: A little  Climbing 3-5 steps with railing: Total  Basic Mobility - Total Score: 16                            Goals:  Encounter Problems       Encounter Problems (Active)       PT Problem       Pt. will transfer supine/sit with MOD I (Progressing)       Start:  02/29/24    Expected End:  03/14/24            Pt. will transfer sit/stand with FWW with MOD I (Progressing)       Start:  02/29/24    Expected End:  03/14/24            Pt. will complete stand pivot transfers with FWW with MOD I (Progressing)       Start:  02/29/24    Expected End:  03/14/24            Pt.will ambulate 40' with FWW with SBA (Progressing)       Start:  02/29/24    Expected End:  03/14/24             Pt. will perform 2 x 15 B LE AROM exercises  (Not Progressing)       Start:  02/29/24    Expected End:  03/14/24                 Education Documentation  Mobility Training, taught by Dc Johnson, PT at 2/29/2024  1:07 PM.  Learner: Patient  Readiness: Acceptance  Method: Explanation  Response: Verbalizes Understanding, Needs Reinforcement  Comment: Role of Pt, transfers, amb. safety, PT POC

## 2024-02-29 NOTE — NURSING NOTE
"When changing dressing of pt. Left upper chest port site, needle came out with the dressing. No active bleeding. Site covered. This RN attempted to insert a #19  0.5\" needle without success. Pt. Tolerated well. No active bleeding at site. Site covered with DSD at this time.  "

## 2024-02-29 NOTE — CARE PLAN
Problem: Pain  Goal: My pain/discomfort is manageable  Outcome: Progressing     Problem: Arrythmia/Dysrhythmia  Goal: Lab values return to normal range  Outcome: Progressing  Goal: No evidence of post procedure complications  Outcome: Progressing  Goal: Promote self management  Outcome: Progressing  Goal: Serial ECG will return to baseline  Outcome: Progressing  Goal: Verbalize understanding of procedures/devices  Outcome: Progressing  Goal: Vital signs return to baseline  Outcome: Progressing  Goal: Care and maintenance of device (specify)  Outcome: Progressing     Problem: Fall/Injury  Goal: Not fall by end of shift  Outcome: Progressing  Goal: Be free from injury by end of the shift  Outcome: Progressing  Goal: Verbalize understanding of personal risk factors for fall in the hospital  Outcome: Progressing  Goal: Verbalize understanding of risk factor reduction measures to prevent injury from fall in the home  Outcome: Progressing  Goal: Use assistive devices by end of the shift  Outcome: Progressing     Problem: Fall/Injury  Goal: Not fall by end of shift  Outcome: Progressing  Goal: Be free from injury by end of the shift  Outcome: Progressing  Goal: Verbalize understanding of personal risk factors for fall in the hospital  Outcome: Progressing  Goal: Verbalize understanding of risk factor reduction measures to prevent injury from fall in the home  Outcome: Progressing  Goal: Use assistive devices by end of the shift  Outcome: Progressing   The patient's goals for the shift include patient will decrease the swelling of her lower extremities    The clinical goals for the shift include Pt will have improvement in SOB, pt states new onset overnight    Over the shift, the patient did not make progress toward the following goals. Barriers to progression include . Recommendations to address these barriers include .

## 2024-03-01 ENCOUNTER — APPOINTMENT (OUTPATIENT)
Dept: CARDIOLOGY | Facility: HOSPITAL | Age: 89
DRG: 308 | End: 2024-03-01
Payer: MEDICARE

## 2024-03-01 ENCOUNTER — PATIENT OUTREACH (OUTPATIENT)
Dept: PRIMARY CARE | Facility: CLINIC | Age: 89
End: 2024-03-01

## 2024-03-01 ENCOUNTER — ANESTHESIA (OUTPATIENT)
Dept: CARDIOLOGY | Facility: HOSPITAL | Age: 89
DRG: 308 | End: 2024-03-01
Payer: MEDICARE

## 2024-03-01 ENCOUNTER — ANESTHESIA EVENT (OUTPATIENT)
Dept: CARDIOLOGY | Facility: HOSPITAL | Age: 89
DRG: 308 | End: 2024-03-01
Payer: MEDICARE

## 2024-03-01 LAB
ANION GAP SERPL CALC-SCNC: 11 MMOL/L (ref 10–20)
BODY SURFACE AREA: 2.26 M2
BUN SERPL-MCNC: 15 MG/DL (ref 6–23)
CALCIUM SERPL-MCNC: 8.8 MG/DL (ref 8.6–10.3)
CHLORIDE SERPL-SCNC: 106 MMOL/L (ref 98–107)
CO2 SERPL-SCNC: 31 MMOL/L (ref 21–32)
CREAT SERPL-MCNC: 1.09 MG/DL (ref 0.5–1.05)
EGFRCR SERPLBLD CKD-EPI 2021: 49 ML/MIN/1.73M*2
ERYTHROCYTE [DISTWIDTH] IN BLOOD BY AUTOMATED COUNT: 13 % (ref 11.5–14.5)
GLUCOSE SERPL-MCNC: 90 MG/DL (ref 74–99)
HCT VFR BLD AUTO: 38.6 % (ref 36–46)
HGB BLD-MCNC: 12.3 G/DL (ref 12–16)
MAGNESIUM SERPL-MCNC: 1.6 MG/DL (ref 1.6–2.4)
MCH RBC QN AUTO: 34.6 PG (ref 26–34)
MCHC RBC AUTO-ENTMCNC: 31.9 G/DL (ref 32–36)
MCV RBC AUTO: 109 FL (ref 80–100)
NRBC BLD-RTO: 0 /100 WBCS (ref 0–0)
PLATELET # BLD AUTO: 157 X10*3/UL (ref 150–450)
POTASSIUM SERPL-SCNC: 3.6 MMOL/L (ref 3.5–5.3)
RBC # BLD AUTO: 3.55 X10*6/UL (ref 4–5.2)
SODIUM SERPL-SCNC: 144 MMOL/L (ref 136–145)
WBC # BLD AUTO: 4 X10*3/UL (ref 4.4–11.3)

## 2024-03-01 PROCEDURE — 5A2204Z RESTORATION OF CARDIAC RHYTHM, SINGLE: ICD-10-PCS | Performed by: INTERNAL MEDICINE

## 2024-03-01 PROCEDURE — 2500000005 HC RX 250 GENERAL PHARMACY W/O HCPCS: Performed by: INTERNAL MEDICINE

## 2024-03-01 PROCEDURE — 2500000004 HC RX 250 GENERAL PHARMACY W/ HCPCS (ALT 636 FOR OP/ED): Performed by: NURSE PRACTITIONER

## 2024-03-01 PROCEDURE — 93010 ELECTROCARDIOGRAM REPORT: CPT | Performed by: INTERNAL MEDICINE

## 2024-03-01 PROCEDURE — 92960 CARDIOVERSION ELECTRIC EXT: CPT

## 2024-03-01 PROCEDURE — 3700000001 HC GENERAL ANESTHESIA TIME - INITIAL BASE CHARGE

## 2024-03-01 PROCEDURE — 2500000004 HC RX 250 GENERAL PHARMACY W/ HCPCS (ALT 636 FOR OP/ED): Performed by: INTERNAL MEDICINE

## 2024-03-01 PROCEDURE — 80048 BASIC METABOLIC PNL TOTAL CA: CPT | Performed by: INTERNAL MEDICINE

## 2024-03-01 PROCEDURE — 99232 SBSQ HOSP IP/OBS MODERATE 35: CPT | Performed by: INTERNAL MEDICINE

## 2024-03-01 PROCEDURE — 99233 SBSQ HOSP IP/OBS HIGH 50: CPT | Performed by: INTERNAL MEDICINE

## 2024-03-01 PROCEDURE — 1200000002 HC GENERAL ROOM WITH TELEMETRY DAILY

## 2024-03-01 PROCEDURE — 93005 ELECTROCARDIOGRAM TRACING: CPT

## 2024-03-01 PROCEDURE — 3700000002 HC GENERAL ANESTHESIA TIME - EACH INCREMENTAL 1 MINUTE

## 2024-03-01 PROCEDURE — 2500000004 HC RX 250 GENERAL PHARMACY W/ HCPCS (ALT 636 FOR OP/ED)

## 2024-03-01 PROCEDURE — 85027 COMPLETE CBC AUTOMATED: CPT | Performed by: INTERNAL MEDICINE

## 2024-03-01 PROCEDURE — 92960 CARDIOVERSION ELECTRIC EXT: CPT | Performed by: INTERNAL MEDICINE

## 2024-03-01 PROCEDURE — 2500000002 HC RX 250 W HCPCS SELF ADMINISTERED DRUGS (ALT 637 FOR MEDICARE OP, ALT 636 FOR OP/ED): Mod: MUE | Performed by: INTERNAL MEDICINE

## 2024-03-01 PROCEDURE — 2500000001 HC RX 250 WO HCPCS SELF ADMINISTERED DRUGS (ALT 637 FOR MEDICARE OP): Performed by: INTERNAL MEDICINE

## 2024-03-01 PROCEDURE — 83735 ASSAY OF MAGNESIUM: CPT | Performed by: INTERNAL MEDICINE

## 2024-03-01 PROCEDURE — 2500000001 HC RX 250 WO HCPCS SELF ADMINISTERED DRUGS (ALT 637 FOR MEDICARE OP): Performed by: NURSE PRACTITIONER

## 2024-03-01 RX ORDER — POTASSIUM CHLORIDE 20 MEQ/1
20 TABLET, EXTENDED RELEASE ORAL DAILY
Status: DISCONTINUED | OUTPATIENT
Start: 2024-03-01 | End: 2024-03-02 | Stop reason: HOSPADM

## 2024-03-01 RX ORDER — METOPROLOL SUCCINATE 25 MG/1
12.5 TABLET, EXTENDED RELEASE ORAL DAILY
Status: DISCONTINUED | OUTPATIENT
Start: 2024-03-02 | End: 2024-03-02 | Stop reason: HOSPADM

## 2024-03-01 RX ORDER — MAGNESIUM SULFATE HEPTAHYDRATE 40 MG/ML
4 INJECTION, SOLUTION INTRAVENOUS ONCE
Status: COMPLETED | OUTPATIENT
Start: 2024-03-01 | End: 2024-03-01

## 2024-03-01 RX ORDER — PROPOFOL 10 MG/ML
INJECTION, EMULSION INTRAVENOUS AS NEEDED
Status: DISCONTINUED | OUTPATIENT
Start: 2024-03-01 | End: 2024-03-01

## 2024-03-01 RX ORDER — LANOLIN ALCOHOL/MO/W.PET/CERES
400 CREAM (GRAM) TOPICAL 2 TIMES DAILY
Status: DISCONTINUED | OUTPATIENT
Start: 2024-03-01 | End: 2024-03-02 | Stop reason: HOSPADM

## 2024-03-01 RX ORDER — SPIRONOLACTONE 25 MG/1
12.5 TABLET ORAL DAILY
Status: DISCONTINUED | OUTPATIENT
Start: 2024-03-02 | End: 2024-03-02 | Stop reason: HOSPADM

## 2024-03-01 RX ADMIN — MAGNESIUM SULFATE HEPTAHYDRATE 4 G: 40 INJECTION, SOLUTION INTRAVENOUS at 08:21

## 2024-03-01 RX ADMIN — MAGNESIUM OXIDE 400 MG (241.3 MG MAGNESIUM) TABLET 400 MG: TABLET at 20:48

## 2024-03-01 RX ADMIN — ICOSAPENT ETHYL 2 G: 1 CAPSULE ORAL at 16:05

## 2024-03-01 RX ADMIN — POTASSIUM CHLORIDE 20 MEQ: 1500 TABLET, EXTENDED RELEASE ORAL at 16:05

## 2024-03-01 RX ADMIN — APIXABAN 5 MG: 5 TABLET, FILM COATED ORAL at 08:21

## 2024-03-01 RX ADMIN — APIXABAN 5 MG: 5 TABLET, FILM COATED ORAL at 20:48

## 2024-03-01 RX ADMIN — AMLODIPINE AND OLMESARTAN MEDOXOMIL 1 TABLET: 10; 40 TABLET ORAL at 06:00

## 2024-03-01 RX ADMIN — SODIUM CHLORIDE 10 ML/HR: 9 INJECTION, SOLUTION INTRAVENOUS at 05:59

## 2024-03-01 RX ADMIN — Medication 3 L/MIN: at 13:14

## 2024-03-01 RX ADMIN — METOPROLOL TARTRATE 12.5 MG: 25 TABLET, FILM COATED ORAL at 08:21

## 2024-03-01 RX ADMIN — FUROSEMIDE 20 MG: 10 INJECTION, SOLUTION INTRAMUSCULAR; INTRAVENOUS at 20:48

## 2024-03-01 RX ADMIN — FUROSEMIDE 20 MG: 10 INJECTION, SOLUTION INTRAMUSCULAR; INTRAVENOUS at 08:21

## 2024-03-01 RX ADMIN — LATANOPROST 1 DROP: 50 SOLUTION OPHTHALMIC at 20:54

## 2024-03-01 RX ADMIN — MAGNESIUM OXIDE 400 MG (241.3 MG MAGNESIUM) TABLET 400 MG: TABLET at 08:21

## 2024-03-01 RX ADMIN — ASPIRIN 81 MG: 81 TABLET, COATED ORAL at 08:21

## 2024-03-01 RX ADMIN — TIMOLOL MALEATE 1 DROP: 5 SOLUTION/ DROPS OPHTHALMIC at 09:28

## 2024-03-01 RX ADMIN — PROPOFOL 50 MG: 10 INJECTION, EMULSION INTRAVENOUS at 13:25

## 2024-03-01 RX ADMIN — TIMOLOL MALEATE 1 DROP: 5 SOLUTION/ DROPS OPHTHALMIC at 20:49

## 2024-03-01 RX ADMIN — LEVOTHYROXINE SODIUM 50 MCG: 50 TABLET ORAL at 05:58

## 2024-03-01 ASSESSMENT — COGNITIVE AND FUNCTIONAL STATUS - GENERAL
MOBILITY SCORE: 16
PERSONAL GROOMING: A LITTLE
MOVING FROM LYING ON BACK TO SITTING ON SIDE OF FLAT BED WITH BEDRAILS: A LITTLE
WALKING IN HOSPITAL ROOM: A LITTLE
MOVING TO AND FROM BED TO CHAIR: A LITTLE
TOILETING: A LITTLE
HELP NEEDED FOR BATHING: A LITTLE
STANDING UP FROM CHAIR USING ARMS: A LITTLE
DAILY ACTIVITIY SCORE: 19
CLIMB 3 TO 5 STEPS WITH RAILING: TOTAL
DRESSING REGULAR LOWER BODY CLOTHING: A LITTLE
DRESSING REGULAR UPPER BODY CLOTHING: A LITTLE
TURNING FROM BACK TO SIDE WHILE IN FLAT BAD: A LITTLE

## 2024-03-01 ASSESSMENT — PAIN SCALES - GENERAL
PAINLEVEL_OUTOF10: 0 - NO PAIN
PAINLEVEL_OUTOF10: 0 - NO PAIN

## 2024-03-01 NOTE — PRE-PROCEDURE ASSESSMENT
ACC-NCDR CathPCI V5 Collection Form    Pre-Procedure Information  - Electrocardiac assessment method: telemetry monitor    Indications and Presentation  - Indication(s) for cath lab visit: cardiac arrhythmia    - Ventricular support was not required.     Patient for direct-current cardioversion  Indication symptomatic atrial fibrillation  Currently comfortable lying supine not short of breath unaware of her atrial fibrillation as long as not active  Risks and benefits of the procedure discussed in detail she understands agrees to proceed  
2019 17:28

## 2024-03-01 NOTE — ANESTHESIA POSTPROCEDURE EVALUATION
Patient: Laura Woodward    Procedure Summary       Date: 03/01/24 Room / Location: [facility]    Anesthesia Start: 1324 Anesthesia Stop: 1333    Procedure: CARDIOVERSION EXTERNAL Diagnosis: PAF (paroxysmal atrial fibrillation) (CMS/[facility])    Scheduled Providers: Idris Ennis MD Responsible Provider: Herbert Gutierrez DO    Anesthesia Type: MAC ASA Status: Not recorded            Anesthesia Type: MAC      Anesthesia Post Evaluation    Patient location during evaluation: PACU  Patient participation: complete - patient participated  Level of consciousness: awake  Pain management: adequate  Multimodal analgesia pain management approach  Airway patency: patent  Cardiovascular status: acceptable  Respiratory status: acceptable  Hydration status: acceptable  Postoperative Nausea and Vomiting: none        No notable events documented.

## 2024-03-01 NOTE — ANESTHESIA PREPROCEDURE EVALUATION
Patient: Laura Woodward    Procedure Information       Anesthesia Start Date/Time: 03/01/24 1324    Scheduled providers: Idris Ennis MD    Procedure: CARDIOVERSION EXTERNAL    Location: [facility]            Relevant Problems   Cardiovascular   (+) Aortic stenosis   (+) Atrial fibrillation (CMS/HCC)   (+) Atrial flutter, unspecified type (CMS/HCC)   (+) Essential hypertension      Endocrine   (+) Hypothyroidism      /Renal   (+) Stage 3a chronic kidney disease (CKD) (CMS/HCC)      Neuro/Psych   (+) Anxiety and depression   (+) Depression, major, single episode, mild (CMS/HCC)      Hematology   (+) 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  Anesthetic plan and risks discussed with patient.

## 2024-03-01 NOTE — PROGRESS NOTES
Laura Woodward is a 88 y.o. female on day 3 of admission presenting with Atrial fibrillation (CMS/[facility]).      ASSESSMENT/PLAN   Principal Problem:  -New onset atrial fibrillation, rate is controlled.  In the setting of low magnesium and potassium, both now replaced and remains in A-fib.  Patient is already on anticoagulation with apixaban due to her recent DVT, plan is for DCCV this afternoon    Active Problems:  -Hypokalemia-finally replaced with high doses of KCl, starting spironolactone.  Potassium down to 3.6 this morning, giving more oral and increasing her spironolactone with goal of >4.0.  -Hypomagnesia-finally replaced after IV and oral, up to 1.60 today.  Continuing oral magnesium oxide.  -Acute CHF with edema, small right pleural effusion-probably due to A-fib, possibly also diastolic and due to her known pulmonary HTN and valvular heart disease.  Continuing to diurese, -5.5 L this admission.  Echocardiogram with normal LVEF, mild TR, moderate AR.  -Recent DVT-on apixaban.    -Valvular heart disease with moderate aortic stenosis and moderate TR on prior echocardiogram, this admission with mild TR and moderate AR.  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  Creatinine up slightly today to 1.09, furosemide decreased to once daily  -History of mild aortic stenosis    SUBJECTIVE/OBJECTIVE   03/01/24   -Feels okay, waiting to go for her DCCV.  Does not feel overly anxious.  -Daughter, patient and I discussed with her treatment, reviewed her lab results.  Their questions were answered.  I also discussed with them that she may have chronic edema of her left leg due to the blood clot, daughter is asking if the blood clot is still there-I again told them that we do not check as long as she is on  "anticoagulation, and that the body will either reabsorb it or it will at least become attached to the blood vessels so it would not go to the lungs.  -She is afebrile, blood pressures are good.  Satting 96% on room air.  -Remains in A-fib with a controlled rate on telemetry this morning.  -I&O shows that she is now -5.5 L this admission, put out 7.6 L of urine yesterday.  -Chest is clear to auscultation  -Cardiac exam is an irregularly irregular rhythm with a controlled rate  -Her right lower extremity edema seems to be a little softer  -Chemistry panel with a sodium of 144, potassium down to 3.6-will give more oral replacement with a goal of >4.0.  Also increasing her spironolactone to daily.  Chloride 106, bicarb 31.  -BUN 15 with a creatinine stable at 1.09.  -Magnesium level 1.60-will increase her oral replacement with a goal of >2.0.  -CBC with a WBC up to 4.0, H/H up to 12.3/38.6.  Platelet count 157 K.      2/29/2024  -Patient says she feels fine, denies any chest pains, no palpitations.  Daughter is in the room with her.  -She is complaining that she still has lots of edema \"I cannot wear my shoes\".  I did discuss with both of them that it may take several weeks for the edema to fully go away and that part of it is due to her lymphedema.  May also have a postphlebitic edema on the left, but she did not have a clot on the right which is the leg that she mainly complains about.  -Discussed with them DCCV, and how converting back to NSR early is the present goal.  They understand DCCV, agree.  -She is afebrile, blood pressures are fairly well-controlled.  Satting 95% on room air.  -Remains in atrial fibrillation with a controlled rate on telemetry.  -I&O's show she is -2.9 L thus far.  -Chest is clear to auscultation  -Cardiac exam with an irregularly irregular rhythm, controlled rate  -Extremities with 2+ pitting edema along with her lymphedema both legs.  -Chemistry panel with her potassium now normalized " after intensive replacement and starting spironolactone, 4.2 today.  -BUN 16 with a creatinine of 1.09.  Will decrease her IV furosemide to once daily.  -Magnesium back to normal at 1.82.  -Echocardiogram with a normal LVEF, left atrium mild to moderately dilated, no mitral stenosis, mild tricuspid regurgitation, moderate aortic valve cusp clarifications with moderate aortic valve regurgitation.    2/28/2024  -Patient says she is feeling good, no significant shortness of breath.  Her daughter is in the room with her, has many questions/concerns which we discussed.  -Daughter has questions about whether the clot will go away or not-I discussed with her the natural history of blood clots, noting that they sometimes will dissolve on their own, other times will still be present but will adhere to the wall of the vessel so not break off and go to the lungs.  -She is alert, in NAD.  -She is afebrile, vital signs stable.  Satting 95% on room air.  -On telemetry she remains in atrial fibrillation with a controlled rate  -I&O-she is -910 cc since admission  -Some basilar crackles bilaterally  -Irregularly irregular with a controlled rate  -No significant change in her right lower extremity edema.  -Chemistry panel shows she is still hypokalemic with a potassium of 2.9-will give IV and oral replacement, start spironolactone.  -BUN 13 with a creatinine of 1.03.  -CBC with a WBC of 2.7, H/H stable at 11.3/35.3.  Platelet count remains low but stable at 116 K  -Echocardiogram just done this morning-patient refused to do it yesterday because she was going to miss her breakfast!  Was complaining again to the echo tech that she would miss her breakfast this morning!  -Cardiology consult appreciated-Dr. Hood spoke to the daughter a possible DCCV.  I discussed at length with both the daughter and the patient what that would entail, the importance of doing it get sooner rather than later to try and keep her in NSR, etc.  We also  discussed at length her profound potassium deficit, most likely from the diet, how it can be replaced, etc.    *These notes are being done using Dragon voice recognition technology and may include unintended errors with respect to translation of words, typographical errors or grammar errors which may not have been identified prior to finalization of the chart note.    CURRENT MEDICATIONS     Scheduled Medications:    Current Facility-Administered Medications:     acetaminophen (Tylenol) tablet 650 mg, 650 mg, oral, q4h PRN, Soraya Schneider MD, 650 mg at 02/27/24 2118    alum-mag hydroxide-simeth (Mylanta) 200-200-20 mg/5 mL oral suspension 30 mL, 30 mL, oral, 4x daily PRN, Soraya Schneider MD    amlodipine-olmesartan (Celia) 10-40 mg per tablet 1 tablet, 1 tablet, oral, Daily, Soraya Schneider MD, 1 tablet at 03/01/24 0600    apixaban (Eliquis) tablet 5 mg, 5 mg, oral, BID, Soraya Schneider MD, 5 mg at 03/01/24 0821    aspirin EC tablet 81 mg, 81 mg, oral, Daily, Soraya Schneider MD, 81 mg at 03/01/24 0821    [START ON 3/2/2024] cloNIDine (Catapres-TTS) 0.3 mg/24 hr patch 1 patch, 1 patch, transdermal, Weekly, Soraya Schneider MD    estradiol (Estrace) 0.01 % (0.1 mg/gram) vaginal cream 0.01 g, 0.01 g, vaginal, Nightly, Soraya Schneider MD    furosemide (Lasix) injection 20 mg, 20 mg, intravenous, BID, Soraya Schneider MD, 20 mg at 03/01/24 0821    icosapent ethyL (Vascepa) capsule 2 g, 2 g, oral, BID with meals, Soraya Schneider MD, 2 g at 02/29/24 1650    latanoprost (Xalatan) 0.005 % ophthalmic solution 1 drop, 1 drop, Both Eyes, Nightly, Soraya Schneider MD, 1 drop at 02/29/24 2221    lenalidomide (Revlimid) capsule 5 mg, 5 mg, oral, Daily, Soraya Schneider MD    levothyroxine (Synthroid, Levoxyl) tablet 50 mcg, 50 mcg, oral, Daily, Soraya Schneider MD, 50 mcg at 03/01/24 0558    magnesium hydroxide (Milk of Magnesia) 2,400 mg/10 mL suspension 10 mL, 10 mL, oral, Daily PRN, Soraya Schneider MD    magnesium oxide (Mag-Ox) tablet 400 mg, 400 mg, oral, Daily, Soraya Schneider MD, 400  "mg at 03/01/24 0821    magnesium sulfate IV 4 g, 4 g, intravenous, Once, OSCAR Stewart, Last Rate: 25 mL/hr at 03/01/24 0821, 4 g at 03/01/24 0821    melatonin tablet 5 mg, 5 mg, oral, Nightly PRN, Soraya cShneider MD, 5 mg at 02/29/24 2220    metoprolol tartrate (Lopressor) tablet 12.5 mg, 12.5 mg, oral, BID, OSCAR Stewart, 12.5 mg at 03/01/24 0821    ondansetron (Zofran) injection 4 mg, 4 mg, intravenous, q4h PRN, Soraya Schneider MD    sodium chloride 0.9% infusion, 10 mL/hr, intravenous, Continuous, OSCAR Hurd, Last Rate: 10 mL/hr at 03/01/24 0559, 10 mL/hr at 03/01/24 0559    spironolactone (Aldactone) tablet 12.5 mg, 12.5 mg, oral, q48h, Soraya Schneider MD, 12.5 mg at 02/28/24 1232    timolol (Timoptic) 0.5 % ophthalmic solution 1 drop, 1 drop, ophthalmic (eye), q12h, Soraya Schneider MD, 1 drop at 02/29/24 0910    valACYclovir (Valtrex) tablet 250 mg, 250 mg, oral, Every Mon/Wed/Fri, Soraya Schneider MD, 250 mg at 02/28/24 0939     PRN Medications:  PRN medications   Medication    acetaminophen    alum-mag hydroxide-simeth    magnesium hydroxide    melatonin    ondansetron         IVs:  sodium chloride 0.9%, 10 mL/hr, Last Rate: 10 mL/hr (03/01/24 0559)         I&Os     Intake/Output last 3 Shifts:  I/O last 3 completed shifts:  In: - (0 mL/kg)   Out: 5050 (48.3 mL/kg) [Urine:5050 (1.3 mL/kg/hr)]  Weight: 104.5 kg     VITAL SIGNS     Blood pressure 132/72, pulse 68, temperature 36.4 °C (97.5 °F), resp. rate 16, height 1.753 m (5' 9\"), weight 104 kg (230 lb 6.1 oz), SpO2 96 %.     PHYSICAL EXAM   Physical exam:  -General appearance: Pleasant elderly white female, lying in bed, alert and in NAD.  Daughter is in the room with her.  -Vital signs:  As above  -HEENT: No icterus  -Neck: No adenopathy  -Chest: Lungs areclear  -Cardiac: Irregularly irregular with a controlled rate  -Abdomen: Bowel sounds active  -Extremities: Right lower extremity edema seems to be softer, still 1-2+ pitting on top of " "her lymphedema  -Skin: No rash  -Neurologic:   Patient is alert and oriented x3.   -Behavior/Emotional:  Appropriate, cooperative    LABS   Relevant Results:  Results from last 7 days   Lab Units 03/01/24  0500 02/29/24  0331 02/28/24  0540   GLUCOSE mg/dL 90 95 90        HbA1c:  No results found for: \"HGBA1C\"  CBC:   Results from last 7 days   Lab Units 03/01/24  0500 02/28/24  0542 02/27/24  0752   WBC AUTO x10*3/uL 4.0* 2.7* 2.6*   RBC AUTO x10*6/uL 3.55* 3.27* 3.38*   HEMOGLOBIN g/dL 12.3 11.3* 11.7*   HEMATOCRIT % 38.6 35.3* 36.5   MCV fL 109* 108* 108*   MCH pg 34.6* 34.6* 34.6*   MCHC g/dL 31.9* 32.0 32.1   RDW % 13.0 13.0 12.9   PLATELETS AUTO x10*3/uL 157 116* 113*         CMP:    Results from last 7 days   Lab Units 03/01/24  0500 02/29/24  0331 02/28/24  0540 02/27/24  0752 02/26/24  1233   SODIUM mmol/L 144 144 146*   < > 145   POTASSIUM mmol/L 3.6 4.2 2.9*   < > 2.7*   CHLORIDE mmol/L 106 109* 109*   < > 108*   CO2 mmol/L 31 28 27   < > 26   BUN mg/dL 15 16 13   < > 15   CREATININE mg/dL 1.09* 1.09* 1.03   < > 1.07*   GLUCOSE mg/dL 90 95 90   < > 94   PROTEIN TOTAL g/dL  --   --   --   --  5.2*   CALCIUM mg/dL 8.8 8.3* 7.9*   < > 7.8*   BILIRUBIN TOTAL mg/dL  --   --   --   --  0.6   ALK PHOS U/L  --   --   --   --  81   AST U/L  --   --   --   --  16   ALT U/L  --   --   --   --  23    < > = values in this interval not displayed.       Magnesium:  Results from last 7 days   Lab Units 03/01/24  0500 02/29/24  0331 02/28/24  0540   MAGNESIUM mg/dL 1.60 1.82 1.45*       Troponin:    Results from last 7 days   Lab Units 02/26/24  1417 02/26/24  1233   TROPHS ng/L 17* 18*       INR:  @LABRCNT(INR:3)@  BNP:   Results from last 7 days   Lab Units 02/26/24  1233   BNP pg/mL 401*         IMAGING     Transthoracic Echo (TTE) Complete   Final Result      CT angio chest for pulmonary embolism   Final Result   1. No pulmonary emboli.   2. Small to moderate right pleural effusion.   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     No evidence for DVT within the right lower extremity.     Bilateral calf veins not visualized due to edema.   Findings similar to prior ultrasound of 01/12/2024.   Findings discussed by telephone as a critical result with Dr. Konrad Lewis on 2/26/2024 at 1340 hours.   Signed by Anders Estrada MD      XR chest 1 view   Final Result   1. No acute pulmonary pathology.   2. Left port is unchanged.   Signed by Ildefonso Oneal MD      Cardioversion External    (Results Pending)   Cardioversion External    (Results Pending)      Echocardiogram 2/28/2024:  CONCLUSIONS:   1. Left ventricular systolic function is normal with a 55-60% estimated ejection fraction.   2. The left atrium is mild to moderately dilated.   3. There is no evidence of mitral valve stenosis.   4. Mild to moderate tricuspid regurgitation.   5. The aortic valve appears tricuspid with restriction.   6. There is moderate aortic valve cusp calcification.   7. Moderate aortic valve regurgitation.    Soraya Schneider MD

## 2024-03-01 NOTE — POST-PROCEDURE NOTE
Physician Transition of Care Summary  Invasive Cardiovascular Lab    Procedure Date: 3/1/2024  Attending: Idris Ennis MD  Resident/Fellow/Other Assistant: None    Indications:   Symptomatic atrial fibrillation    Post-procedure diagnosis:   Normal sinus rhythm    Procedure(s):   Direct-current cardioversion    Procedure Findings:   Successful conversion of atrial fibrillation to normal sinus rhythm rate 64    Description of the Procedure:   Patient in a fasting state.  Anesthesia team infused 50 mg of propofol for anesthesia.  O2 saturation remained normal.  Direct-current applied in an AP fashion total dose of 200 J.  Patient woke from anesthesia without complication    Complications:   None      Anticoagulation/Antiplatelet Plan:   To continue anticoagulation    Estimated Blood Loss:   * No surgery found *    Anesthesia: Propofol anesthesia Staff: Arleth RICCI CRNA        Disposition:   Return to medical floor for continued monitoring      Electronically signed by: Idris Ennis MD, 3/1/2024 1:32 PM

## 2024-03-01 NOTE — PROGRESS NOTES
Due for outreach and when calling I noted that she is currently inpatient. Spoke with daugher and patient is expected to be discharged tomorrow. CM will monitor for discharge.    '    Dept: [facility]  Had cardioversion today

## 2024-03-01 NOTE — PRE-SEDATION DOCUMENTATION
Sedation Plan    ASA 3     Mallampati class: I.    Risks, benefits, and alternatives discussed with patient.    Patient has been chronically anticoagulated for well over a month.  Presents with atrial fibrillation.  Plan is for direct-current cardioversion.  Discussed risk and benefits with patient as well as her daughter.  Reviewed that possibilities of direct-current cardioversion include success but then recurrence of atrial fibrillation.  Also possibility of being unsuccessful completely.  Discussed possibility that results in sinus rhythm would be such that pacemaker might be needed in the near future.  Patient or stands risks and agrees to proceed.

## 2024-03-01 NOTE — PROGRESS NOTES
Physical Therapy                 Therapy Communication Note    Patient Name: Laura Woodward  MRN: [id number]  Today's Date: 3/1/2024     Discipline: Physical Therapy    Missed Visit Reason: Missed Visit Reason: Other (Comment) (Pt currently in afib with plan for cardioversion today. Will reattempt when medically appropriate.)    Missed Time: Attempt

## 2024-03-01 NOTE — PROGRESS NOTES
[facility] Heart Progress Note           Rounding STANLEY/Cardiologist:  Jose Inman, APRN-CNP, Dr. Lj Hood  Primary Cardiologist: Dr. Danny Hsu    Date:  3/1/2024  Patient:  Laura Woodward  YOB: 1935  MRN:  [id number]   Admit Date:  2/26/2024      SUBJECTIVE:    3/1/24  Patient is awake and alert and oriented x 3.  She denies chest pain she said she is feeling much better her daughter is at the bedside spoke to him both at length and regarding the cardioversion today  Telemetry is A-fib in the 70s        2/29/2024:  Patient is sitting up in the chair.  States she is feeling better.  She denies chest pain or shortness of breath.  She is anxious about having a cardioversion and wants to talk more with her daughter.    2/28/24  Patient is awake and alert and oriented she sitting at the bedside commode she states that she feels a little bit better this morning  Denies chest pain does get some shortness of breath with exertion  EKG's A-fib no acute changes  Telemetry's A-fib 50s to 70s occasional PVC    2/27/24  Laura Woodward is a 88 y.o.  female patient who is being at the request of Dr. Schneider for inpatient consultation of CHF. She was admitted on 2/26/2024.  Previous [facility] and [facility] records have been reviewed in detail.    Patient with a history of DVT, hypertension, morbid obesity came into the emergency department complaining of cough and congestion and fatigue some palpitations been going on and off for the past 1 month.  Her daughter is at the bedside spoke to her at length she states that she had seen Dr. Hsu back in October she had a hernia when seen for preop clearance she had been doing very well.  She comes in found to be in new onset atrial fibrillation so they kindly asked cardiology to get involved with her case.  She denies fever, chills, PND, orthopnea, claudications.  Positive for shortness of breath with exertion, peripheral edema, palpitations  EKG is  A-fib no acute changes  Troponin 17     Cardiac  history  9/28/23  CONCLUSIONS:  1. Left ventricular systolic function is normal with a 60% estimated ejection fraction.  2. Spectral Doppler shows an impaired relaxation pattern of left ventricular diastolic filling.  3. There is moderate concentric left ventricular hypertrophy.  4. There is no evidence of mitral valve stenosis.  5. Trace mitral valve regurgitation.  6. Moderate tricuspid regurgitation.  7. Moderate aortic valve stenosis.  8. The aortic valve appears tricuspid with restriction.  9. There is moderate aortic valve cusp calcification.  10. Moderately elevated pulmonary artery pressure.     10/2/23  IMPRESSION:  Normal Lexiscan Myoview cardiac perfusion stress test.  No evidence of ischemia or myocardial infarction by perfusion imaging.  Normal left ventricular systolic function, ejection fraction 66%.  Noninvasive risk stratification: Low risk.  No previous available for comparison.      VITALS:     Vitals:    02/29/24 1935 03/01/24 0015 03/01/24 0556 03/01/24 0739   BP: 125/79 138/80 137/80 132/72   Pulse: 75  63 68   Resp: 16 16     Temp: 36.8 °C (98.2 °F) 36.8 °C (98.2 °F)  36.4 °C (97.5 °F)   TempSrc:       SpO2: 93% 94% 95% 96%   Weight:       Height:           Intake/Output Summary (Last 24 hours) at 3/1/2024 1002  Last data filed at 3/1/2024 0500  Gross per 24 hour   Intake --   Output 2600 ml   Net -2600 ml       Wt Readings from Last 4 Encounters:   02/26/24 104 kg (230 lb 6.1 oz)   01/12/24 104 kg (230 lb)   10/19/23 104 kg (229 lb 4.5 oz)   08/30/23 104 kg (230 lb)       CURRENT HOSPITAL MEDICATIONS:   amlodipine-olmesartan, 1 tablet, oral, Daily  apixaban, 5 mg, oral, BID  aspirin, 81 mg, oral, Daily  [START ON 3/2/2024] cloNIDine, 1 patch, transdermal, Weekly  estradiol, 0.01 g, vaginal, Nightly  furosemide, 20 mg, intravenous, BID  icosapent ethyL, 2 g, oral, BID with meals  latanoprost, 1 drop, Both Eyes, Nightly  lenalidomide, 5 mg, oral,  Daily  levothyroxine, 50 mcg, oral, Daily  magnesium oxide, 400 mg, oral, BID  magnesium sulfate, 4 g, intravenous, Once  [START ON 3/2/2024] metoprolol succinate XL, 12.5 mg, oral, Daily  metoprolol tartrate, 12.5 mg, oral, BID  potassium chloride CR, 20 mEq, oral, Daily  [START ON 3/2/2024] spironolactone, 12.5 mg, oral, Daily  timolol, 1 drop, ophthalmic (eye), q12h  valACYclovir, 250 mg, oral, Every Mon/Wed/Fri      sodium chloride 0.9%, 10 mL/hr, Last Rate: 10 mL/hr (03/01/24 0559)      Current Outpatient Medications   Medication Instructions    acetaminophen (TYLENOL) 650 mg, oral, Every 6 hours PRN    amlodipine-olmesartan (Celia) 10-40 mg tablet 1 tablet, oral, Daily RT    apixaban (ELIQUIS) 10 mg, oral, 2 times daily    apixaban (ELIQUIS) 5 mg, oral, 2 times daily    aspirin (NELDA LOW DOSE ASPIRIN) 81 mg, oral, Daily    benzonatate (TESSALON) 100 mg, oral, 3 times daily PRN, Do not crush or chew.    celecoxib (CELEBREX) 400 mg, oral, Daily    cloNIDine (Catapres-TTS) 0.3 mg/24 hr patch PLACE 1 PATCH ON THE SKIN ONCE  WEEKLY    darifenacin (ENABLEX) 15 mg, oral, Daily    docusate sodium (COLACE) 100 mg, oral, 2 times daily    estradiol (ESTRACE) 0.01 g, vaginal, Nightly, 3 x a week    guaiFENesin (MUCINEX) 600 mg, oral, 2 times daily, Do not crush, chew, or split.    L.acidophilus-Bifido.longum (Probiotic Pearls Acidophilus) 1 billion cell capsule,delayed release(DR/EC) 1 capsule, oral, Daily    latanoprost (Xalatan) 0.005 % ophthalmic solution 1 drop, Both Eyes, Nightly    levothyroxine (SYNTHROID, LEVOXYL) 50 mcg, oral, Daily    omega-3 acid ethyl esters (LOVAZA) 1 g, oral, 2 times daily    Revlimid 5 mg, oral, Daily, As directed per oncology    terconazole (Terazol 7) 0.4 % vaginal cream 1 applicator, vaginal, Nightly, Every night for 7 days    timolol (Timoptic) 0.5 % ophthalmic solution 1 drop, ophthalmic (eye), Every 12 hours    traMADol (ULTRAM) 50 mg, oral, Every 8 hours PRN    trospium (SANCTURA) 20  mg, oral, Every 12 hours    valACYclovir (VALTREX) 500 mg, oral, Daily, 1/2 tablet twice daily 3 times a week. M,W,F     vitamin E 400 Units, oral, Daily        PHYSICAL EXAMINATION:   GENERAL:  Well developed, well nourished, in no acute distress.  CHEST:  Symmetric and nontender.  NEURO/PSYCH:  Alert and oriented times three with approppriate behavior and responses.  NECK:  Supple, no JVD, no bruit.  LUNGS:  Clear to auscultation bilaterally, normal respiratory effort.  HEART:  s1,  s2 irregular       There are no rubs, clicks or heaves.  EXTREMITIES:  1  plus  pulses. 3+ plus edema      LAB DATA:     CBC:   Results from last 7 days   Lab Units 03/01/24  0500 02/28/24  0542 02/27/24  0752   WBC AUTO x10*3/uL 4.0* 2.7* 2.6*   RBC AUTO x10*6/uL 3.55* 3.27* 3.38*   HEMOGLOBIN g/dL 12.3 11.3* 11.7*   HEMATOCRIT % 38.6 35.3* 36.5   MCV fL 109* 108* 108*   MCH pg 34.6* 34.6* 34.6*   MCHC g/dL 31.9* 32.0 32.1   RDW % 13.0 13.0 12.9   PLATELETS AUTO x10*3/uL 157 116* 113*     CMP:    Results from last 7 days   Lab Units 03/01/24  0500 02/29/24  0331 02/28/24  0540 02/27/24  0752 02/26/24  1233   SODIUM mmol/L 144 144 146*   < > 145   POTASSIUM mmol/L 3.6 4.2 2.9*   < > 2.7*   CHLORIDE mmol/L 106 109* 109*   < > 108*   CO2 mmol/L 31 28 27   < > 26   BUN mg/dL 15 16 13   < > 15   CREATININE mg/dL 1.09* 1.09* 1.03   < > 1.07*   GLUCOSE mg/dL 90 95 90   < > 94   PROTEIN TOTAL g/dL  --   --   --   --  5.2*   CALCIUM mg/dL 8.8 8.3* 7.9*   < > 7.8*   BILIRUBIN TOTAL mg/dL  --   --   --   --  0.6   ALK PHOS U/L  --   --   --   --  81   AST U/L  --   --   --   --  16   ALT U/L  --   --   --   --  23    < > = values in this interval not displayed.     BMP:    Results from last 7 days   Lab Units 03/01/24  0500 02/29/24  0331 02/28/24  0540   SODIUM mmol/L 144 144 146*   POTASSIUM mmol/L 3.6 4.2 2.9*   CHLORIDE mmol/L 106 109* 109*   CO2 mmol/L 31 28 27   BUN mg/dL 15 16 13   CREATININE mg/dL 1.09* 1.09* 1.03   CALCIUM mg/dL 8.8  8.3* 7.9*   GLUCOSE mg/dL 90 95 90     Magnesium:  Results from last 7 days   Lab Units 03/01/24  0500 02/29/24  0331 02/28/24  0540   MAGNESIUM mg/dL 1.60 1.82 1.45*     Troponin:    Results from last 7 days   Lab Units 02/26/24  1417 02/26/24  1233   TROPHS ng/L 17* 18*     BNP:   Results from last 7 days   Lab Units 02/26/24  1233   BNP pg/mL 401*       DIAGNOSTIC TESTING:     ECG 12 lead  Result Date: 2/27/2024    Atrial fibrillation with slow ventricular response Low voltage QRS ST & T wave abnormality, consider anterior ischemia Abnormal ECG When compared with ECG of 11-MAY-2022 17:08, Atrial fibrillation has replaced Sinus rhythm Inverted T waves have replaced nonspecific T wave abnormality in Inferior leads T wave inversion more evident in Anterior leads See ED provider note for full interpretation and clinical correlation Confirmed by Soraya Schneider ([id number]) on 2/27/2024 9:34:42 AM      RADIOLOGY:     Transthoracic Echo (TTE) Complete   Final Result      CT angio chest for pulmonary embolism   Final Result   1. No pulmonary emboli.   2. Small to moderate right pleural effusion.   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     No evidence for DVT within the right lower extremity.     Bilateral calf veins not visualized due to edema.   Findings similar to prior ultrasound of 01/12/2024.   Findings discussed by telephone as a critical result with Dr. Konrad Lewis on 2/26/2024 at 1340 hours.   Signed by Anders Estrada MD      XR chest 1 view   Final Result   1. No acute pulmonary pathology.   2. Left port is unchanged.   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)    Essential hypertension    Hypothyroidism    Lymphedema of left lower extremity    Osteoporosis    Chronic venous stasis    Insomnia    Hemorrhoids    Obesity (BMI 30.0-34.9)    Depression, major, single episode, mild (CMS/HCC)    Periumbilical hernia    Stage 3a chronic kidney disease (CKD) (CMS/HCC)    Pre-op examination    Encounter for immunization    Ventral hernia, recurrent    Acute deep vein thrombosis (DVT) of femoral vein of left lower extremity (CMS/HCC)    Pain in right buttock    Hypokalemia    Hypomagnesemia    Generalized pain    On palliative antineoplastic chemotherapy    Anxiety and depression    Atrial fibrillation (CMS/HCC)    Atrial flutter, unspecified type (CMS/HCC)       ASSESSMENT:   New onset atrial fibrillation  Acute on chronic diastolic heart failure NYHA class II  Pleural effusion  History of DVT on Eliquis  Morbid obesity  Hypokalemia  Hypomagnesia  Hypertension      PLAN:    Tele  monitoring  Check echo  Continue the oral anticoagulation Eliquis 5 mg p.o. twice daily  Potassium 40 mEq every 4 hours x 3 doses  Magnesium 4 g IVPBx 1 recheck level in a.m.  Lasix 20 mg IV push every 12  Daily weights  Strict intake and output  Lopressor 12.5 mg p.o. twice daily    Further recommendations per Dr Erwin Inman CNP  [facility]    Of note, this documentation is completed using the Dragon Dictation system (voice recognition software). There may be spelling and/or grammatical errors that were not corrected prior to final submission.    Please do not hesitate to call with questions.  Electronically signed by Jose Inman, APRN-CNP, on 3/1/2024 at 10:02 AM     2/28/2024:  She looks and feels fine.  She denies chest pain or shortness of breath.  Vital signs are stable.  Heart rates are in the 60s.  Remains in atrial fibrillation.  Approximate 1 L negative fluid balance yesterday.  Cardiac  rhythm is irregularly irregular.  Lungs are clear.  Moderate bilateral peripheral edema.  Hemoglobin is 11.3.  Sodium is 146 with potassium 2.9.  Normal BUN and creatinine.  Magnesium is 1.45.  Potassium and magnesium are being supplemented.  We discussed risks and benefits of elective cardioversion once electrolytes are adequately supplemented.  She is agreeable to proceed but currently prefers this to be scheduled for March 1, 2024.      2/29/2024:  Patient is sitting up in the chair.  States she is feeling better.  She denies chest pain or shortness of breath.  States her legs feel heavy.  She does not want to go home until her edema significantly improved.  She is anxious about having a cardioversion and wants to talk more with her daughter.  Vital signs are stable with exception of mildly elevated systolic blood pressure.  Heart rate 60s to 70s.  900 cc negative fluid balance yesterday.  Weight is stable.  Cardiac rhythm is irregularly irregular.  Lungs clear.  3+ bilateral peripheral edema as well as apparent lymphedema.  Basic metabolic profile is normal with exception of chloride 109 and creatinine 1.09.  Potassium is now 4.2.  Magnesium was 1.82.  She remains in atrial fibrillation. Continue same cardiac medications including furosemide 20 mg IV BID.  Patient states her preference to delay cardioversion until tomorrow am.     3/1/24  Telemetry monitoring  Spoke at length with family plan for cardioversion today well informed of risk versus benefits would like to proceed with procedure  Keep magnesium greater than 2.0  Keep potassium between 4.0 and 4.5    Further recommendations per Dr Hood    I have personally interviewed and examined the patient.   I have personally and independently reviewed labs and diagnostic testing.  I have personally verified the elements of the history and physical listed above and changes, if any, are noted.   I have personally reviewed the assessment and plan as documented by  Jose Inman, APNR, CNP and concur.    She is resting comfortably.  States she feels well.  No chest pain or shortness of breath.  Vital signs are stable.  2.6 L negative fluid balance yesterday.  Cardiac rhythm is irregularly irregular.  Lungs are clear.  3+ bilateral peripheral edema/lymphedema.  Hemoglobin is 12.3.  Sodium 144 with potassium 3.6.  BUN 15 and creatinine 1.09.  She remains in atrial fibrillation with controlled ventricular response.  She continues to express concern about the right lower extremity edema.  She presented with acute diastolic congestive heart failure in the setting of underlying valvular heart disease.  Moderately severe aortic stenosis.  She is scheduled undergo elective cardioversion today.  Will change metoprolol to tartrate to Toprol-XL.  Agree with titrating Aldactone upward.  She unfortunately is highly prone to urinary tract infections per her daughter.  In light of this Jardiance may not be an ideal option but can be considered.  Further recommendations to follow.

## 2024-03-02 ENCOUNTER — APPOINTMENT (OUTPATIENT)
Dept: CARDIOLOGY | Facility: HOSPITAL | Age: 89
DRG: 308 | End: 2024-03-02
Payer: MEDICARE

## 2024-03-02 ENCOUNTER — HOME HEALTH ADMISSION (OUTPATIENT)
Dept: HOME HEALTH SERVICES | Facility: HOME HEALTH | Age: 89
End: 2024-03-02
Payer: MEDICARE

## 2024-03-02 VITALS
TEMPERATURE: 98.2 F | DIASTOLIC BLOOD PRESSURE: 58 MMHG | SYSTOLIC BLOOD PRESSURE: 111 MMHG | HEART RATE: 66 BPM | RESPIRATION RATE: 16 BRPM | WEIGHT: 230.38 LBS | BODY MASS INDEX: 34.12 KG/M2 | HEIGHT: 69 IN | OXYGEN SATURATION: 96 %

## 2024-03-02 LAB
ANION GAP SERPL CALC-SCNC: 9 MMOL/L (ref 10–20)
ATRIAL RATE: 53 BPM
ATRIAL RATE: 62 BPM
BUN SERPL-MCNC: 23 MG/DL (ref 6–23)
CALCIUM SERPL-MCNC: 8.6 MG/DL (ref 8.6–10.3)
CHLORIDE SERPL-SCNC: 104 MMOL/L (ref 98–107)
CO2 SERPL-SCNC: 33 MMOL/L (ref 21–32)
CREAT SERPL-MCNC: 1.41 MG/DL (ref 0.5–1.05)
EGFRCR SERPLBLD CKD-EPI 2021: 36 ML/MIN/1.73M*2
ERYTHROCYTE [DISTWIDTH] IN BLOOD BY AUTOMATED COUNT: 13.2 % (ref 11.5–14.5)
GLUCOSE SERPL-MCNC: 94 MG/DL (ref 74–99)
HCT VFR BLD AUTO: 35.3 % (ref 36–46)
HGB BLD-MCNC: 11.4 G/DL (ref 12–16)
MAGNESIUM SERPL-MCNC: 2.16 MG/DL (ref 1.6–2.4)
MCH RBC QN AUTO: 35.3 PG (ref 26–34)
MCHC RBC AUTO-ENTMCNC: 32.3 G/DL (ref 32–36)
MCV RBC AUTO: 109 FL (ref 80–100)
NRBC BLD-RTO: 0 /100 WBCS (ref 0–0)
P AXIS: 72 DEGREES
P OFFSET: 166 MS
P ONSET: 108 MS
PLATELET # BLD AUTO: 166 X10*3/UL (ref 150–450)
POTASSIUM SERPL-SCNC: 3.7 MMOL/L (ref 3.5–5.3)
PR INTERVAL: 232 MS
Q ONSET: 221 MS
Q ONSET: 224 MS
QRS COUNT: 10 BEATS
QRS COUNT: 10 BEATS
QRS DURATION: 82 MS
QRS DURATION: 84 MS
QT INTERVAL: 446 MS
QT INTERVAL: 468 MS
QTC CALCULATION(BAZETT): 437 MS
QTC CALCULATION(BAZETT): 475 MS
QTC FREDERICIA: 440 MS
QTC FREDERICIA: 473 MS
R AXIS: 43 DEGREES
R AXIS: 46 DEGREES
RBC # BLD AUTO: 3.23 X10*6/UL (ref 4–5.2)
SODIUM SERPL-SCNC: 142 MMOL/L (ref 136–145)
T AXIS: -4 DEGREES
T AXIS: 19 DEGREES
T OFFSET: 444 MS
T OFFSET: 458 MS
VENTRICULAR RATE: 58 BPM
VENTRICULAR RATE: 62 BPM
WBC # BLD AUTO: 3.4 X10*3/UL (ref 4.4–11.3)

## 2024-03-02 PROCEDURE — 80048 BASIC METABOLIC PNL TOTAL CA: CPT | Performed by: INTERNAL MEDICINE

## 2024-03-02 PROCEDURE — 2500000002 HC RX 250 W HCPCS SELF ADMINISTERED DRUGS (ALT 637 FOR MEDICARE OP, ALT 636 FOR OP/ED): Performed by: INTERNAL MEDICINE

## 2024-03-02 PROCEDURE — 93010 ELECTROCARDIOGRAM REPORT: CPT | Performed by: INTERNAL MEDICINE

## 2024-03-02 PROCEDURE — 99239 HOSP IP/OBS DSCHRG MGMT >30: CPT | Performed by: INTERNAL MEDICINE

## 2024-03-02 PROCEDURE — 99232 SBSQ HOSP IP/OBS MODERATE 35: CPT | Performed by: INTERNAL MEDICINE

## 2024-03-02 PROCEDURE — 83735 ASSAY OF MAGNESIUM: CPT | Performed by: INTERNAL MEDICINE

## 2024-03-02 PROCEDURE — 85027 COMPLETE CBC AUTOMATED: CPT | Performed by: INTERNAL MEDICINE

## 2024-03-02 PROCEDURE — 2500000001 HC RX 250 WO HCPCS SELF ADMINISTERED DRUGS (ALT 637 FOR MEDICARE OP): Performed by: INTERNAL MEDICINE

## 2024-03-02 PROCEDURE — 93005 ELECTROCARDIOGRAM TRACING: CPT

## 2024-03-02 PROCEDURE — 2500000004 HC RX 250 GENERAL PHARMACY W/ HCPCS (ALT 636 FOR OP/ED): Performed by: INTERNAL MEDICINE

## 2024-03-02 RX ORDER — TORSEMIDE 10 MG/1
10 TABLET ORAL
Qty: 12 TABLET | Refills: 0 | Status: SHIPPED | OUTPATIENT
Start: 2024-03-04 | End: 2024-03-08 | Stop reason: SDUPTHER

## 2024-03-02 RX ORDER — METOPROLOL SUCCINATE 25 MG/1
12.5 TABLET, EXTENDED RELEASE ORAL DAILY
Qty: 15 TABLET | Refills: 0 | Status: SHIPPED | OUTPATIENT
Start: 2024-03-03 | End: 2024-03-08 | Stop reason: SDUPTHER

## 2024-03-02 RX ORDER — LANOLIN ALCOHOL/MO/W.PET/CERES
400 CREAM (GRAM) TOPICAL DAILY
Qty: 30 TABLET | Refills: 0 | Status: SHIPPED | OUTPATIENT
Start: 2024-03-02 | End: 2024-03-21 | Stop reason: SDUPTHER

## 2024-03-02 RX ORDER — TORSEMIDE 20 MG/1
10 TABLET ORAL
Status: DISCONTINUED | OUTPATIENT
Start: 2024-03-04 | End: 2024-03-02 | Stop reason: HOSPADM

## 2024-03-02 RX ORDER — SPIRONOLACTONE 25 MG/1
12.5 TABLET ORAL DAILY
Qty: 15 TABLET | Refills: 0 | Status: SHIPPED | OUTPATIENT
Start: 2024-03-03 | End: 2024-03-08 | Stop reason: SDUPTHER

## 2024-03-02 RX ADMIN — APIXABAN 5 MG: 5 TABLET, FILM COATED ORAL at 08:01

## 2024-03-02 RX ADMIN — SPIRONOLACTONE 12.5 MG: 25 TABLET ORAL at 08:01

## 2024-03-02 RX ADMIN — ICOSAPENT ETHYL 2 G: 1 CAPSULE ORAL at 07:57

## 2024-03-02 RX ADMIN — MAGNESIUM OXIDE 400 MG (241.3 MG MAGNESIUM) TABLET 400 MG: TABLET at 08:01

## 2024-03-02 RX ADMIN — CLONIDINE 1 PATCH: 0.3 PATCH TRANSDERMAL at 08:01

## 2024-03-02 RX ADMIN — ASPIRIN 81 MG: 81 TABLET, COATED ORAL at 08:01

## 2024-03-02 RX ADMIN — FUROSEMIDE 20 MG: 10 INJECTION, SOLUTION INTRAMUSCULAR; INTRAVENOUS at 08:00

## 2024-03-02 RX ADMIN — AMLODIPINE AND OLMESARTAN MEDOXOMIL 1 TABLET: 10; 40 TABLET ORAL at 07:57

## 2024-03-02 RX ADMIN — TIMOLOL MALEATE 1 DROP: 5 SOLUTION/ DROPS OPHTHALMIC at 08:48

## 2024-03-02 RX ADMIN — LEVOTHYROXINE SODIUM 50 MCG: 50 TABLET ORAL at 06:12

## 2024-03-02 RX ADMIN — POTASSIUM CHLORIDE 20 MEQ: 1500 TABLET, EXTENDED RELEASE ORAL at 08:01

## 2024-03-02 RX ADMIN — METOPROLOL SUCCINATE 12.5 MG: 25 TABLET, EXTENDED RELEASE ORAL at 08:01

## 2024-03-02 ASSESSMENT — COGNITIVE AND FUNCTIONAL STATUS - GENERAL
HELP NEEDED FOR BATHING: A LITTLE
WALKING IN HOSPITAL ROOM: A LITTLE
MOVING FROM LYING ON BACK TO SITTING ON SIDE OF FLAT BED WITH BEDRAILS: A LITTLE
MOBILITY SCORE: 16
TURNING FROM BACK TO SIDE WHILE IN FLAT BAD: A LITTLE
DAILY ACTIVITIY SCORE: 19
PERSONAL GROOMING: A LITTLE
MOVING TO AND FROM BED TO CHAIR: A LITTLE
TOILETING: A LITTLE
CLIMB 3 TO 5 STEPS WITH RAILING: TOTAL
DRESSING REGULAR UPPER BODY CLOTHING: A LITTLE
DRESSING REGULAR LOWER BODY CLOTHING: A LITTLE
STANDING UP FROM CHAIR USING ARMS: A LITTLE

## 2024-03-02 ASSESSMENT — PAIN SCALES - GENERAL
PAINLEVEL_OUTOF10: 0 - NO PAIN
PAINLEVEL_OUTOF10: 0 - NO PAIN

## 2024-03-02 NOTE — PROGRESS NOTES
Laura Woodward is a 88 y.o. female on day 4 of admission presenting with Atrial fibrillation (CMS/[facility]).      ASSESSMENT/PLAN   Principal Problem:  -New onset atrial fibrillation, rate is controlled.  In the setting of low magnesium and potassium, both now replaced and remains in A-fib.  Patient is already on anticoagulation with apixaban due to her recent DVT, plan is for DCCV this afternoon    Active Problems:  -Hypokalemia-finally replaced with high doses of KCl, starting spironolactone.  Potassium down to 3.6 this morning, giving more oral and increasing her spironolactone with goal of >4.0.  -Hypomagnesia-finally replaced after IV and oral, up to 1.60 today.  Continuing oral magnesium oxide.  -Acute CHF with edema, small right pleural effusion-probably due to A-fib, possibly also diastolic and due to her known pulmonary HTN and valvular heart disease.  Continuing to diurese, -5.5 L this admission.  Echocardiogram with normal LVEF, mild TR, moderate AR.  -Recent DVT-on apixaban.    -Valvular heart disease with moderate aortic stenosis and moderate TR on prior echocardiogram, this admission with mild TR and moderate AR.  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  Creatinine up slightly today to 1.09, furosemide decreased to once daily  -History of mild aortic stenosis    SUBJECTIVE/OBJECTIVE   03/02/24   -Did not have any problems with the DCCV yesterday, thinks she actually feels a little better today.  Denies shortness of breath, no chest pains.  -Daughter is in the room with her-we discussed atrial fibrillation again, monitoring her potassium, continuing with gentle diuresis after discharge.  -She is afebrile, blood pressures are good.  Satting 96% on room air.  -On telemetry now in [facility].  -I&O's show she  is now -7.4 L.  -Chest is clear to auscultation  -Cardiac exam with a regular rhythm  -Extremities with edema, minimally changed.  -Chemistry panel with a blood sugar 94, sodium 142 with a potassium of 3.7.  Chloride 104, bicarb 33.  -BUN 23 with a creatinine up to 1.41-will discontinue her IV furosemide.  -Magnesium 2.16.  -CBC with a WBC of 3.4, H/H11.4/35.3.  Platelet count 166 K.  -She underwent successful DC cardioversion yesterday afternoon.  -Cardiology note appreciated-okay to discharge today.    3/1/2024  -Feels okay, waiting to go for her DCCV.  Does not feel overly anxious.  -Daughter, patient and I discussed with her treatment, reviewed her lab results.  Their questions were answered.  I also discussed with them that she may have chronic edema of her left leg due to the blood clot, daughter is asking if the blood clot is still there-I again told them that we do not check as long as she is on anticoagulation, and that the body will either reabsorb it or it will at least become attached to the blood vessels so it would not go to the lungs.  -She is afebrile, blood pressures are good.  Satting 96% on room air.  -Remains in A-fib with a controlled rate on telemetry this morning.  -I&O shows that she is now -5.5 L this admission, put out 7.6 L of urine yesterday.  -Chest is clear to auscultation  -Cardiac exam is an irregularly irregular rhythm with a controlled rate  -Her right lower extremity edema seems to be a little softer  -Chemistry panel with a sodium of 144, potassium down to 3.6-will give more oral replacement with a goal of >4.0.  Also increasing her spironolactone to daily.  Chloride 106, bicarb 31.  -BUN 15 with a creatinine stable at 1.09.  -Magnesium level 1.60-will increase her oral replacement with a goal of >2.0.  -CBC with a WBC up to 4.0, H/H up to 12.3/38.6.  Platelet count 157 K.      2/29/2024  -Patient says she feels fine, denies any chest pains, no palpitations.  Daughter is in the  "room with her.  -She is complaining that she still has lots of edema \"I cannot wear my shoes\".  I did discuss with both of them that it may take several weeks for the edema to fully go away and that part of it is due to her lymphedema.  May also have a postphlebitic edema on the left, but she did not have a clot on the right which is the leg that she mainly complains about.  -Discussed with them DCCV, and how converting back to NSR early is the present goal.  They understand DCCV, agree.  -She is afebrile, blood pressures are fairly well-controlled.  Satting 95% on room air.  -Remains in atrial fibrillation with a controlled rate on telemetry.  -I&O's show she is -2.9 L thus far.  -Chest is clear to auscultation  -Cardiac exam with an irregularly irregular rhythm, controlled rate  -Extremities with 2+ pitting edema along with her lymphedema both legs.  -Chemistry panel with her potassium now normalized after intensive replacement and starting spironolactone, 4.2 today.  -BUN 16 with a creatinine of 1.09.  Will decrease her IV furosemide to once daily.  -Magnesium back to normal at 1.82.  -Echocardiogram with a normal LVEF, left atrium mild to moderately dilated, no mitral stenosis, mild tricuspid regurgitation, moderate aortic valve cusp clarifications with moderate aortic valve regurgitation.    2/28/2024  -Patient says she is feeling good, no significant shortness of breath.  Her daughter is in the room with her, has many questions/concerns which we discussed.  -Daughter has questions about whether the clot will go away or not-I discussed with her the natural history of blood clots, noting that they sometimes will dissolve on their own, other times will still be present but will adhere to the wall of the vessel so not break off and go to the lungs.  -She is alert, in NAD.  -She is afebrile, vital signs stable.  Satting 95% on room air.  -On telemetry she remains in atrial fibrillation with a controlled " rate  -I&O-she is -910 cc since admission  -Some basilar crackles bilaterally  -Irregularly irregular with a controlled rate  -No significant change in her right lower extremity edema.  -Chemistry panel shows she is still hypokalemic with a potassium of 2.9-will give IV and oral replacement, start spironolactone.  -BUN 13 with a creatinine of 1.03.  -CBC with a WBC of 2.7, H/H stable at 11.3/35.3.  Platelet count remains low but stable at 116 K  -Echocardiogram just done this morning-patient refused to do it yesterday because she was going to miss her breakfast!  Was complaining again to the echo tech that she would miss her breakfast this morning!  -Cardiology consult appreciated-Dr. Hood spoke to the daughter a possible DCCV.  I discussed at length with both the daughter and the patient what that would entail, the importance of doing it get sooner rather than later to try and keep her in NSR, etc.  We also discussed at length her profound potassium deficit, most likely from the diet, how it can be replaced, etc.    *These notes are being done using Dragon voice recognition technology and may include unintended errors with respect to translation of words, typographical errors or grammar errors which may not have been identified prior to finalization of the chart note.    CURRENT MEDICATIONS     Scheduled Medications:    Current Facility-Administered Medications:     acetaminophen (Tylenol) tablet 650 mg, 650 mg, oral, q4h PRN, Soraya Schneider MD, 650 mg at 02/27/24 2118    alum-mag hydroxide-simeth (Mylanta) 200-200-20 mg/5 mL oral suspension 30 mL, 30 mL, oral, 4x daily PRN, Soraya Schneider MD    amlodipine-olmesartan (Celia) 10-40 mg per tablet 1 tablet, 1 tablet, oral, Daily, Soraya Schneider MD, 1 tablet at 03/02/24 0757    apixaban (Eliquis) tablet 5 mg, 5 mg, oral, BID, Soraya Schneider MD, 5 mg at 03/02/24 0801    aspirin EC tablet 81 mg, 81 mg, oral, Daily, Soraya Schneider MD, 81 mg at 03/02/24 0801    cloNIDine (Catapres-TTS)  0.3 mg/24 hr patch 1 patch, 1 patch, transdermal, Weekly, Soarya Schneider MD, 1 patch at 03/02/24 0801    estradiol (Estrace) 0.01 % (0.1 mg/gram) vaginal cream 0.01 g, 0.01 g, vaginal, Nightly, Soraya Schneider MD    furosemide (Lasix) injection 20 mg, 20 mg, intravenous, BID, Soraya Schneider MD, 20 mg at 03/02/24 0800    icosapent ethyL (Vascepa) capsule 2 g, 2 g, oral, BID with meals, Soraya Schneider MD, 2 g at 03/02/24 0757    latanoprost (Xalatan) 0.005 % ophthalmic solution 1 drop, 1 drop, Both Eyes, Nightly, Soraya Schneider MD, 1 drop at 03/01/24 2054    lenalidomide (Revlimid) capsule 5 mg, 5 mg, oral, Daily, Soraya Schneider MD    levothyroxine (Synthroid, Levoxyl) tablet 50 mcg, 50 mcg, oral, Daily, Soraya Schneider MD, 50 mcg at 03/02/24 0612    magnesium hydroxide (Milk of Magnesia) 2,400 mg/10 mL suspension 10 mL, 10 mL, oral, Daily PRN, Soraya Schneider MD    magnesium oxide (Mag-Ox) tablet 400 mg, 400 mg, oral, BID, Soraya Schneider MD, 400 mg at 03/02/24 0801    melatonin tablet 5 mg, 5 mg, oral, Nightly PRN, Soraya Schneider MD, 5 mg at 02/29/24 2220    metoprolol succinate XL (Toprol-XL) 24 hr tablet 12.5 mg, 12.5 mg, oral, Daily, Lj Hood MD, 12.5 mg at 03/02/24 0801    metoprolol tartrate (Lopressor) tablet 12.5 mg, 12.5 mg, oral, BID, Lj Hood MD, 12.5 mg at 03/01/24 0821    ondansetron (Zofran) injection 4 mg, 4 mg, intravenous, q4h PRN, Soraya Schneider MD    oxygen (O2) therapy, , , Continuous PRN, Idris Ennis MD, Last Rate: 180,000 mL/hr at 03/01/24 1314, 3 L/min at 03/01/24 1314    potassium chloride CR (Klor-Con M20) ER tablet 20 mEq, 20 mEq, oral, Daily, Soraya Schneider MD, 20 mEq at 03/02/24 0801    sodium chloride 0.9% infusion, 10 mL/hr, intravenous, Continuous, Ashlee Ramirez, APRN-CNP, Last Rate: 10 mL/hr at 03/01/24 1324, Continued by Anesthesia at 03/01/24 1324    spironolactone (Aldactone) tablet 12.5 mg, 12.5 mg, oral, Daily, Soraya Schneider MD, 12.5 mg at 03/02/24 0801    timolol (Timoptic) 0.5 % ophthalmic solution  "1 drop, 1 drop, ophthalmic (eye), q12h, Soraya Schneider MD, 1 drop at 03/01/24 2049    valACYclovir (Valtrex) tablet 250 mg, 250 mg, oral, Every Mon/Wed/Fri, Soraya Schneider MD, 250 mg at 02/28/24 0939     PRN Medications:  PRN medications   Medication    acetaminophen    alum-mag hydroxide-simeth    magnesium hydroxide    melatonin    ondansetron    oxygen         IVs:  oxygen, , Last Rate: 3 L/min (03/01/24 1314)  sodium chloride 0.9%, 10 mL/hr, Last Rate: 10 mL/hr (03/01/24 1324)         I&Os     Intake/Output last 3 Shifts:  I/O last 3 completed shifts:  In: 116.4 (1.1 mL/kg) [I.V.:116.4 (1.1 mL/kg)]  Out: 4575 (43.8 mL/kg) [Urine:4575 (1.2 mL/kg/hr)]  Weight: 104.5 kg     VITAL SIGNS     Blood pressure 111/58, pulse 66, temperature 36.8 °C (98.2 °F), resp. rate 16, height 1.753 m (5' 9\"), weight 104 kg (230 lb 6.1 oz), SpO2 96 %.     PHYSICAL EXAM   Physical exam:  -General appearance: Pleasant elderly white female, sitting up in a chair, alert and in NAD.  Daughter is in the room with her.  -Vital signs:  As above  -HEENT: No icterus  -Neck: No adenopathy  -Chest: Lungs areclear  -Cardiac: Regular rhythm, 2/3 systolic murmur  -Abdomen: Bowel sounds active  -Extremities: Still with bilateral lower extremity edema  -Skin: No rash  -Neurologic:   Patient is alert and oriented x3.   -Behavior/Emotional:  Appropriate, cooperative    LABS   Relevant Results:  Results from last 7 days   Lab Units 03/02/24 0419 03/01/24  0500 02/29/24  0331   GLUCOSE mg/dL 94 90 95        HbA1c:  No results found for: \"HGBA1C\"  CBC:   Results from last 7 days   Lab Units 03/02/24  0419 03/01/24  0500 02/28/24  0542   WBC AUTO x10*3/uL 3.4* 4.0* 2.7*   RBC AUTO x10*6/uL 3.23* 3.55* 3.27*   HEMOGLOBIN g/dL 11.4* 12.3 11.3*   HEMATOCRIT % 35.3* 38.6 35.3*   MCV fL 109* 109* 108*   MCH pg 35.3* 34.6* 34.6*   MCHC g/dL 32.3 31.9* 32.0   RDW % 13.2 13.0 13.0   PLATELETS AUTO x10*3/uL 166 157 116*         CMP:    Results from last 7 days   Lab " Units 03/02/24  0419 03/01/24  0500 02/29/24  0331 02/27/24  0752 02/26/24  1233   SODIUM mmol/L 142 144 144   < > 145   POTASSIUM mmol/L 3.7 3.6 4.2   < > 2.7*   CHLORIDE mmol/L 104 106 109*   < > 108*   CO2 mmol/L 33* 31 28   < > 26   BUN mg/dL 23 15 16   < > 15   CREATININE mg/dL 1.41* 1.09* 1.09*   < > 1.07*   GLUCOSE mg/dL 94 90 95   < > 94   PROTEIN TOTAL g/dL  --   --   --   --  5.2*   CALCIUM mg/dL 8.6 8.8 8.3*   < > 7.8*   BILIRUBIN TOTAL mg/dL  --   --   --   --  0.6   ALK PHOS U/L  --   --   --   --  81   AST U/L  --   --   --   --  16   ALT U/L  --   --   --   --  23    < > = values in this interval not displayed.       Magnesium:  Results from last 7 days   Lab Units 03/02/24  0419 03/01/24  0500 02/29/24  0331   MAGNESIUM mg/dL 2.16 1.60 1.82       Troponin:    Results from last 7 days   Lab Units 02/26/24  1417 02/26/24  1233   TROPHS ng/L 17* 18*       INR:  @LABRCNT(INR:3)@  BNP:   Results from last 7 days   Lab Units 02/26/24  1233   BNP pg/mL 401*         IMAGING     Cardioversion External   Final Result      Transthoracic Echo (TTE) Complete   Final Result      CT angio chest for pulmonary embolism   Final Result   1. No pulmonary emboli.   2. Small to moderate right pleural effusion.   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     No evidence for DVT within the right lower extremity.     Bilateral calf veins not visualized due to edema.   Findings similar to prior ultrasound of 01/12/2024.   Findings discussed by telephone as a critical result with Dr. Konrad Lewis on 2/26/2024 at 1340 hours.   Signed by Anders Estrada MD      XR chest 1 view   Final Result   1. No acute pulmonary pathology.   2. Left port is unchanged.   Signed by Ildefonso Oneal MD      Cardioversion External    (Results Pending)      Echocardiogram 2/28/2024:  CONCLUSIONS:   1. Left ventricular  systolic function is normal with a 55-60% estimated ejection fraction.   2. The left atrium is mild to moderately dilated.   3. There is no evidence of mitral valve stenosis.   4. Mild to moderate tricuspid regurgitation.   5. The aortic valve appears tricuspid with restriction.   6. There is moderate aortic valve cusp calcification.   7. Moderate aortic valve regurgitation.    Soraya Schneider MD

## 2024-03-02 NOTE — DISCHARGE SUMMARY
DISCHARGE SUMMARY      Laura Woodward  Age:  88 y.o. female   :  1935  MRN:  [id number]    24    Date of admission:  2024     Date of discharge:  24     Attending physician at discharge:  Soraya Schneider MD     Discharge Diagnoses:  Atrial fibrillation (CMS/[facility])    Hospital Course:  Laura Woodward is a 88 y.o. 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  She presented to the ER with increasing swelling of her lower legs after being treated for an upper respiratory infection.  Her left leg had recently been diagnosed with a blood clot for which she was on Eliquis/apixaban, and now her right leg was swelling and uncomfortable.  She denied palpitations or chest pains.  She was on a keto diet by her daughter.  In the ER her chemistry panel showed hypokalemia with a potassium of 2.7, hypomagnesemia with a magnesium of 1.29 (was normal on 2024), electrolytes were otherwise normal.  RFT's and LFTs unremarkable.  BNP was elevated at 401, troponins were mildly elevated but in a level pattern.  TSH was 1.26.  CBC unremarkable other than a platelet count of 105K.  Influenza A, RSV, and COVID-19 were negative.  CXR with no significant disease, CTA showed no pulmonary embolus, she had a small to moderate right pleural effusion.  EKGs showed atrial fibrillation with a controlled rate (new from last EKG in 2022).  Patient received potassium and magnesium replacement and was admitted for further workup and treatment.    Potassium and magnesium were replaced, requiring large amounts and eventual addition of spironolactone.  Most likely the cause of her low potassium and magnesium were due to the keto diet as she was not on diuretics PTA.  Her CHF was treated with IV furosemide.   Echocardiogram on 2/28 with a normal LVEF, mild to moderately dilated LA, mild TR, moderate AR.  She was seen in consultation by cardiology, and underwent a DC cardioversion on 3/1/2024.  Patient did diurese 5.5 L during admission, she still had lower extremity edema bilaterally L >R, much of which is due to her chronic lymphedema, the right lower extremity edema was improving some.    She is discharged in improved condition with [facility] to help monitor her heart rate & CHF.  Repeat BMP and magnesium levels ordered to be drawn by [facility] in 1 week.  She was advised to avoid a keto diet to prevent further hypokalemia and hypomagnesemia.  She will continue to use her compression hose both lower extremities.  She should follow-up with her PCP, Dr. Ned Estrada after discharge.  She will follow-up with cardiology, Dr. Hsu on 8 March.    Procedures:  DC cardioversion to NSR on 3/1/2024    Problem list:  Problem List Items Addressed This Visit          Cardiac and Vasculature    Essential hypertension    Relevant Medications    spironolactone (Aldactone) 25 mg tablet (Start on 3/3/2024)    * (Principal) Atrial fibrillation (CMS/HCC)    Relevant Medications    metoprolol succinate XL (Toprol-XL) 24 hr tablet 12.5 mg    metoprolol succinate XL (Toprol-XL) 25 mg 24 hr tablet (Start on 3/3/2024)    torsemide (Demadex) 10 mg tablet (Start on 3/4/2024)    Other Relevant Orders    Cardioversion External    Transthoracic Echo (TTE) Complete (Completed)    Referral to Home Health    Atrial flutter, unspecified type (CMS/HCC) - Primary    Relevant Medications    metoprolol succinate XL (Toprol-XL) 24 hr tablet 12.5 mg    metoprolol succinate XL (Toprol-XL) 25 mg 24 hr tablet (Start on 3/3/2024)       Genitourinary and Reproductive    Hypokalemia    Relevant Medications    magnesium oxide (Mag-Ox) 400 mg (241.3 mg magnesium) tablet    spironolactone (Aldactone) 25 mg tablet (Start on 3/3/2024)    Other Relevant Orders    Referral to  Home Health    Basic metabolic panel    Magnesium    Hypomagnesemia    Relevant Medications    magnesium oxide (Mag-Ox) 400 mg (241.3 mg magnesium) tablet    Other Relevant Orders    Referral to Home Health    Basic metabolic panel    Magnesium     Other Visit Diagnoses       Other hypervolemia        PAF (paroxysmal atrial fibrillation) (CMS/HCC)        Relevant Medications    metoprolol succinate XL (Toprol-XL) 24 hr tablet 12.5 mg    metoprolol succinate XL (Toprol-XL) 25 mg 24 hr tablet (Start on 3/3/2024)    Other Relevant Orders    Transthoracic Echo (TTE) Complete (Completed)    Cardioversion External (Completed)    Acute diastolic congestive heart failure (CMS/HCC)        Relevant Medications    metoprolol succinate XL (Toprol-XL) 24 hr tablet 12.5 mg    metoprolol succinate XL (Toprol-XL) 25 mg 24 hr tablet (Start on 3/3/2024)    spironolactone (Aldactone) 25 mg tablet (Start on 3/3/2024)    torsemide (Demadex) 10 mg tablet (Start on 3/4/2024)             Disposition:  Home with [facility]    Issues Requiring Follow-Up:  Potassium and magnesium levels    Condition on Discharge:  Satisfactory    Discharge medications:     Medication List      START taking these medications     magnesium oxide 400 mg (241.3 mg magnesium) tablet; Commonly known as:   Mag-Ox; Take 1 tablet (400 mg) by mouth once daily.   metoprolol succinate XL 25 mg 24 hr tablet; Commonly known as:   Toprol-XL; Take 0.5 tablets (12.5 mg) by mouth once daily. Do not crush or   chew. Do not start before March 3, 2024.; Start taking on: March 3, 2024   spironolactone 25 mg tablet; Commonly known as: Aldactone; Take 0.5   tablets (12.5 mg) by mouth once daily. Do not start before March 3, 2024.;   Start taking on: March 3, 2024   torsemide 10 mg tablet; Commonly known as: Demadex; Take 1 tablet (10   mg) by mouth once a day on Monday, Wednesday, and Friday.; Start taking   on: March 4, 2024     CHANGE how you take these medications     apixaban 5 mg  tablet; Commonly known as: Eliquis; Take 1 tablet (5 mg)   by mouth 2 times a day.; What changed: Another medication with the same   name was removed. Continue taking this medication, and follow the   directions you see here.     CONTINUE taking these medications     acetaminophen 325 mg tablet; Commonly known as: Tylenol   amlodipine-olmesartan 10-40 mg tablet; Commonly known as: Celia; TAKE 1   TABLET BY MOUTH ONCE  DAILY   Beulah Low Dose Aspirin 81 mg EC tablet; Generic drug: aspirin   cloNIDine 0.3 mg/24 hr patch; Commonly known as: Catapres-TTS; PLACE 1   PATCH ON THE SKIN ONCE  WEEKLY   darifenacin 15 mg 24 hr tablet; Commonly known as: Enablex; TAKE 1   TABLET BY MOUTH ONCE  DAILY   docusate sodium 100 mg capsule; Commonly known as: Colace   estradiol 0.01 % (0.1 mg/gram) vaginal cream; Commonly known as: Estrace   latanoprost 0.005 % ophthalmic solution; Commonly known as: Xalatan   levothyroxine 50 mcg tablet; Commonly known as: Synthroid, Levoxyl; TAKE   1 TABLET BY MOUTH ONCE  DAILY   omega-3 acid ethyl esters 1 gram capsule; Commonly known as: Lovaza   Probiotic Pearls Acidophilus 1 billion cell capsule,delayed   release(DR/EC); Generic drug: L.acidophilus-Bifido.longum; Take 1 capsule   by mouth once daily.   Revlimid 5 mg capsule; Generic drug: lenalidomide   timolol 0.5 % ophthalmic solution; Commonly known as: Timoptic   traMADol 50 mg tablet; Commonly known as: Ultram; Take 1 tablet (50 mg)   by mouth every 8 hours if needed for moderate pain (4 - 6) for up to 20   doses.   trospium 20 mg tablet; Commonly known as: Sanctura   valACYclovir 500 mg tablet; Commonly known as: Valtrex   vitamin E 180 mg (400 unit) capsule     STOP taking these medications     benzonatate 100 mg capsule; Commonly known as: Tessalon   celecoxib 200 mg capsule; Commonly known as: CeleBREX   doxycycline 100 mg capsule; Commonly known as: Vibramycin   guaiFENesin 600 mg 12 hr tablet; Commonly known as: Mucinex   terconazole  "0.4 % vaginal cream; Commonly known as: Terazol 7                                                                       Discharge physical exam:  Vital signs:  Blood pressure 111/58, pulse 66, temperature 36.8 °C (98.2 °F), resp. rate 16, height 1.753 m (5' 9\"), weight 104 kg (230 lb 6.1 oz), SpO2 96 %.   At the time of discharge patient was alert, chest is clear, cardiac exam is a regular rhythm.  She still has 2+ edema bilateral lower extremities (primarily lymphedema, but some pitting also).  Please refer to progress note this date for full details.    Test Results Pending At Discharge:  None     Code Status:  Full Code     Outpatient Follow-Up:  Future Appointments   Date Time Provider Department Center   3/8/2024  2:00 PM Danny Hsu MD [id number] [place]   10/1/2024 11:15 AM [facility] [id number] ECHO/VASC 4 [id number] LAURA Schneider MD  03/02/24        *Some of this note was completed using Dragon voice recognition technology and may include unintended errors with respect to translation of words, typographical errors or grammar errors which may not have been identified prior to finalization of the chart note.  >31 minutes patient care/medication reconciliation/discharge coordination and discussion of discharge instructions & follow-up with the patient.      "

## 2024-03-02 NOTE — PROGRESS NOTES
[facility] Heart Progress Note           Rounding STANLEY/Cardiologist:  Herbert Montelongo DO, Dr. Lj Hood  Primary Cardiologist: Dr. Danny Hsu    Date:  3/2/2024  Patient:  Laura Woodward  YOB: 1935  MRN:  [id number]   Admit Date:  2/26/2024      SUBJECTIVE:    3/2/24  No new complaints.  Maintaining normal sinus rhythm.  Denies chest pain, dyspnea, palpitations, nausea, vomiting, dizziness.  Persistent chronic lower extremity edema.    3/1/24  Patient is awake and alert and oriented x 3.  She denies chest pain she said she is feeling much better her daughter is at the bedside spoke to him both at length and regarding the cardioversion today  Telemetry is A-fib in the 70s        2/29/2024:  Patient is sitting up in the chair.  States she is feeling better.  She denies chest pain or shortness of breath.  She is anxious about having a cardioversion and wants to talk more with her daughter.    2/28/24  Patient is awake and alert and oriented she sitting at the bedside commode she states that she feels a little bit better this morning  Denies chest pain does get some shortness of breath with exertion  EKG's A-fib no acute changes  Telemetry's A-fib 50s to 70s occasional PVC    2/27/24  Laura Woodward is a 88 y.o.  female patient who is being at the request of Dr. Schneider for inpatient consultation of CHF. She was admitted on 2/26/2024.  Previous [facility] and [facility] records have been reviewed in detail.    Patient with a history of DVT, hypertension, morbid obesity came into the emergency department complaining of cough and congestion and fatigue some palpitations been going on and off for the past 1 month.  Her daughter is at the bedside spoke to her at length she states that she had seen Dr. Hsu back in October she had a hernia when seen for preop clearance she had been doing very well.  She comes in found to be in new onset atrial fibrillation so they kindly asked cardiology to get  involved with her case.  She denies fever, chills, PND, orthopnea, claudications.  Positive for shortness of breath with exertion, peripheral edema, palpitations  EKG is A-fib no acute changes  Troponin 17     Cardiac  history  9/28/23  CONCLUSIONS:  1. Left ventricular systolic function is normal with a 60% estimated ejection fraction.  2. Spectral Doppler shows an impaired relaxation pattern of left ventricular diastolic filling.  3. There is moderate concentric left ventricular hypertrophy.  4. There is no evidence of mitral valve stenosis.  5. Trace mitral valve regurgitation.  6. Moderate tricuspid regurgitation.  7. Moderate aortic valve stenosis.  8. The aortic valve appears tricuspid with restriction.  9. There is moderate aortic valve cusp calcification.  10. Moderately elevated pulmonary artery pressure.     10/2/23  IMPRESSION:  Normal Lexiscan Myoview cardiac perfusion stress test.  No evidence of ischemia or myocardial infarction by perfusion imaging.  Normal left ventricular systolic function, ejection fraction 66%.  Noninvasive risk stratification: Low risk.  No previous available for comparison.      VITALS:     Vitals:    03/01/24 1530 03/01/24 1923 03/02/24 0007 03/02/24 0804   BP: 133/67 118/57 113/58 111/58   Pulse: 57  61 66   Resp:  18 16    Temp:  36.9 °C (98.4 °F) 36.6 °C (97.9 °F) 36.8 °C (98.2 °F)   TempSrc:       SpO2:  95% 93% 96%   Weight:       Height:           Intake/Output Summary (Last 24 hours) at 3/2/2024 1044  Last data filed at 3/2/2024 0553  Gross per 24 hour   Intake 116.42 ml   Output 1450 ml   Net -1333.58 ml       Wt Readings from Last 4 Encounters:   02/26/24 104 kg (230 lb 6.1 oz)   01/12/24 104 kg (230 lb)   10/19/23 104 kg (229 lb 4.5 oz)   08/30/23 104 kg (230 lb)       CURRENT HOSPITAL MEDICATIONS:   amlodipine-olmesartan, 1 tablet, oral, Daily  apixaban, 5 mg, oral, BID  aspirin, 81 mg, oral, Daily  cloNIDine, 1 patch, transdermal, Weekly  estradiol, 0.01 g, vaginal,  Nightly  icosapent ethyL, 2 g, oral, BID with meals  latanoprost, 1 drop, Both Eyes, Nightly  lenalidomide, 5 mg, oral, Daily  levothyroxine, 50 mcg, oral, Daily  magnesium oxide, 400 mg, oral, BID  metoprolol succinate XL, 12.5 mg, oral, Daily  potassium chloride CR, 20 mEq, oral, Daily  spironolactone, 12.5 mg, oral, Daily  timolol, 1 drop, ophthalmic (eye), q12h  valACYclovir, 250 mg, oral, Every Mon/Wed/Fri      oxygen, , Last Rate: 3 L/min (03/01/24 1314)  sodium chloride 0.9%, 10 mL/hr, Last Rate: 10 mL/hr (03/01/24 1324)      Current Outpatient Medications   Medication Instructions    acetaminophen (TYLENOL) 650 mg, oral, Every 6 hours PRN    amlodipine-olmesartan (Celia) 10-40 mg tablet 1 tablet, oral, Daily RT    apixaban (ELIQUIS) 10 mg, oral, 2 times daily    apixaban (ELIQUIS) 5 mg, oral, 2 times daily    aspirin (NELDA LOW DOSE ASPIRIN) 81 mg, oral, Daily    benzonatate (TESSALON) 100 mg, oral, 3 times daily PRN, Do not crush or chew.    celecoxib (CELEBREX) 400 mg, oral, Daily    cloNIDine (Catapres-TTS) 0.3 mg/24 hr patch PLACE 1 PATCH ON THE SKIN ONCE  WEEKLY    darifenacin (ENABLEX) 15 mg, oral, Daily    docusate sodium (COLACE) 100 mg, oral, 2 times daily    estradiol (ESTRACE) 0.01 g, vaginal, Nightly, 3 x a week    guaiFENesin (MUCINEX) 600 mg, oral, 2 times daily, Do not crush, chew, or split.    L.acidophilus-Bifido.longum (Probiotic Pearls Acidophilus) 1 billion cell capsule,delayed release(DR/EC) 1 capsule, oral, Daily    latanoprost (Xalatan) 0.005 % ophthalmic solution 1 drop, Both Eyes, Nightly    levothyroxine (SYNTHROID, LEVOXYL) 50 mcg, oral, Daily    omega-3 acid ethyl esters (LOVAZA) 1 g, oral, 2 times daily    Revlimid 5 mg, oral, Daily, As directed per oncology    terconazole (Terazol 7) 0.4 % vaginal cream 1 applicator, vaginal, Nightly, Every night for 7 days    timolol (Timoptic) 0.5 % ophthalmic solution 1 drop, ophthalmic (eye), Every 12 hours    traMADol (ULTRAM) 50 mg, oral,  Every 8 hours PRN    trospium (SANCTURA) 20 mg, oral, Every 12 hours    valACYclovir (VALTREX) 500 mg, oral, Daily, 1/2 tablet twice daily 3 times a week. M,W,F     vitamin E 400 Units, oral, Daily        PHYSICAL EXAMINATION:   GENERAL:  Well developed, well nourished, in no acute distress.  CHEST:  Symmetric and nontender.  NEURO/PSYCH:  Alert and oriented times three with approppriate behavior and responses.  NECK:  Supple, no JVD, no bruit.  LUNGS:  Clear to auscultation bilaterally, normal respiratory effort.  HEART:  s1,  s2 irregular       There are no rubs, clicks or heaves.  EXTREMITIES:  1  plus  pulses. 3+ plus edema      LAB DATA:     CBC:   Results from last 7 days   Lab Units 03/02/24 0419 03/01/24  0500 02/28/24  0542   WBC AUTO x10*3/uL 3.4* 4.0* 2.7*   RBC AUTO x10*6/uL 3.23* 3.55* 3.27*   HEMOGLOBIN g/dL 11.4* 12.3 11.3*   HEMATOCRIT % 35.3* 38.6 35.3*   MCV fL 109* 109* 108*   MCH pg 35.3* 34.6* 34.6*   MCHC g/dL 32.3 31.9* 32.0   RDW % 13.2 13.0 13.0   PLATELETS AUTO x10*3/uL 166 157 116*     CMP:    Results from last 7 days   Lab Units 03/02/24 0419 03/01/24  0500 02/29/24  0331 02/27/24  0752 02/26/24  1233   SODIUM mmol/L 142 144 144   < > 145   POTASSIUM mmol/L 3.7 3.6 4.2   < > 2.7*   CHLORIDE mmol/L 104 106 109*   < > 108*   CO2 mmol/L 33* 31 28   < > 26   BUN mg/dL 23 15 16   < > 15   CREATININE mg/dL 1.41* 1.09* 1.09*   < > 1.07*   GLUCOSE mg/dL 94 90 95   < > 94   PROTEIN TOTAL g/dL  --   --   --   --  5.2*   CALCIUM mg/dL 8.6 8.8 8.3*   < > 7.8*   BILIRUBIN TOTAL mg/dL  --   --   --   --  0.6   ALK PHOS U/L  --   --   --   --  81   AST U/L  --   --   --   --  16   ALT U/L  --   --   --   --  23    < > = values in this interval not displayed.     BMP:    Results from last 7 days   Lab Units 03/02/24  0419 03/01/24  0500 02/29/24  0331   SODIUM mmol/L 142 144 144   POTASSIUM mmol/L 3.7 3.6 4.2   CHLORIDE mmol/L 104 106 109*   CO2 mmol/L 33* 31 28   BUN mg/dL 23 15 16   CREATININE mg/dL  1.41* 1.09* 1.09*   CALCIUM mg/dL 8.6 8.8 8.3*   GLUCOSE mg/dL 94 90 95     Magnesium:  Results from last 7 days   Lab Units 03/02/24  0419 03/01/24  0500 02/29/24  0331   MAGNESIUM mg/dL 2.16 1.60 1.82     Troponin:    Results from last 7 days   Lab Units 02/26/24  1417 02/26/24  1233   TROPHS ng/L 17* 18*     BNP:   Results from last 7 days   Lab Units 02/26/24  1233   BNP pg/mL 401*       DIAGNOSTIC TESTING:     ECG 12 lead  Result Date: 2/27/2024    Atrial fibrillation with slow ventricular response Low voltage QRS ST & T wave abnormality, consider anterior ischemia Abnormal ECG When compared with ECG of 11-MAY-2022 17:08, Atrial fibrillation has replaced Sinus rhythm Inverted T waves have replaced nonspecific T wave abnormality in Inferior leads T wave inversion more evident in Anterior leads See ED provider note for full interpretation and clinical correlation Confirmed by Soraya Schneider ([id number]) on 2/27/2024 9:34:42 AM      RADIOLOGY:     Cardioversion External   Final Result      Transthoracic Echo (TTE) Complete   Final Result      CT angio chest for pulmonary embolism   Final Result   1. No pulmonary emboli.   2. Small to moderate right pleural effusion.   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     No evidence for DVT within the right lower extremity.     Bilateral calf veins not visualized due to edema.   Findings similar to prior ultrasound of 01/12/2024.   Findings discussed by telephone as a critical result with Dr. Konrad Lewis on 2/26/2024 at 1340 hours.   Signed by Anders Estrada MD      XR chest 1 view   Final Result   1. No acute pulmonary pathology.   2. Left port is unchanged.   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)    Essential hypertension    Hypothyroidism    Lymphedema of left lower extremity    Osteoporosis    Chronic venous stasis    Insomnia    Hemorrhoids    Obesity (BMI 30.0-34.9)    Depression, major, single episode, mild (CMS/HCC)    Periumbilical hernia    Stage 3a chronic kidney disease (CKD) (CMS/HCC)    Pre-op examination    Encounter for immunization    Ventral hernia, recurrent    Acute deep vein thrombosis (DVT) of femoral vein of left lower extremity (CMS/HCC)    Pain in right buttock    Hypokalemia    Hypomagnesemia    Generalized pain    On palliative antineoplastic chemotherapy    Anxiety and depression    Atrial fibrillation (CMS/HCC)    Atrial flutter, unspecified type (CMS/HCC)       ASSESSMENT:   New onset atrial fibrillation s/p CV 3/1/24  Acute on chronic diastolic heart failure NYHA class II  Pleural effusion  History of DVT on Eliquis  Morbid obesity  Hypokalemia  Hypomagnesia  Hypertension      PLAN:    Tele  monitoring  Check echo  Continue the oral anticoagulation Eliquis 5 mg p.o. twice daily  Potassium 40 mEq every 4 hours x 3 doses  Magnesium 4 g IVPBx 1 recheck level in a.m.  Lasix 20 mg IV push every 12  Daily weights  Strict intake and output  Lopressor 12.5 mg p.o. twice daily    Further recommendations per Dr Erwin Inman [facility] [place]    Of note, this documentation is completed using the Dragon Dictation system (voice recognition software). There may be spelling and/or grammatical errors that were not corrected prior to final submission.        2/28/2024:  She looks and feels fine.  She denies chest pain or shortness of breath.  Vital signs are stable.  Heart rates are in the 60s.  Remains in atrial fibrillation.  Approximate 1 L negative fluid balance yesterday.  Cardiac rhythm is irregularly irregular.  Lungs are clear.  Moderate bilateral  peripheral edema.  Hemoglobin is 11.3.  Sodium is 146 with potassium 2.9.  Normal BUN and creatinine.  Magnesium is 1.45.  Potassium and magnesium are being supplemented.  We discussed risks and benefits of elective cardioversion once electrolytes are adequately supplemented.  She is agreeable to proceed but currently prefers this to be scheduled for March 1, 2024.      2/29/2024:  Patient is sitting up in the chair.  States she is feeling better.  She denies chest pain or shortness of breath.  States her legs feel heavy.  She does not want to go home until her edema significantly improved.  She is anxious about having a cardioversion and wants to talk more with her daughter.  Vital signs are stable with exception of mildly elevated systolic blood pressure.  Heart rate 60s to 70s.  900 cc negative fluid balance yesterday.  Weight is stable.  Cardiac rhythm is irregularly irregular.  Lungs clear.  3+ bilateral peripheral edema as well as apparent lymphedema.  Basic metabolic profile is normal with exception of chloride 109 and creatinine 1.09.  Potassium is now 4.2.  Magnesium was 1.82.  She remains in atrial fibrillation. Continue same cardiac medications including furosemide 20 mg IV BID.  Patient states her preference to delay cardioversion until tomorrow am.     3/1/24  Telemetry monitoring  Spoke at length with family plan for cardioversion today well informed of risk versus benefits would like to proceed with procedure  Keep magnesium greater than 2.0  Keep potassium between 4.0 and 4.5    Further recommendations per Dr Hood    I have personally interviewed and examined the patient.   I have personally and independently reviewed labs and diagnostic testing.  I have personally verified the elements of the history and physical listed above and changes, if any, are noted.   I have personally reviewed the assessment and plan as documented by Jose Inman, JHONNY, CNP and concur.    She is resting comfortably.   States she feels well.  No chest pain or shortness of breath.  Vital signs are stable.  2.6 L negative fluid balance yesterday.  Cardiac rhythm is irregularly irregular.  Lungs are clear.  3+ bilateral peripheral edema/lymphedema.  Hemoglobin is 12.3.  Sodium 144 with potassium 3.6.  BUN 15 and creatinine 1.09.  She remains in atrial fibrillation with controlled ventricular response.  She continues to express concern about the right lower extremity edema.  She presented with acute diastolic congestive heart failure in the setting of underlying valvular heart disease.  Moderately severe aortic stenosis.  She is scheduled undergo elective cardioversion today.  Will change metoprolol to tartrate to Toprol-XL.  Agree with titrating Aldactone upward.  She unfortunately is highly prone to urinary tract infections per her daughter.  In light of this Jardiance may not be an ideal option but can be considered.  Further recommendations to follow.    3/2/24    Assessment:  Paroxysmal atrial fibrillation/cardioversion March 1, 2024-maintaining normal sinus rhythm  Chronic diastolic congestive heart failure  Aortic valve stenosis/aortic valve regurgitation  Hypertension  History of DVT  Morbid obesity  Hypokalemia  Hypomagnesemia    Recommendations:  Patient maintaining normal sinus rhythm.  Compensated from a heart failure standpoint.  Stop IV Lasix due to renal insufficiency.  Increase spironolactone 25 mg for hypokalemia at discharge from hospital.  Stop potassium chloride at discharge.    Okay to discharge on following cardiac medical therapy:  amlodipine-olmesartan, 1 tablet, oral, Daily  apixaban, 5 mg, oral, BID  aspirin, 81 mg, oral, Daily  cloNIDine, 1 patch, transdermal, Weekly  icosapent ethyL, 2 g, oral, BID with meals  magnesium oxide, 400 mg, oral, BID  metoprolol succinate XL, 12.5 mg, oral, Daily  spironolactone, 25 mg, oral, Daily    Follow-up with Dr. Hood in 1 week.  Check BMP in 1 week.  Message sent to  schedulers March 2    Sign off cardiology.  Please call with any change in cardiac status.    Sebastián

## 2024-03-02 NOTE — PROGRESS NOTES
03/02/24 1238   Discharge Planning   Home or Post Acute Services In home services   Type of Home Care Services Home OT;Home PT   Patient expects to be discharged to: Home with [facility]     Pt has written DC, and HCO in Epic. [facility]  notified that pt DC today.

## 2024-03-04 ENCOUNTER — PATIENT OUTREACH (OUTPATIENT)
Dept: PRIMARY CARE | Facility: CLINIC | Age: 89
End: 2024-03-04
Payer: MEDICARE

## 2024-03-04 DIAGNOSIS — I48.91 ATRIAL FIBRILLATION, UNSPECIFIED TYPE (MULTI): ICD-10-CM

## 2024-03-04 DIAGNOSIS — I82.412 ACUTE DEEP VEIN THROMBOSIS (DVT) OF FEMORAL VEIN OF LEFT LOWER EXTREMITY (MULTI): ICD-10-CM

## 2024-03-04 DIAGNOSIS — I10 ESSENTIAL HYPERTENSION: ICD-10-CM

## 2024-03-04 DIAGNOSIS — E83.42 HYPOMAGNESEMIA: ICD-10-CM

## 2024-03-04 DIAGNOSIS — E87.6 HYPOKALEMIA: ICD-10-CM

## 2024-03-04 DIAGNOSIS — I35.0 AORTIC VALVE STENOSIS, ETIOLOGY OF CARDIAC VALVE DISEASE UNSPECIFIED: ICD-10-CM

## 2024-03-04 LAB
ATRIAL RATE: 70 BPM
P AXIS: 78 DEGREES
P OFFSET: 160 MS
P ONSET: 115 MS
PR INTERVAL: 212 MS
Q ONSET: 221 MS
QRS COUNT: 11 BEATS
QRS DURATION: 82 MS
QT INTERVAL: 416 MS
QTC CALCULATION(BAZETT): 449 MS
QTC FREDERICIA: 438 MS
R AXIS: 43 DEGREES
T AXIS: 8 DEGREES
T OFFSET: 429 MS
VENTRICULAR RATE: 70 BPM

## 2024-03-04 NOTE — PROGRESS NOTES
Discharge Facility:  [facility]    Discharge Diagnosis:  Atrial fibrillation (CMS/HCC)   DC cardioversion on 3/1/2024   Aortic stenosis  Essential hypertension  Hypothyroidism  Obesity (BMI 30.0-34.9)  Acute deep vein thrombosis (DVT) of femoral vein of left lower extremity (CMS/HCC)  Hypokalemia  Hypomagnesemia    Admission Date:2/26/24  Discharge Date: 3/2/24    PCP Appointment Date: Daughter declined my offer to set up appt for her. She said she will call office herself to set up next week or following week. Reported it is to stressful on patient to leave the house for more then one appt a week. I let her know records will be in chart for Dr Estrada to see when she goes in for follow up.     Specialist Appointment Date:   follow-up with cardiology, Dr. Hsu on 8 March -daughter aware and will be transporting pt to appt   3/8/2024 2:00 PM   [facility]   CARDIOLOGY HOSP DISCHARGE   Authorization not required   [id number] ([place])   Danny Hsu MD      [facility]     Hospital Encounter and Summary: Linked   See discharge assessment below for further details  Engagement  Call Start Time: 1245 (Spoke with Daughter/Caregiver Iram) (3/4/2024 12:51 PM)    Medications  Medications reviewed with patient/caregiver?: Yes (3/4/2024 12:51 PM)  Is the patient having any side effects they believe may be caused by any medication additions or changes?: No (3/4/2024 12:51 PM)  Does the patient have all medications ordered at discharge?: Yes (Iram was able to pick medications up at Pharmacy) (3/4/2024 12:51 PM)  Care Management Interventions: Provided patient education (3/4/2024 12:51 PM)  Prescription Comments: START taking:  magnesium oxide (Mag-Ox)   metoprolol succinate XL (Toprol-XL)   Start taking on: March 3, 2024  spironolactone (Aldactone)   Start taking on: March 3, 2024  torsemide (Demadex)   Start taking on: March 4, 2024   CHANGE how you take:  apixaban  (Eliquis)    STOP taking:  benzonatate 100 mg capsule (Tessalon)   celecoxib 200 mg capsule (CeleBREX)   doxycycline 100 mg capsule (Vibramycin)   guaiFENesin 600 mg 12 hr tablet (Mucinex)   terconazole 0.4 % vaginal cream (Terazol 7) (3/4/2024 12:51 PM)  Is the patient taking all medications as directed (includes completed medication regime)?: Yes (3/4/2024 12:51 PM)  Care Management Interventions: Provided patient education (3/4/2024 12:51 PM)    Appointments  Does the patient have a primary care provider?: Yes (3/4/2024 12:51 PM)  Care Management Interventions: Educated patient on importance of making appointment (daughter wants to call herself to set up appt with PCP- daughter reported it is to much for patient to handle if she has more then one appt in a week so would like to call and schedule herself for a few weeks out.) (3/4/2024 12:51 PM)  Has the patient kept scheduled appointments due by today?: Yes (3/4/2024 12:51 PM)  Care Management Interventions: Educated on importance of keeping appointment (Reviewed time& Date of upcoming cardio appt) (3/4/2024 12:51 PM)    Self Management  What is the home health agency?:  [facility] (3/4/2024 12:51 PM)  Has home health visited the patient within 72 hours of discharge?: Call prior to 72 hours (called daughter twice to set up first visit. Daughter asked that she have a few days to get things in place, Homecare to start tuesday) (3/4/2024 12:51 PM)  What Durable Medical Equipment (DME) was ordered?: n/a (3/4/2024 12:51 PM)    Patient Teaching  Does the patient have access to their discharge instructions?: Yes (3/4/2024 12:51 PM)  Care Management Interventions: Reviewed instructions with patient (3/4/2024 12:51 PM)  What is the patient's perception of their health status since discharge?: Same (Tired from hospital stay but resting more comfortable now and catching up on sleep) (3/4/2024 12:51 PM)  Is the patient/caregiver able to  teach back the hierarchy of who to call/visit for symptoms/problems? PCP, Specialist, Home Health nurse, Urgent Care, ED, 911: Yes (3/4/2024 12:51 PM)  Patient/Caregiver Education Comments: diet Instructions: Low Sodium- Do not restart the keto diet. (3/4/2024 12:51 PM)    Wrap Up  Wrap Up Additional Comments: Laura was currently enrolled in TCM program when admitted. I reviewed recent hospital stay and answered any questions with daughter Iram. She has my contact info if she would have any questions or need assistance before next outreach. (3/4/2024 12:51 PM)  Call End Time: 1300 (3/4/2024 12:51 PM)

## 2024-03-05 ENCOUNTER — HOME CARE VISIT (OUTPATIENT)
Dept: HOME HEALTH SERVICES | Facility: HOME HEALTH | Age: 89
End: 2024-03-05
Payer: MEDICARE

## 2024-03-05 VITALS
DIASTOLIC BLOOD PRESSURE: 66 MMHG | BODY MASS INDEX: 30.35 KG/M2 | HEART RATE: 61 BPM | WEIGHT: 212 LBS | OXYGEN SATURATION: 97 % | TEMPERATURE: 97.5 F | HEIGHT: 70 IN | RESPIRATION RATE: 18 BRPM | SYSTOLIC BLOOD PRESSURE: 108 MMHG

## 2024-03-05 PROCEDURE — 1090000001 HH PPS REVENUE CREDIT

## 2024-03-05 PROCEDURE — 0023 HH SOC

## 2024-03-05 PROCEDURE — 1090000002 HH PPS REVENUE DEBIT

## 2024-03-05 PROCEDURE — G0299 HHS/HOSPICE OF RN EA 15 MIN: HCPCS | Mod: HHH

## 2024-03-05 PROCEDURE — 169592 NO-PAY CLAIM PROCEDURE

## 2024-03-05 ASSESSMENT — ENCOUNTER SYMPTOMS
STOOL FREQUENCY: DAILY
SUBJECTIVE PAIN PROGRESSION: UNCHANGED
MUSCLE WEAKNESS: 1
DEPRESSION: 0
LOWEST PAIN SEVERITY IN PAST 24 HOURS: 0/10
FATIGUES EASILY: 1
OCCASIONAL FEELINGS OF UNSTEADINESS: 0
PAIN: 1
APPETITE LEVEL: GOOD
CHANGE IN APPETITE: UNCHANGED
PAIN LOCATION: LEFT LEG
LAST BOWEL MOVEMENT: 66904
BOWEL PATTERN NORMAL: 1
PAIN SEVERITY GOAL: 0/10
LOWER EXTREMITY EDEMA: 1
PAIN LOCATION - PAIN SEVERITY: 3/10
LOSS OF SENSATION IN FEET: 0
HIGHEST PAIN SEVERITY IN PAST 24 HOURS: 3/10
HYPERTENSION: 1

## 2024-03-05 ASSESSMENT — ACTIVITIES OF DAILY LIVING (ADL)
CURRENT_FUNCTION: STAND BY ASSIST
OASIS_M1830: 03
PHYSICAL TRANSFERS ASSESSED: 1
AMBULATION ASSISTANCE: STAND BY ASSIST
ENTERING_EXITING_HOME: NEEDS ASSISTANCE
AMBULATION ASSISTANCE: 1

## 2024-03-05 ASSESSMENT — PAIN SCALES - PAIN ASSESSMENT IN ADVANCED DEMENTIA (PAINAD)
CONSOLABILITY: 0 - NO NEED TO CONSOLE.
CONSOLABILITY: 0
BREATHING: 0
BODYLANGUAGE: 0
TOTALSCORE: 0
NEGVOCALIZATION: 0 - NONE.
BODYLANGUAGE: 0 - RELAXED.
NEGVOCALIZATION: 0
FACIALEXPRESSION: 0 - SMILING OR INEXPRESSIVE.
FACIALEXPRESSION: 0

## 2024-03-05 NOTE — HOME HEALTH
Pt arrives to ED with complaints of lower abd pain, that started around 1800 today.    SOC COMPLETED TODAY AFTER INPATIENT STAY FOR THE FOLLOWIN y.o. 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  She presented to the ER with increasing swelling of her lower legs after being treated for an upper respiratory infection.  Her left leg had recently been diagnosed with a blood clot for which she was on Eliquis/apixaban, and now her right leg was swelling and uncomfortable.  She denied palpitations or chest pains.  She was on a keto diet by her daughter.  In the ER her chemistry panel showed hypokalemia with a potassium of 2.7, hypomagnesemia with a magnesium of 1.29 (was normal on 2024), electrolytes were otherwise normal.  RFT's and LFTs unremarkable.  BNP was elevated at 401, troponins were mildly elevated but in a level pattern.  TSH was 1.26.  CBC unremarkable other than a platelet count of 105K.  Influenza A, RSV, and COVID-19 were negative.  CXR with no significant disease, CTA showed no pulmonary embolus, she had a small to moderate right pleural effusion.  EKGs showed atrial fibrillation with a controlled rate (new from last EKG in 2022).  Patient received potassium and magnesium replacement and was admitted for further workup and treatmenT.    Potassium and magnesium were replaced, requiring large amounts and eventual addition of spironolactone.  Most likely the cause of her low potassium and magnesium were due to the keto diet as she was not on diuretics PTA.  Her CHF was treated with IV furosemide.  Echocardiogram on  with a normal LVEF, mild to moderately dilated LA, mild TR, moderate AR.  She was seen in consultation by cardiology, and underwent a DC cardioversion on 3/1/2024.  Patient did diurese 5.5 L during admission, she still had lower extremity edema bilaterally L >R, much of which is due to her chronic  lymphedema, the right lower extremity edema was improving some.    She is discharged in improved condition with [facility] to help monitor her heart rate & CHF.  Repeat BMP and magnesium levels ordered to be drawn by [facility] in 1 week.  She was advised to avoid a keto diet to prevent further hypokalemia and hypomagnesemia.  She will continue to use her compression hose both lower extremities.  She should follow-up with her PCP, Dr. Ned Estrada after discharge.  She will follow-up with cardiology, Dr. Hsu on 8 March.    ASSESSMENT AS DOCUMENTED. VITAL SIGNS STABLE. PATIENT ALERT AND ORIENTED. LIVES WITH DAUGHTER NEEDS MET ON MAIN LEVEL OF HOME. DAUGHTER WORKS IN AFTERNOON SHIFT. USES WALKER FOR AMBULATION. REVIEWED MEDICATIONS USES AND SIDE EFFECTS. DTR IS MANAGING MEDICATIONS FOR PATIENT NO PROBLEMS FOUND. INSTRUCTED ON DAILY WEIGHT MONITORING, ELEVATING LEGS FOR EDEMA CONTROL AS WELL AS NO ADDED SALT DIET. PATIENT/CG STATES UNDERSTANDING. WILL PLAN ON SEEING PATIENT 1X WEEKLY FOR 4 WEEKS.    PLAN FOR NEXT VISIT: CP ASSESS/DISEASE PROCESS AND MEDICATION EDUCATION. OBTAIN LABS FOR BMP/MG LEVEL WITH RESULTS TO PCP DR NORM ESTRADA         Procedures:  DC cardioversion to NSR on 3/1/2024

## 2024-03-06 ENCOUNTER — HOME CARE VISIT (OUTPATIENT)
Dept: HOME HEALTH SERVICES | Facility: HOME HEALTH | Age: 89
End: 2024-03-06
Payer: MEDICARE

## 2024-03-06 ENCOUNTER — TELEPHONE (OUTPATIENT)
Dept: PRIMARY CARE | Facility: CLINIC | Age: 89
End: 2024-03-06
Payer: MEDICARE

## 2024-03-06 DIAGNOSIS — I82.412 ACUTE DEEP VEIN THROMBOSIS (DVT) OF FEMORAL VEIN OF LEFT LOWER EXTREMITY (MULTI): ICD-10-CM

## 2024-03-06 PROCEDURE — 1090000001 HH PPS REVENUE CREDIT

## 2024-03-06 PROCEDURE — 1090000002 HH PPS REVENUE DEBIT

## 2024-03-06 NOTE — TELEPHONE ENCOUNTER
Call placed to patient's daughter and advised of message, verbalized understanding and will call back if diarrhea does not resolve.

## 2024-03-06 NOTE — TELEPHONE ENCOUNTER
Patient's daughter Soraya called with concerns with diarrhea, stated that patient was discharged on Saturday by Dr. Schneider and was discharged with some new medications, c/o patient is having diarrhea 4-5 times daily and thinks it may be due to one of her medications, she stated that she looked up medications on Web [employer] and diarrhea can be a side effect with Magnesium oxide, Metoprolol, and Spironolactone. Daughter wanted to see what Dr. Estrada thinks, if she should discontinue any of these medications?   Stated that patient has an upcoming appointment with Cardiology on Friday.   I advised daughter that patient should also schedule a hospital follow-up to review medications and hospital stay, transferred to front to schedule.  Please advise of medications.

## 2024-03-06 NOTE — NURSING NOTE
Attempted to reach patient again for follow up. No answer. Message left with call back information.  Call made by Congestive Heart Failure Clinical Nurse Navigator.

## 2024-03-07 ENCOUNTER — TELEPHONE (OUTPATIENT)
Dept: CARDIOLOGY | Facility: CLINIC | Age: 89
End: 2024-03-07
Payer: MEDICARE

## 2024-03-07 PROCEDURE — 1090000002 HH PPS REVENUE DEBIT

## 2024-03-07 PROCEDURE — 1090000001 HH PPS REVENUE CREDIT

## 2024-03-07 NOTE — TELEPHONE ENCOUNTER
Patient's daughter called the office and advised that the patient is still having diarrhea. Daughter is asking if she can give her mom some imodium?      Daughter also asked for another 30 day supply of Eliquis to be sent to the pharmacy until her appointment with Dr. King at the end of the month.

## 2024-03-07 NOTE — TELEPHONE ENCOUNTER
Pt's daughter left message asking for return call as pt is scheduled for apt tomorrow at 2pm with Dr. Hsu.  Per Soraya pt has diarrhea and she feels that it is from her heart medication.    Return call placed to Soraya at [phone].  Soraya states that Dr. Estrada told her not to take the magnesium but pt still has diarrhea.  Per Soraya she believes it is from one of the 4 new medications that were started.  Soraya is asking if she should stop the pt's heart medications.  Advised that we can not advise this.  Advised that we can forward message to Dr. Hsu.  Advised that she needs to let the PCP know that pt is still having diarrhea and see what they recommend to do from a general stand point.  Advised that there can be multiple causes for diarrhea.  Advised that they may need to have a stool sample.  Per Soraya she will have pt her for her apt one way or another.  Advised if pt is having diarrhea we can rescheduled her apt.  Soraya states no she has too see him.  Pt's daughter was frustrated and said thanks for NOTHING.      Routed to Yonis YO RN

## 2024-03-08 ENCOUNTER — OFFICE VISIT (OUTPATIENT)
Dept: CARDIOLOGY | Facility: CLINIC | Age: 89
End: 2024-03-08
Payer: MEDICARE

## 2024-03-08 VITALS — DIASTOLIC BLOOD PRESSURE: 64 MMHG | HEART RATE: 64 BPM | SYSTOLIC BLOOD PRESSURE: 124 MMHG

## 2024-03-08 DIAGNOSIS — N18.31 STAGE 3A CHRONIC KIDNEY DISEASE (CKD) (MULTI): ICD-10-CM

## 2024-03-08 DIAGNOSIS — E03.9 HYPOTHYROIDISM, UNSPECIFIED TYPE: ICD-10-CM

## 2024-03-08 DIAGNOSIS — I35.0 NONRHEUMATIC AORTIC VALVE STENOSIS: ICD-10-CM

## 2024-03-08 DIAGNOSIS — Z78.9 NEVER SMOKED TOBACCO: ICD-10-CM

## 2024-03-08 DIAGNOSIS — I10 ESSENTIAL HYPERTENSION: ICD-10-CM

## 2024-03-08 DIAGNOSIS — I89.0 LYMPHEDEMA OF LEFT LOWER EXTREMITY: ICD-10-CM

## 2024-03-08 DIAGNOSIS — I50.31 ACUTE DIASTOLIC CONGESTIVE HEART FAILURE (MULTI): ICD-10-CM

## 2024-03-08 DIAGNOSIS — E87.6 HYPOKALEMIA: ICD-10-CM

## 2024-03-08 DIAGNOSIS — I48.91 ATRIAL FIBRILLATION, UNSPECIFIED TYPE (MULTI): ICD-10-CM

## 2024-03-08 DIAGNOSIS — I82.412 ACUTE DEEP VEIN THROMBOSIS (DVT) OF FEMORAL VEIN OF LEFT LOWER EXTREMITY (MULTI): ICD-10-CM

## 2024-03-08 DIAGNOSIS — I48.0 PAROXYSMAL ATRIAL FIBRILLATION (MULTI): ICD-10-CM

## 2024-03-08 DIAGNOSIS — I87.8 CHRONIC VENOUS STASIS: ICD-10-CM

## 2024-03-08 PROCEDURE — 1123F ACP DISCUSS/DSCN MKR DOCD: CPT | Performed by: INTERNAL MEDICINE

## 2024-03-08 PROCEDURE — 3078F DIAST BP <80 MM HG: CPT | Performed by: INTERNAL MEDICINE

## 2024-03-08 PROCEDURE — 1090000002 HH PPS REVENUE DEBIT

## 2024-03-08 PROCEDURE — 99215 OFFICE O/P EST HI 40 MIN: CPT | Performed by: INTERNAL MEDICINE

## 2024-03-08 PROCEDURE — 1126F AMNT PAIN NOTED NONE PRSNT: CPT | Performed by: INTERNAL MEDICINE

## 2024-03-08 PROCEDURE — 1036F TOBACCO NON-USER: CPT | Performed by: INTERNAL MEDICINE

## 2024-03-08 PROCEDURE — 93000 ELECTROCARDIOGRAM COMPLETE: CPT | Performed by: INTERNAL MEDICINE

## 2024-03-08 PROCEDURE — 1159F MED LIST DOCD IN RCRD: CPT | Performed by: INTERNAL MEDICINE

## 2024-03-08 PROCEDURE — 1090000001 HH PPS REVENUE CREDIT

## 2024-03-08 PROCEDURE — 3074F SYST BP LT 130 MM HG: CPT | Performed by: INTERNAL MEDICINE

## 2024-03-08 PROCEDURE — 1111F DSCHRG MED/CURRENT MED MERGE: CPT | Performed by: INTERNAL MEDICINE

## 2024-03-08 RX ORDER — METOPROLOL SUCCINATE 25 MG/1
12.5 TABLET, EXTENDED RELEASE ORAL DAILY
Qty: 45 TABLET | Refills: 3 | Status: SHIPPED | OUTPATIENT
Start: 2024-03-08 | End: 2024-03-21 | Stop reason: SDUPTHER

## 2024-03-08 RX ORDER — NITROGLYCERIN 0.4 MG/1
0.4 TABLET SUBLINGUAL EVERY 5 MIN PRN
Qty: 25 TABLET | Refills: 5 | Status: SHIPPED | OUTPATIENT
Start: 2024-03-08 | End: 2025-03-08

## 2024-03-08 RX ORDER — TORSEMIDE 10 MG/1
10 TABLET ORAL
Qty: 36 TABLET | Refills: 3 | Status: SHIPPED | OUTPATIENT
Start: 2024-03-08 | End: 2024-03-21 | Stop reason: SDUPTHER

## 2024-03-08 RX ORDER — SPIRONOLACTONE 25 MG/1
TABLET ORAL
Qty: 15 TABLET | Refills: 0 | OUTPATIENT
Start: 2024-03-08 | End: 2024-03-21 | Stop reason: SDUPTHER

## 2024-03-08 NOTE — PATIENT INSTRUCTIONS
Patient to follow up in 6 months with Dr. Danny Hsu MD [facility]     Please do NOT hold Eliquis- this is important. This is because of the Atrial Fibrillation, to prevent stroke.     Please trial HOLDING Spironolactone for now.   Give at least 3-4 days after to determine if diarrhea is better, if not- this needs to be reviewed with Dr. Estrada.   Monitor blood pressure as well.     No other changes today.   Continue same medications and treatments.   Patient educated on proper medication use.   Patient educated on risk factor modification.   Please bring any lab results from other providers / physicians to your next appointment.     Please bring all medicines, vitamins, and herbal supplements with you when you come to the office.     Prescriptions will not be filled unless you are compliant with your follow up appointments or have a follow up appointment scheduled as per instruction of your physician. Refills should be requested at the time of your visit.    Yonis CORONADO RN am scribing for and in the presence of Dr. Danny Hsu MD [facility]

## 2024-03-08 NOTE — PROGRESS NOTES
Referred by Dr. Mckenna ref. provider found provider found for   Chief Complaint   Patient presents with    Hospital Follow-up     TCM from [facility] for a-fib s/p cardioversion.  At home her weight was 210#        History of Present Illness  Laura Woodward is a 88 y.o. year old female patient was discharged in the hospital after admission for what appears to be leg swelling.  Tests were done echo showed moderate aortic and mild regurgitation.  She does have some pleural effusion and she is currently on diuretics.  She stated that she has been having diarrhea although she took antibiotics for 14 days prior to admission for respiratory infection.  I had a very lengthy discussion with the patient today and her daughter.  Her daughter thinks that medication are causing diarrhea.  I told her we need to rule out C. difficile since she took longer duration of antibiotics.  I asked her to follow-up with primary care regarding possible stool culture.  She insist that she wants to hold one of the cardiac medication therefore I told her to hold the spironolactone for now and reassess his symptoms.  The patient understood.  Will call if any problem and follow-up as scheduled the patient had atrial fibrillation cardioverted to sinus rhythm currently today's EKG shows normal sinus rhythm and she is on Eliquis    Past Medical History  Past Medical History:   Diagnosis Date    Encounter for preprocedural cardiovascular examination 01/31/2017    Pre-operative cardiovascular examination    HL (hearing loss)     Patient states hard of hearing    Lymphedema, not elsewhere classified 12/16/2022    Lymphedema    Obesity, unspecified 11/10/2022    Class 1 obesity with body mass index (BMI) of 33.0 to 33.9 in adult    Obesity, unspecified 10/27/2022    Class 1 obesity with body mass index (BMI) of 34.0 to 34.9 in adult    Other bursitis of hip, left hip 10/12/2021    Hip bursitis, left    Other conditions influencing health status 01/31/2017     Cardiovascular Stress Test    Other seasonal allergic rhinitis 11/10/2022    Seasonal allergies    Other specified noninflammatory disorders of vagina 06/23/2022    Vaginal lesion    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    History of malignant lymphoma    Personal history of non-Hodgkin lymphomas     History of lymphoma    Personal history of other diseases of the circulatory system 01/25/2017    History of abnormal electrocardiography    Personal history of other diseases of the circulatory system     History of coronary artery disease    Personal history of other diseases of the digestive system 04/20/2022    History of rectal bleeding    Personal history of other diseases of the digestive system 06/06/2022    History of rectal bleeding    Personal history of other diseases of the digestive system 06/06/2022    History of constipation    Personal history of other diseases of the digestive system 04/19/2022    History of anal fissures    Personal history of other diseases of the respiratory system 10/27/2022    History of upper respiratory infection    Personal history of other diseases of urinary system 12/16/2022    History of renal insufficiency syndrome    Personal history of other endocrine, nutritional and metabolic disease 10/12/2021    History of obesity    Personal history of other infectious and parasitic diseases 06/23/2022    History of herpes simplex infection    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    History of other malignant neoplasm of large intestine    Personal history of other specified conditions 06/23/2022    History of urinary frequency    Personal history of other specified conditions 06/23/2022    History of gross hematuria    Personal history of other specified conditions 01/13/2022    History of dysuria    Personal history of other specified conditions 01/04/2022    History of dysuria    Persons encountering health services in  other specified circumstances 10/12/2021    Encounter to establish care with new doctor    Subacute and chronic vaginitis 01/13/2022    Chronic vaginitis    Subacute and chronic vaginitis 01/04/2022    Subacute and chronic vaginitis    Trochanteric bursitis, left hip 06/06/2022    Greater trochanteric bursitis of left hip    Unspecified disorder of synovium and tendon, left thigh 06/06/2022    Tendinopathy of left gluteus medius    Unspecified disorder of synovium and tendon, left thigh 10/12/2021    Tendinopathy of left gluteus medius    Unspecified symptoms and signs involving the genitourinary system 01/04/2022    UTI symptoms    Unspecified symptoms and signs involving the genitourinary system     UTI symptoms    Urge incontinence 06/23/2022    Urge incontinence       Social History  Social History     Tobacco Use    Smoking status: Former     Types: Cigarettes    Smokeless tobacco: Never   Vaping Use    Vaping Use: Never used   Substance Use Topics    Alcohol use: Not Currently    Drug use: Never       Family History     Family History   Problem Relation Name Age of Onset    Hypertension Mother      Glaucoma Mother         Review of Systems  As per HPI, all other systems reviewed and negative.    Allergies:  Allergies   Allergen Reactions    Augmentin [Amoxicillin-Pot Clavulanate] Nausea/vomiting        Outpatient Medications:  Current Outpatient Medications   Medication Instructions    acetaminophen (TYLENOL) 650 mg, oral, Every 6 hours PRN    amlodipine-olmesartan (Celia) 10-40 mg tablet 1 tablet, oral, Daily RT    apixaban (ELIQUIS) 5 mg, oral, 2 times daily    aspirin (NELDA LOW DOSE ASPIRIN) 81 mg, oral, Daily    cloNIDine (Catapres-TTS) 0.3 mg/24 hr patch PLACE 1 PATCH ON THE SKIN ONCE  WEEKLY    darifenacin (ENABLEX) 15 mg, oral, Daily    docusate sodium (COLACE) 100 mg, oral, 2 times daily    estradiol (ESTRACE) 0.01 g, vaginal, Nightly, 3 x a week    L.acidophilus-Bifido.longum (Probiotic Pearls  Acidophilus) 1 billion cell capsule,delayed release(DR/EC) 1 capsule, oral, Daily    latanoprost (Xalatan) 0.005 % ophthalmic solution 1 drop, Both Eyes, Nightly    levothyroxine (SYNTHROID, LEVOXYL) 50 mcg, oral, Daily    magnesium oxide (MAG-OX) 400 mg, oral, Daily    metoprolol succinate XL (TOPROL-XL) 12.5 mg, oral, Daily, Do not crush or chew.    omega-3 acid ethyl esters (LOVAZA) 1 g, oral, 2 times daily    Revlimid 5 mg, oral, Daily, As directed per oncology    spironolactone (ALDACTONE) 12.5 mg, oral, Daily    timolol (Timoptic) 0.5 % ophthalmic solution 1 drop, ophthalmic (eye), Every 12 hours    torsemide (DEMADEX) 10 mg, oral, Every Mon/Wed/Fri    traMADol (ULTRAM) 50 mg, oral, Every 8 hours PRN    trospium (SANCTURA) 20 mg, oral, Every 12 hours    valACYclovir (VALTREX) 500 mg, oral, Daily, 1/2 tablet twice daily 3 times a week. M,W,F     vitamin E 400 Units, oral, Daily         Vitals:  Vitals:    03/08/24 1412   BP: 124/64   Pulse: 64       Physical Exam:  Physical Exam  Vitals and nursing note reviewed.   Constitutional:       Appearance: Normal appearance. She is normal weight.      Comments: Wheelchair for assistance   HENT:      Head: Normocephalic and atraumatic.   Eyes:      Extraocular Movements: Extraocular movements intact.      Pupils: Pupils are equal, round, and reactive to light.   Cardiovascular:      Rate and Rhythm: Normal rate and regular rhythm.      Pulses: Normal pulses.   Pulmonary:      Effort: Pulmonary effort is normal.      Breath sounds: Normal breath sounds.   Musculoskeletal:      Cervical back: Normal range of motion.      Right lower leg: No edema.      Left lower leg: No edema.   Skin:     General: Skin is warm and dry.   Neurological:      General: No focal deficit present.      Mental Status: She is alert and oriented to person, place, and time.             Assessment/Plan   Diagnoses and all orders for this visit:  Paroxysmal atrial fibrillation (CMS/HCC)  Nonrheumatic  aortic valve stenosis  Chronic venous stasis  Essential hypertension  Acute deep vein thrombosis (DVT) of femoral vein of left lower extremity (CMS/HCC)  Hypothyroidism, unspecified type  Stage 3a chronic kidney disease (CKD) (CMS/[facility])  Lymphedema of left lower extremity  Never smoked tobacco  Hypokalemia  Acute diastolic congestive heart failure (CMS/[facility])          Danny Hsu MD [facility]  Interventional Cardiology   of [facility]     Thank you for allowing me to participate in the care of this patient. Please do not hesitate to contact me with any further questions or concerns.

## 2024-03-09 PROCEDURE — 1090000002 HH PPS REVENUE DEBIT

## 2024-03-09 PROCEDURE — 1090000001 HH PPS REVENUE CREDIT

## 2024-03-10 PROCEDURE — 1090000002 HH PPS REVENUE DEBIT

## 2024-03-10 PROCEDURE — 1090000001 HH PPS REVENUE CREDIT

## 2024-03-11 ENCOUNTER — LAB (OUTPATIENT)
Dept: LAB | Facility: LAB | Age: 89
End: 2024-03-11
Payer: MEDICARE

## 2024-03-11 DIAGNOSIS — R19.7 DIARRHEA, UNSPECIFIED TYPE: ICD-10-CM

## 2024-03-11 DIAGNOSIS — R19.7 DIARRHEA, UNSPECIFIED TYPE: Primary | ICD-10-CM

## 2024-03-11 PROCEDURE — 87506 IADNA-DNA/RNA PROBE TQ 6-11: CPT

## 2024-03-11 PROCEDURE — G0180 MD CERTIFICATION HHA PATIENT: HCPCS | Performed by: STUDENT IN AN ORGANIZED HEALTH CARE EDUCATION/TRAINING PROGRAM

## 2024-03-11 PROCEDURE — 1090000002 HH PPS REVENUE DEBIT

## 2024-03-11 PROCEDURE — 1090000001 HH PPS REVENUE CREDIT

## 2024-03-11 RX ORDER — LOPERAMIDE HCL 2 MG
2 TABLET ORAL 3 TIMES DAILY PRN
Qty: 30 TABLET | Refills: 0 | Status: SHIPPED | OUTPATIENT
Start: 2024-03-11 | End: 2024-03-21

## 2024-03-12 ENCOUNTER — TELEPHONE (OUTPATIENT)
Dept: PRIMARY CARE | Facility: CLINIC | Age: 89
End: 2024-03-12
Payer: MEDICARE

## 2024-03-12 ENCOUNTER — HOME CARE VISIT (OUTPATIENT)
Dept: HOME HEALTH SERVICES | Facility: HOME HEALTH | Age: 89
End: 2024-03-12
Payer: MEDICARE

## 2024-03-12 ENCOUNTER — LAB REQUISITION (OUTPATIENT)
Dept: LAB | Facility: LAB | Age: 89
End: 2024-03-12
Payer: MEDICARE

## 2024-03-12 VITALS — RESPIRATION RATE: 18 BRPM | TEMPERATURE: 97.3 F | HEART RATE: 83 BPM | OXYGEN SATURATION: 96 %

## 2024-03-12 DIAGNOSIS — I48.0 PAROXYSMAL ATRIAL FIBRILLATION (MULTI): ICD-10-CM

## 2024-03-12 DIAGNOSIS — R19.7 DIARRHEA, UNSPECIFIED TYPE: Primary | ICD-10-CM

## 2024-03-12 LAB
ANION GAP SERPL CALC-SCNC: 14 MMOL/L (ref 10–20)
BUN SERPL-MCNC: 12 MG/DL (ref 6–23)
C COLI+JEJ+UPSA DNA STL QL NAA+PROBE: NOT DETECTED
CALCIUM SERPL-MCNC: 9.2 MG/DL (ref 8.6–10.6)
CHLORIDE SERPL-SCNC: 109 MMOL/L (ref 98–107)
CO2 SERPL-SCNC: 27 MMOL/L (ref 21–32)
CREAT SERPL-MCNC: 0.99 MG/DL (ref 0.5–1.05)
EC STX1 GENE STL QL NAA+PROBE: NOT DETECTED
EC STX2 GENE STL QL NAA+PROBE: NOT DETECTED
EGFRCR SERPLBLD CKD-EPI 2021: 55 ML/MIN/1.73M*2
GLUCOSE SERPL-MCNC: 123 MG/DL (ref 74–99)
MAGNESIUM SERPL-MCNC: 1.7 MG/DL (ref 1.6–2.4)
NOROVIRUS GI + GII RNA STL NAA+PROBE: NOT DETECTED
POTASSIUM SERPL-SCNC: 3.9 MMOL/L (ref 3.5–5.3)
RV RNA STL NAA+PROBE: NOT DETECTED
SALMONELLA DNA STL QL NAA+PROBE: NOT DETECTED
SHIGELLA DNA SPEC QL NAA+PROBE: NOT DETECTED
SODIUM SERPL-SCNC: 146 MMOL/L (ref 136–145)
V CHOLERAE DNA STL QL NAA+PROBE: NOT DETECTED
Y ENTEROCOL DNA STL QL NAA+PROBE: NOT DETECTED

## 2024-03-12 PROCEDURE — 1090000001 HH PPS REVENUE CREDIT

## 2024-03-12 PROCEDURE — 1090000002 HH PPS REVENUE DEBIT

## 2024-03-12 PROCEDURE — 80048 BASIC METABOLIC PNL TOTAL CA: CPT

## 2024-03-12 PROCEDURE — G0299 HHS/HOSPICE OF RN EA 15 MIN: HCPCS | Mod: HHH

## 2024-03-12 PROCEDURE — 83735 ASSAY OF MAGNESIUM: CPT

## 2024-03-12 PROCEDURE — 1090000003 HH PPS REVENUE ADJ

## 2024-03-12 SDOH — ECONOMIC STABILITY: GENERAL

## 2024-03-12 ASSESSMENT — PAIN SCALES - PAIN ASSESSMENT IN ADVANCED DEMENTIA (PAINAD)
NEGVOCALIZATION: 0 - NONE.
CONSOLABILITY: 0 - NO NEED TO CONSOLE.
FACIALEXPRESSION: 0
BREATHING: 0
FACIALEXPRESSION: 0 - SMILING OR INEXPRESSIVE.
TOTALSCORE: 0
BODYLANGUAGE: 0 - RELAXED.
CONSOLABILITY: 0
NEGVOCALIZATION: 0
BODYLANGUAGE: 0

## 2024-03-12 ASSESSMENT — ACTIVITIES OF DAILY LIVING (ADL)
AMBULATION ASSISTANCE: 1
MONEY MANAGEMENT (EXPENSES/BILLS): NEEDS ASSISTANCE
AMBULATION ASSISTANCE: INDEPENDENT

## 2024-03-12 ASSESSMENT — ENCOUNTER SYMPTOMS
CHANGE IN APPETITE: UNCHANGED
DEPRESSION: 0
SUBJECTIVE PAIN PROGRESSION: UNCHANGED
DIARRHEA: 1
HIGHEST PAIN SEVERITY IN PAST 24 HOURS: 0/10
PAIN SEVERITY GOAL: 0/10
LAST BOWEL MOVEMENT: 66911
DENIES PAIN: 1
STOOL FREQUENCY: TWICE DAILY
LOWEST PAIN SEVERITY IN PAST 24 HOURS: 0/10
LOSS OF SENSATION IN FEET: 0
PERSON REPORTING PAIN: PATIENT
OCCASIONAL FEELINGS OF UNSTEADINESS: 1
APPETITE LEVEL: GOOD

## 2024-03-12 NOTE — HOME HEALTH
FOLLOW UP RN VISIT. VITAL SIGNS STABLE. DENIES ANY PAIN. REPORTS ONGOING DIARRHEA. DAUGHTER TOOK STOOL SPECIMEN TO LAB YESTERDAY AS ORDERED BY DR DURAN. PENDING RESULTS.   SHE DID STOP MAGNESIUM AS POSSIBLE CAUSE OF NEW ONSET DIARRHEA AS INSTRUCTED BY DOCTOR.    PLAN FOR NEXT VISIT: CP ASSESS/DISEASE PROCESS AND MEDICATION

## 2024-03-13 PROCEDURE — 1090000001 HH PPS REVENUE CREDIT

## 2024-03-13 PROCEDURE — 1090000002 HH PPS REVENUE DEBIT

## 2024-03-14 LAB — O+P STL MICRO: NEGATIVE

## 2024-03-14 PROCEDURE — 1090000001 HH PPS REVENUE CREDIT

## 2024-03-14 PROCEDURE — 1090000002 HH PPS REVENUE DEBIT

## 2024-03-15 PROCEDURE — 1090000001 HH PPS REVENUE CREDIT

## 2024-03-15 PROCEDURE — 1090000002 HH PPS REVENUE DEBIT

## 2024-03-16 PROCEDURE — 1090000002 HH PPS REVENUE DEBIT

## 2024-03-16 PROCEDURE — 1090000001 HH PPS REVENUE CREDIT

## 2024-03-17 PROCEDURE — 1090000002 HH PPS REVENUE DEBIT

## 2024-03-17 PROCEDURE — 1090000001 HH PPS REVENUE CREDIT

## 2024-03-18 PROCEDURE — 1090000002 HH PPS REVENUE DEBIT

## 2024-03-18 PROCEDURE — 1090000001 HH PPS REVENUE CREDIT

## 2024-03-19 ENCOUNTER — APPOINTMENT (OUTPATIENT)
Dept: HOME HEALTH SERVICES | Facility: HOME HEALTH | Age: 89
End: 2024-03-19
Payer: MEDICARE

## 2024-03-19 PROCEDURE — 1090000002 HH PPS REVENUE DEBIT

## 2024-03-19 PROCEDURE — 1090000001 HH PPS REVENUE CREDIT

## 2024-03-20 PROCEDURE — 1090000001 HH PPS REVENUE CREDIT

## 2024-03-20 PROCEDURE — 1090000002 HH PPS REVENUE DEBIT

## 2024-03-21 ENCOUNTER — OFFICE VISIT (OUTPATIENT)
Dept: PRIMARY CARE | Facility: CLINIC | Age: 89
End: 2024-03-21
Payer: MEDICARE

## 2024-03-21 VITALS
WEIGHT: 209.4 LBS | SYSTOLIC BLOOD PRESSURE: 108 MMHG | DIASTOLIC BLOOD PRESSURE: 72 MMHG | HEART RATE: 62 BPM | BODY MASS INDEX: 29.98 KG/M2 | OXYGEN SATURATION: 97 % | TEMPERATURE: 97.3 F | HEIGHT: 70 IN

## 2024-03-21 DIAGNOSIS — I48.91 ATRIAL FIBRILLATION, UNSPECIFIED TYPE (MULTI): ICD-10-CM

## 2024-03-21 DIAGNOSIS — F32.0 DEPRESSION, MAJOR, SINGLE EPISODE, MILD (CMS-HCC): ICD-10-CM

## 2024-03-21 DIAGNOSIS — I10 ESSENTIAL (PRIMARY) HYPERTENSION: Primary | ICD-10-CM

## 2024-03-21 DIAGNOSIS — E55.9 VITAMIN D INSUFFICIENCY: ICD-10-CM

## 2024-03-21 DIAGNOSIS — E83.42 HYPOMAGNESEMIA: ICD-10-CM

## 2024-03-21 DIAGNOSIS — Z00.00 ENCOUNTER FOR WELLNESS EXAMINATION: ICD-10-CM

## 2024-03-21 DIAGNOSIS — M81.0 AGE RELATED OSTEOPOROSIS, UNSPECIFIED PATHOLOGICAL FRACTURE PRESENCE: ICD-10-CM

## 2024-03-21 DIAGNOSIS — I10 ESSENTIAL HYPERTENSION: ICD-10-CM

## 2024-03-21 DIAGNOSIS — I82.412 ACUTE DEEP VEIN THROMBOSIS (DVT) OF FEMORAL VEIN OF LEFT LOWER EXTREMITY (MULTI): ICD-10-CM

## 2024-03-21 DIAGNOSIS — R39.9 URINARY TRACT INFECTION SYMPTOMS: ICD-10-CM

## 2024-03-21 DIAGNOSIS — E78.2 MIXED DYSLIPIDEMIA: ICD-10-CM

## 2024-03-21 DIAGNOSIS — I50.31 ACUTE DIASTOLIC CONGESTIVE HEART FAILURE (MULTI): ICD-10-CM

## 2024-03-21 DIAGNOSIS — E03.9 HYPOTHYROIDISM, UNSPECIFIED TYPE: ICD-10-CM

## 2024-03-21 DIAGNOSIS — E87.6 HYPOKALEMIA: ICD-10-CM

## 2024-03-21 PROCEDURE — 3074F SYST BP LT 130 MM HG: CPT | Performed by: INTERNAL MEDICINE

## 2024-03-21 PROCEDURE — 1123F ACP DISCUSS/DSCN MKR DOCD: CPT | Performed by: INTERNAL MEDICINE

## 2024-03-21 PROCEDURE — 1111F DSCHRG MED/CURRENT MED MERGE: CPT | Performed by: INTERNAL MEDICINE

## 2024-03-21 PROCEDURE — 1159F MED LIST DOCD IN RCRD: CPT | Performed by: INTERNAL MEDICINE

## 2024-03-21 PROCEDURE — 99214 OFFICE O/P EST MOD 30 MIN: CPT | Performed by: INTERNAL MEDICINE

## 2024-03-21 PROCEDURE — 1090000002 HH PPS REVENUE DEBIT

## 2024-03-21 PROCEDURE — 3078F DIAST BP <80 MM HG: CPT | Performed by: INTERNAL MEDICINE

## 2024-03-21 PROCEDURE — 1090000001 HH PPS REVENUE CREDIT

## 2024-03-21 PROCEDURE — 1036F TOBACCO NON-USER: CPT | Performed by: INTERNAL MEDICINE

## 2024-03-21 RX ORDER — ESTRADIOL 0.1 MG/G
0.01 CREAM VAGINAL NIGHTLY
Qty: 42.5 G | Refills: 1 | Status: SHIPPED | OUTPATIENT
Start: 2024-03-21

## 2024-03-21 RX ORDER — CLONIDINE 0.3 MG/24H
1 PATCH, EXTENDED RELEASE TRANSDERMAL
Qty: 12 PATCH | Refills: 1 | Status: SHIPPED | OUTPATIENT
Start: 2024-03-21 | End: 2024-10-11

## 2024-03-21 RX ORDER — SPIRONOLACTONE 25 MG/1
12.5 TABLET ORAL DAILY
Qty: 90 TABLET | Refills: 1 | Status: SHIPPED | OUTPATIENT
Start: 2024-03-21

## 2024-03-21 RX ORDER — LEVOTHYROXINE SODIUM 50 UG/1
50 TABLET ORAL DAILY
Qty: 90 TABLET | Refills: 1 | Status: SHIPPED | OUTPATIENT
Start: 2024-03-21

## 2024-03-21 RX ORDER — LANOLIN ALCOHOL/MO/W.PET/CERES
400 CREAM (GRAM) TOPICAL DAILY
Qty: 90 TABLET | Refills: 1 | Status: SHIPPED | OUTPATIENT
Start: 2024-03-21 | End: 2024-09-17

## 2024-03-21 RX ORDER — TORSEMIDE 10 MG/1
10 TABLET ORAL
Qty: 36 TABLET | Refills: 1 | Status: SHIPPED | OUTPATIENT
Start: 2024-03-22 | End: 2024-04-01 | Stop reason: SDUPTHER

## 2024-03-21 RX ORDER — METOPROLOL SUCCINATE 25 MG/1
12.5 TABLET, EXTENDED RELEASE ORAL DAILY
Qty: 45 TABLET | Refills: 1 | Status: SHIPPED | OUTPATIENT
Start: 2024-03-21 | End: 2024-04-01 | Stop reason: SDUPTHER

## 2024-03-21 RX ORDER — AMLODIPINE AND OLMESARTAN MEDOXOMIL 10; 40 MG/1; MG/1
1 TABLET ORAL
Qty: 90 TABLET | Refills: 1 | Status: SHIPPED | OUTPATIENT
Start: 2024-03-21

## 2024-03-21 ASSESSMENT — PATIENT HEALTH QUESTIONNAIRE - PHQ9
1. LITTLE INTEREST OR PLEASURE IN DOING THINGS: NOT AT ALL
SUM OF ALL RESPONSES TO PHQ9 QUESTIONS 1 AND 2: 0
2. FEELING DOWN, DEPRESSED OR HOPELESS: NOT AT ALL

## 2024-03-21 ASSESSMENT — ENCOUNTER SYMPTOMS
BLOOD IN STOOL: 0
STRIDOR: 0
ACTIVITY CHANGE: 0
EYE REDNESS: 0
LIGHT-HEADEDNESS: 0
LOSS OF SENSATION IN FEET: 0
DEPRESSION: 0
JOINT SWELLING: 0
CHEST TIGHTNESS: 0
FEVER: 0
HEMATURIA: 0
PALPITATIONS: 0
SPEECH DIFFICULTY: 0
OCCASIONAL FEELINGS OF UNSTEADINESS: 1

## 2024-03-21 NOTE — PROGRESS NOTES
CHIEF COMPLAINT:    Laura Woodward is a 88 y.o. female who presents for Hospital Follow-up (Patient here for hospital discharge follow-up, bilateral leg swelling, discoloration,and a-fib. Patient has existing blood clot in left leg, but no new blood clots. Patient also had cardioversion done due to atrial fibrillation. ).    HISTORY OF PRESENT ILLNESS:  Laura Woodward  is a pleasant 88-year-old female who is somewhat wheelchair-bound came with her daughter after a hospital discharge for follow-up.  She has multiple medical comorbidities.  She recently had DVT and then pulmonary embolism and during that time she also developed atrial fibrillation.  Patient is currently on Eliquis.  She has generalized bodyaches and pains specially the arthritis.  She is not able to function much.  Patient does have urinary incontinence lymphedema and lower extremity swelling.  She is on torsemide and spironolactone.  Patient was in the hospital for acute on chronic diastolic heart failure along with electrolyte disturbance.  She was repleted with potassium and magnesium.  She will have follow-up appointment with her cardiology.  Patient also has difficulty with the patient.  She has eye appointment.  At this time patient needs refill on her medications.  She has switched her care from Dr. Nieves to me since she left our office.        Review of Systems   Constitutional:  Negative for activity change and fever.   HENT:  Negative for hearing loss, nosebleeds and tinnitus.    Eyes:  Negative for redness.   Respiratory:  Negative for chest tightness and stridor.    Cardiovascular:  Negative for chest pain, palpitations and leg swelling.   Gastrointestinal:  Negative for blood in stool.   Endocrine: Negative for cold intolerance.   Genitourinary:  Negative for hematuria.   Musculoskeletal:  Negative for joint swelling.   Skin:  Negative for rash.   Neurological:  Negative for speech difficulty and light-headedness.  "  Psychiatric/Behavioral:  Negative for behavioral problems.      Visit Vitals  /72 (BP Location: Right arm, Patient Position: Sitting, BP Cuff Size: Adult)   Pulse 62   Temp 36.3 °C (97.3 °F) (Temporal)   Ht 1.778 m (5' 10\")   Wt 95 kg (209 lb 6.4 oz) Comment: weight obtained at home scale   SpO2 97%   BMI 30.05 kg/m²   Smoking Status Former   BSA 2.17 m²         Wt Readings from Last 10 Encounters:   03/21/24 95 kg (209 lb 6.4 oz)   03/05/24 96.2 kg (212 lb)   02/26/24 104 kg (230 lb 6.1 oz)   01/12/24 104 kg (230 lb)   10/19/23 104 kg (229 lb 4.5 oz)   08/30/23 104 kg (230 lb)   06/23/22 106 kg (233 lb)   04/19/22 106 kg (233 lb)   09/03/21 113 kg (249 lb 9 oz)   05/27/21 112 kg (247 lb 9.2 oz)       Physical Exam  Constitutional:       General: She is not in acute distress.     Appearance: Normal appearance.   HENT:      Head: Normocephalic.      Right Ear: Tympanic membrane normal.      Left Ear: Tympanic membrane normal.      Mouth/Throat:      Mouth: Mucous membranes are moist.   Cardiovascular:      Rate and Rhythm: Normal rate and regular rhythm.      Heart sounds: No murmur heard.  Pulmonary:      Effort: No respiratory distress.   Abdominal:      Palpations: Abdomen is soft.   Musculoskeletal:      Cervical back: Neck supple.      Right lower leg: No edema.      Left lower leg: No edema.   Skin:     Findings: No rash.   Neurological:      General: No focal deficit present.      Mental Status: She is alert and oriented to person, place, and time.   Psychiatric:         Mood and Affect: Mood normal.        RECENT LABS:  Lab Results   Component Value Date    WBC 3.4 (L) 03/02/2024    HGB 11.4 (L) 03/02/2024    HCT 35.3 (L) 03/02/2024     03/02/2024    CHOL 160 12/08/2022    TRIG 133 12/08/2022    HDL 51.7 12/08/2022    ALT 23 02/26/2024    AST 16 02/26/2024     (H) 03/12/2024    K 3.9 03/12/2024     (H) 03/12/2024    CREATININE 0.99 03/12/2024    BUN 12 03/12/2024    CO2 27 " 03/12/2024    TSH 1.26 02/26/2024    INR 2.1 (H) 02/26/2024     IMAGING:  === 02/26/24 ===    XR CHEST 1 VIEW    - Impression -  1. No acute pulmonary pathology.  2. Left port is unchanged.  Signed by Ildefonso Oneal MD   === 02/26/24 ===    CT ANGIO CHEST FOR PULMONARY EMBOLISM    - Impression -  1. No pulmonary emboli.  2. Small to moderate right pleural effusion.  Signed by Ildefonso Oneal MD   Reviewed:   MEDICATIONS:   Current Outpatient Medications   Medication Instructions    acetaminophen (TYLENOL) 650 mg, oral, Every 6 hours PRN    amlodipine-olmesartan (Celia) 10-40 mg tablet 1 tablet, oral, Daily RT    apixaban (ELIQUIS) 5 mg, oral, 2 times daily    aspirin (NELDA LOW DOSE ASPIRIN) 81 mg, oral, Daily    cetirizine (ZYRTEC) 10 mg, Daily PRN    cloNIDine (Catapres-TTS) 0.3 mg/24 hr patch 1 patch, transdermal, Weekly, Apply one patach on the skin and replace every 7 days, as directed    darifenacin (ENABLEX) 15 mg, oral, Daily    docusate sodium (COLACE) 100 mg, oral, 2 times daily    estradiol (ESTRACE) 0.01 g, vaginal, Nightly, 3 x a week    L.acidophilus-Bifido.longum (Probiotic Pearls Acidophilus) 1 billion cell capsule,delayed release(DR/EC) 1 capsule, oral, Daily    latanoprost (Xalatan) 0.005 % ophthalmic solution 1 drop, Both Eyes, Nightly    levothyroxine (SYNTHROID, LEVOXYL) 50 mcg, oral, Daily    loperamide (IMODIUM A-D) 2 mg, oral, 3 times daily PRN    magnesium oxide (MAG-OX) 400 mg, oral, Daily    metoprolol succinate XL (TOPROL-XL) 12.5 mg, oral, Daily, Do not crush or chew.    nitroglycerin (NITROSTAT) 0.4 mg, sublingual, Every 5 min PRN, May repeat dose every 5 minutes for up to 3 doses total.    omega-3 acid ethyl esters (LOVAZA) 1 g, oral, 2 times daily    Revlimid 5 mg, oral, Daily, As directed per oncology    spironolactone (ALDACTONE) 12.5 mg, oral, Daily    timolol (Timoptic) 0.5 % ophthalmic solution 1 drop, ophthalmic (eye), Every 12 hours    [START ON 3/22/2024] torsemide (DEMADEX)  10 mg, oral, Every Mon/Wed/Fri    traMADol (ULTRAM) 50 mg, oral, Every 8 hours PRN    trospium (SANCTURA) 20 mg, oral, Every 12 hours    valACYclovir (VALTREX) 500 mg, oral, Daily, 1/2 tablet twice daily 3 times a week. M,W,F     vitamin E 400 Units, oral, Daily    zinc sulfate (ZINC-15 ORAL) 1 tablet, oral, Daily      TODAY'S VISIT  DX:   1. Essential (primary) hypertension  CBC and Auto Differential    Magnesium      2. Depression, major, single episode, mild (CMS/HCC)        3. Encounter for wellness examination  Comprehensive Metabolic Panel    Lipid Panel    TSH with reflex to Free T4 if abnormal    Urinalysis with Reflex Microscopic      4. Mixed dyslipidemia  Lipid Panel      5. Vitamin D insufficiency  Vitamin D 25-Hydroxy,Total (for eval of Vitamin D levels)      6. Urinary tract infection symptoms  Urinalysis with Reflex Microscopic      7. Essential hypertension  amlodipine-olmesartan (Celia) 10-40 mg tablet    cloNIDine (Catapres-TTS) 0.3 mg/24 hr patch    metoprolol succinate XL (Toprol-XL) 25 mg 24 hr tablet    spironolactone (Aldactone) 25 mg tablet      8. Acute deep vein thrombosis (DVT) of femoral vein of left lower extremity (CMS/HCC)  apixaban (Eliquis) 5 mg tablet      9. Atrial fibrillation, unspecified type (CMS/HCC)  metoprolol succinate XL (Toprol-XL) 25 mg 24 hr tablet    torsemide (Demadex) 10 mg tablet      10. Hypokalemia  spironolactone (Aldactone) 25 mg tablet    magnesium oxide (Mag-Ox) 400 mg (241.3 mg magnesium) tablet      11. Acute diastolic congestive heart failure (CMS/HCC)  spironolactone (Aldactone) 25 mg tablet    torsemide (Demadex) 10 mg tablet      12. Hypothyroidism, unspecified type  levothyroxine (Synthroid, Levoxyl) 50 mcg tablet      13. Hypomagnesemia  magnesium oxide (Mag-Ox) 400 mg (241.3 mg magnesium) tablet      14. Age related osteoporosis, unspecified pathological fracture presence  estradiol (Estrace) 0.01 % (0.1 mg/gram) vaginal cream         MEDICAL DECISION  MAKING:  - Recent lab work and relevant imaging studies have been reviewed.    - The current active medical co morbidities have been considered.   - Relevant correspondence/notes from specialty consultants were reviewed and discussed with patient.    - Patient was given treatment as per above plan.   - Patient will continue with current medical therapy and plan.   - Medication have been sent for refill.    - Next Follow up in October 2024..

## 2024-03-22 PROCEDURE — 1090000001 HH PPS REVENUE CREDIT

## 2024-03-22 PROCEDURE — 1090000002 HH PPS REVENUE DEBIT

## 2024-03-23 PROCEDURE — 1090000001 HH PPS REVENUE CREDIT

## 2024-03-23 PROCEDURE — 1090000002 HH PPS REVENUE DEBIT

## 2024-03-24 PROCEDURE — 1090000002 HH PPS REVENUE DEBIT

## 2024-03-24 PROCEDURE — 1090000001 HH PPS REVENUE CREDIT

## 2024-03-25 PROCEDURE — 1090000001 HH PPS REVENUE CREDIT

## 2024-03-25 PROCEDURE — 1090000002 HH PPS REVENUE DEBIT

## 2024-03-26 ENCOUNTER — PATIENT OUTREACH (OUTPATIENT)
Dept: PRIMARY CARE | Facility: CLINIC | Age: 89
End: 2024-03-26
Payer: MEDICARE

## 2024-03-26 ENCOUNTER — HOME CARE VISIT (OUTPATIENT)
Dept: HOME HEALTH SERVICES | Facility: HOME HEALTH | Age: 89
End: 2024-03-26
Payer: MEDICARE

## 2024-03-26 PROCEDURE — 1090000001 HH PPS REVENUE CREDIT

## 2024-03-26 PROCEDURE — 1090000002 HH PPS REVENUE DEBIT

## 2024-03-26 ASSESSMENT — PAIN SCALES - PAIN ASSESSMENT IN ADVANCED DEMENTIA (PAINAD)
NEGVOCALIZATION: 0
FACIALEXPRESSION: 0
CONSOLABILITY: 0 - NO NEED TO CONSOLE.
CONSOLABILITY: 0
BODYLANGUAGE: 0
FACIALEXPRESSION: 0 - SMILING OR INEXPRESSIVE.
TOTALSCORE: 0
BODYLANGUAGE: 0 - RELAXED.
NEGVOCALIZATION: 0 - NONE.
BREATHING: 0

## 2024-03-26 ASSESSMENT — ENCOUNTER SYMPTOMS: DENIES PAIN: 1

## 2024-03-26 ASSESSMENT — ACTIVITIES OF DAILY LIVING (ADL)
OASIS_M1830: 03
HOME_HEALTH_OASIS: 02

## 2024-03-26 NOTE — HOME HEALTH
RECEIVED RETURN MESSAGE FROM DAUGHTER THAT SHE WOULD NOT BE ABLE TO SCHEDULE IN HOME VISIT FOR DISCHARGE D/T OTHER APPOINTMENTS. SHE WAS AGREEABLE TO NON-VISIT DISCHARGE. REPORTS NO OTHER CONCERNS AT THIS TIME.  DISCHARGE FROM [facility] SERVICES.

## 2024-03-26 NOTE — PROGRESS NOTES
Unable to reach patient/daughter for call back after patient's follow up appointment with PCP. 3/21/24 & Cardio 3/8/24.    LVM with call back number for patient to call if needed to assist with any questions or concerns patient may have.

## 2024-03-27 PROCEDURE — 1090000001 HH PPS REVENUE CREDIT

## 2024-03-27 PROCEDURE — 1090000002 HH PPS REVENUE DEBIT

## 2024-04-01 DIAGNOSIS — I50.31 ACUTE DIASTOLIC CONGESTIVE HEART FAILURE (MULTI): ICD-10-CM

## 2024-04-01 DIAGNOSIS — I48.91 ATRIAL FIBRILLATION, UNSPECIFIED TYPE (MULTI): ICD-10-CM

## 2024-04-01 DIAGNOSIS — I10 ESSENTIAL HYPERTENSION: ICD-10-CM

## 2024-04-01 RX ORDER — TORSEMIDE 10 MG/1
10 TABLET ORAL
Qty: 36 TABLET | Refills: 1 | Status: SHIPPED | OUTPATIENT
Start: 2024-04-01 | End: 2024-05-03 | Stop reason: SDUPTHER

## 2024-04-01 RX ORDER — METOPROLOL SUCCINATE 25 MG/1
12.5 TABLET, EXTENDED RELEASE ORAL DAILY
Qty: 45 TABLET | Refills: 0 | Status: SHIPPED | OUTPATIENT
Start: 2024-04-01 | End: 2024-05-03 | Stop reason: SDUPTHER

## 2024-04-01 NOTE — TELEPHONE ENCOUNTER
Pt's daughter requesting a short term supply of pended meds be sent to her local pharmacy. Daughter stated that pt is out of those meds as of today. Please advise.

## 2024-04-30 ENCOUNTER — TELEPHONE (OUTPATIENT)
Dept: PRIMARY CARE | Facility: CLINIC | Age: 89
End: 2024-04-30
Payer: MEDICARE

## 2024-04-30 DIAGNOSIS — K08.9 TOOTH DISORDER: Primary | ICD-10-CM

## 2024-04-30 RX ORDER — AMOXICILLIN 500 MG/1
2000 CAPSULE ORAL ONCE
Qty: 4 CAPSULE | Refills: 0 | Status: SHIPPED | OUTPATIENT
Start: 2024-04-30 | End: 2024-04-30

## 2024-04-30 NOTE — TELEPHONE ENCOUNTER
Previous prescriptions given by Dr. Baca have been Amoxicillin 500 mg 4 tablets take one hour prior to dental procedure

## 2024-04-30 NOTE — TELEPHONE ENCOUNTER
Patients daughter called office requesting an antibiotic for her dental appointment due to previous surgeries on Thursday 5/2/2024. Dr. Baca prescribe a prescription last year. Patients daughter is requesting this to be done by tomorrow morning.

## 2024-05-01 ENCOUNTER — PATIENT OUTREACH (OUTPATIENT)
Dept: PRIMARY CARE | Facility: CLINIC | Age: 89
End: 2024-05-01
Payer: MEDICARE

## 2024-05-01 NOTE — PROGRESS NOTES
Successful outreach to patient regarding hospitalization as patient continues TCM program.     At time of outreach call the patient feels as if their condition has improved since initial visit with PCP or specialist.    Questions or concerns addressed at this time with patient.   Provided contact information to patient if any further non-emergent needs arise.      Reviewed upcoming appts and talked about getting a CT scan done again before next cancer center check up

## 2024-05-03 ENCOUNTER — TELEPHONE (OUTPATIENT)
Dept: PRIMARY CARE | Facility: CLINIC | Age: 89
End: 2024-05-03
Payer: MEDICARE

## 2024-05-03 DIAGNOSIS — I48.91 ATRIAL FIBRILLATION, UNSPECIFIED TYPE (MULTI): ICD-10-CM

## 2024-05-03 DIAGNOSIS — I50.31 ACUTE DIASTOLIC CONGESTIVE HEART FAILURE (MULTI): ICD-10-CM

## 2024-05-03 DIAGNOSIS — I10 ESSENTIAL HYPERTENSION: ICD-10-CM

## 2024-05-03 RX ORDER — TORSEMIDE 10 MG/1
10 TABLET ORAL
Qty: 36 TABLET | Refills: 1 | Status: SHIPPED | OUTPATIENT
Start: 2024-05-03 | End: 2025-05-03

## 2024-05-03 RX ORDER — METOPROLOL SUCCINATE 25 MG/1
12.5 TABLET, EXTENDED RELEASE ORAL DAILY
Qty: 45 TABLET | Refills: 0 | Status: SHIPPED | OUTPATIENT
Start: 2024-05-03 | End: 2025-05-03

## 2024-05-03 NOTE — TELEPHONE ENCOUNTER
Daughter (Soraya) called to report that Pt is crippling, sore all over, decreasing , difficulty using walker to get to restroom. The Tylenol Arthritis makes Pt sleepy and not helping with the pain.  Daughter is also her caregiver and is concerned. Pt not take the Celebrex, would like to know if there is an alternative??    Pt does not want topical NSAID.  Please advise.

## 2024-05-30 ENCOUNTER — PATIENT OUTREACH (OUTPATIENT)
Dept: PRIMARY CARE | Facility: CLINIC | Age: 89
End: 2024-05-30
Payer: MEDICARE

## 2024-05-30 NOTE — PROGRESS NOTES
Final call. Called and spoke with patient's daughter Soraya to address and questions or concerns regarding hospitalization.      Patient's daughter encouraged to keep my contact information in case any needs arise.

## 2024-06-05 ENCOUNTER — APPOINTMENT (OUTPATIENT)
Dept: SURGERY | Facility: CLINIC | Age: 89
End: 2024-06-05
Payer: MEDICARE

## 2024-06-11 ENCOUNTER — APPOINTMENT (OUTPATIENT)
Dept: RADIOLOGY | Facility: CLINIC | Age: 89
End: 2024-06-11
Payer: MEDICARE

## 2024-06-19 ENCOUNTER — HOSPITAL ENCOUNTER (OUTPATIENT)
Dept: RADIOLOGY | Facility: CLINIC | Age: 89
Discharge: HOME | End: 2024-06-19
Payer: MEDICARE

## 2024-06-19 DIAGNOSIS — C83.39 DIFFUSE LARGE B-CELL LYMPHOMA, EXTRANODAL AND SOLID ORGAN SITES (MULTI): ICD-10-CM

## 2024-06-19 DIAGNOSIS — Z51.12 ENCOUNTER FOR ANTINEOPLASTIC IMMUNOTHERAPY: ICD-10-CM

## 2024-06-19 DIAGNOSIS — C77.2 SECONDARY AND UNSPECIFIED MALIGNANT NEOPLASM OF INTRA-ABDOMINAL LYMPH NODES (MULTI): ICD-10-CM

## 2024-06-19 PROCEDURE — 3430000001 HC RX 343 DIAGNOSTIC RADIOPHARMACEUTICALS

## 2024-06-19 PROCEDURE — 78816 PET IMAGE W/CT FULL BODY: CPT | Mod: PS

## 2024-06-19 PROCEDURE — A9552 F18 FDG: HCPCS

## 2024-06-19 RX ORDER — FLUDEOXYGLUCOSE F 18 200 MCI/ML
14 INJECTION, SOLUTION INTRAVENOUS
Status: COMPLETED | OUTPATIENT
Start: 2024-06-19 | End: 2024-06-19

## 2024-06-24 ENCOUNTER — TELEPHONE (OUTPATIENT)
Dept: PRIMARY CARE | Facility: CLINIC | Age: 89
End: 2024-06-24
Payer: MEDICARE

## 2024-06-24 NOTE — TELEPHONE ENCOUNTER
6/25/2024 call to Pt daughter (Soraya) to provide advise. Verbalized understanding. Will start tumeric. Thanked for calling back.     Received call from Daughter (Soraya) to ask about OTC arthritis med her mother can take to help with pain.    Cannot take Celebrex since on Eliquis.   Can Pt take Tumeric?    Call to Daughter to left know we received message.    Pt is taking the 375 mg mg of ASA x 3 doses every 8 hrs PRN. Daughter states Pt hands still hurt. Suggested can we schedule appt to be seen. Daughter states not now, we been out on several appts dental, etc.  Please advise.

## 2024-07-29 ENCOUNTER — TELEPHONE (OUTPATIENT)
Dept: CARDIOLOGY | Facility: CLINIC | Age: 89
End: 2024-07-29
Payer: MEDICARE

## 2024-07-29 NOTE — TELEPHONE ENCOUNTER
Patient's daughter called and LM stating Dr. Danny Hsu MD, FACC advised the patient not to take celebrex anymore d/t being on Eliquis. The patient is having a hard time with arthritis. She has been taking Tylenol but this does nothing for it. They are asking for recommendations on what the patient can take so they can ask her PCP for it. Routed to Yonis Johns RN

## 2024-07-30 NOTE — TELEPHONE ENCOUNTER
[facility]:  Laura Woodward is a 88 y.o. year old female patient was discharged in the hospital after admission for what appears to be leg swelling.  Tests were done echo showed moderate aortic and mild regurgitation.  She does have some pleural effusion and she is currently on diuretics.  She stated that she has been having diarrhea although she took antibiotics for 14 days prior to admission for respiratory infection.  I had a very lengthy discussion with the patient today and her daughter.  Her daughter thinks that medication are causing diarrhea.  I told her we need to rule out C. difficile since she took longer duration of antibiotics.  I asked her to follow-up with primary care regarding possible stool culture.  She insist that she wants to hold one of the cardiac medication therefore I told her to hold the spironolactone for now and reassess his symptoms.  The patient understood.  Will call if any problem and follow-up as scheduled the patient had atrial fibrillation cardioverted to sinus rhythm currently today's EKG shows normal sinus rhythm and she is on Eliquis.     Forwarding to [facility] for input.

## 2024-07-31 NOTE — TELEPHONE ENCOUNTER
Danny Hsu MD  [employer] (3:48 PM)     The only medication outside Tylenol is safe with the blood thinner as pain pills such as Percocet or oxycodone

## 2024-08-01 NOTE — TELEPHONE ENCOUNTER
Patient's daughter notified that the patient can not take celebrex while on eliquis.  I told her that Dr. Danny Hsu M.D.  is recommending to call her PCP to get an RX for pain medication.  She verbalized understanding.

## 2024-08-02 ENCOUNTER — HOSPITAL ENCOUNTER (OUTPATIENT)
Dept: RADIATION ONCOLOGY | Age: 89
Discharge: HOME | End: 2024-08-02
Payer: MEDICARE

## 2024-08-02 VITALS
HEIGHT: 70 IN | RESPIRATION RATE: 18 BRPM | SYSTOLIC BLOOD PRESSURE: 138 MMHG | BODY MASS INDEX: 32.93 KG/M2 | DIASTOLIC BLOOD PRESSURE: 82 MMHG | OXYGEN SATURATION: 97 % | TEMPERATURE: 97.5 F | WEIGHT: 230 LBS | HEART RATE: 66 BPM

## 2024-08-02 DIAGNOSIS — C83.33 DIFFUSE LARGE B-CELL LYMPHOMA OF INTRA-ABDOMINAL LYMPH NODES (HCC): Primary | ICD-10-CM

## 2024-08-02 PROCEDURE — 99205 OFFICE O/P NEW HI 60 MIN: CPT | Performed by: RADIOLOGY

## 2024-08-02 PROCEDURE — 99212 OFFICE O/P EST SF 10 MIN: CPT | Performed by: RADIOLOGY

## 2024-08-02 PROCEDURE — 99497 ADVNCD CARE PLAN 30 MIN: CPT | Performed by: RADIOLOGY

## 2024-08-02 RX ORDER — METOPROLOL SUCCINATE 25 MG/1
25 TABLET, EXTENDED RELEASE ORAL DAILY
COMMUNITY

## 2024-08-02 NOTE — PROGRESS NOTES
NURSING ASSESSMENT     Date: 2024        Patient Name: Alize Christensen     YOB: 1935      Age:  89 y.o.      MRN: [id number]       Chaperone [x] Yes   [] No  daughter-Andra    Advance Directives:   Do you currently have completed advance directives (living will)? [x] Yes   [] No         *If yes, please bring us a copy for your records.  *If no, would you like info or assistance in completing advance directives (living will)?   [] Yes   [x] No    Pain Score:   Pain Score (1-10): bad since she was taken off celebrex  Pain Location: left knee, neck, hands   Pain Duration: all the time   Pain Management/Control: left knee brace, tylenol      Is pain affecting your ability to take care of yourself or move throughout your home?                        [x] Yes   [] No    General: Fatigue  Patient has gained weight [] Yes   [] No  Patient has lost weight [] Yes   [x] No  How much weight in pounds and over what length of time:     Eyes (Ophthalmic): No Problem     Skin (Dermatological): since on chemo has been getting spots all over the body     ENT: No Problems     Respiratory: No Problems     Cardiovascular: occasional angina, has nitroglycerin to use if needed, edema lower left lower leg and feet, does blister, has infusaport left chest      Device   [] Yes   [x] No   Copy of Card Obtained [] Yes   [x] No    Gastrointestinal: Constipation on occasion, uses stool softeners prn, tenderness both sides of lower abdomen    Genito-Urinary: states has frequency but some urge incontinence, vaginal dryness     Breast: No Problems     Musculoskeletal: Joint Pain especially left knee, neck, hands    Neurological: forgetful at times      Hematological and Lymphatic: Easy Bruising on eliquis     Endocrine: Thyroid Problems    Gyn History:   /Para: 3/2  Age of Menarche:   Age of Menopause 37  Vaginal Bleeding:  no  First Pregnancy:  Breast Feeding:  no  Last Menstrual period:   Oral Contraceptives:

## 2024-08-05 DIAGNOSIS — M81.0 AGE RELATED OSTEOPOROSIS, UNSPECIFIED PATHOLOGICAL FRACTURE PRESENCE: ICD-10-CM

## 2024-08-05 DIAGNOSIS — I50.31 ACUTE DIASTOLIC CONGESTIVE HEART FAILURE (MULTI): ICD-10-CM

## 2024-08-05 DIAGNOSIS — I48.91 ATRIAL FIBRILLATION, UNSPECIFIED TYPE (MULTI): ICD-10-CM

## 2024-08-05 DIAGNOSIS — E87.6 HYPOKALEMIA: ICD-10-CM

## 2024-08-05 DIAGNOSIS — I10 ESSENTIAL HYPERTENSION: ICD-10-CM

## 2024-08-05 RX ORDER — SPIRONOLACTONE 25 MG/1
12.5 TABLET ORAL DAILY
Qty: 90 TABLET | Refills: 1 | Status: SHIPPED | OUTPATIENT
Start: 2024-08-05

## 2024-08-05 RX ORDER — ESTRADIOL 0.1 MG/G
0.01 CREAM VAGINAL NIGHTLY
Qty: 42.5 G | Refills: 1 | Status: SHIPPED | OUTPATIENT
Start: 2024-08-05

## 2024-08-05 RX ORDER — METOPROLOL SUCCINATE 25 MG/1
12.5 TABLET, EXTENDED RELEASE ORAL DAILY
Qty: 90 TABLET | Refills: 1 | Status: SHIPPED | OUTPATIENT
Start: 2024-08-05 | End: 2025-08-05

## 2024-08-05 RX ORDER — TORSEMIDE 10 MG/1
10 TABLET ORAL
Qty: 36 TABLET | Refills: 3 | Status: SHIPPED | OUTPATIENT
Start: 2024-08-05 | End: 2025-08-05

## 2024-08-05 NOTE — TELEPHONE ENCOUNTER
Last OV: 3-  Pending OV: None    Daughter (Soraya) called to report received email from [facility] that a hold needs to be approved by Fernando. Medications are on hold.    Call to Daughter to inform received call. Verbalized understanding. (Daughter works 3 pm-11 pm) inquired about which medications are needed as the office does not have a faxed copy to refer. Obtained the refill meds to be sent to [facility].

## 2024-08-07 PROBLEM — C83.33 DIFFUSE LARGE B-CELL LYMPHOMA OF INTRA-ABDOMINAL LYMPH NODES (HCC): Status: ACTIVE | Noted: 2024-08-07

## 2024-08-07 NOTE — CONSULTS
[facility]           Radiation Oncology      [id number] [place], [place] [id number]        O: [phone]        F: [phone]       [facility]                   Dr. Jesu Blanton MD PhD    CONSULT NOTE     Date of Service: 2024  Patient ID: Alize Christensen   : 1935  MRN: [id number]   Acct Number: [id number]       Alize Christensen  89 y.o.   1935    REFERRING PROVIDER: Raissa    PCP:  Naeem Mace MD    DIAGNOSIS:  1. 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 Alize Christensen  is a 89 y.o. year old female with multiple comorbidities including hypertension, left lower extremity DVT (on Eliquis), prolapsed bladder, arthritis, hypothyroidism, bilateral knee replacements, and diffuse large B-cell lymphoma initially diagnosed in  with slow progression on multiple systemic therapy regimens.  She presents to discuss the role and rationale of palliative radiation therapy to a new intensely hypermetabolic mesenteric soft tissue mass in the right lower abdominal quadrant consistent with lymphomatous recurrence.    She was initially diagnosed in  and underwent R-CHOP x 6 cycles.  She had a relapse in the abdominal lymph nodes in 2017 and received the Revlimid and Rituxan.  She established care with [facility] medical oncology on 3/1/2023 with maintenance Revlimid and rituximab recommended.  Patient had a ventral hernia repair on 10/2023 and experienced a left lower extremity DVT for which she was started on Eliquis.  She began to note progressively worsening abdominal pain after her hernia repair.  On 2024 CT of

## 2024-08-09 ENCOUNTER — HOSPITAL ENCOUNTER (OUTPATIENT)
Dept: RADIATION ONCOLOGY | Age: 89
Discharge: HOME | End: 2024-08-09
Payer: MEDICARE

## 2024-08-09 DIAGNOSIS — C83.33 DIFFUSE LARGE B-CELL LYMPHOMA OF INTRA-ABDOMINAL LYMPH NODES (HCC): Primary | ICD-10-CM

## 2024-08-09 PROCEDURE — 77334 RADIATION TREATMENT AID(S): CPT | Performed by: RADIOLOGY

## 2024-08-09 NOTE — PROGRESS NOTES
Teaching & Instructions - ABDOMEN  General  Simulation  Initial Treatment  Self-Care Info  Nutrition  Social Service    Site-Specific  Side Effects  Diminished Apetite  Nausea  Vomiting  Fatigue Mgmt  Loose and / or more frequent stools  Abdominal Cramping with or without gas    Educational Handouts  Radiation Therapy & You  Site Specific Instructions  Radiation Oncology Dept Information  CBMS Program    Patient and her daughter, Bren eager to learn, verbalized understanding of verbal education and handouts.

## 2024-08-13 PROCEDURE — 77338 DESIGN MLC DEVICE FOR IMRT: CPT | Performed by: RADIOLOGY

## 2024-08-13 PROCEDURE — 77301 RADIOTHERAPY DOSE PLAN IMRT: CPT | Performed by: RADIOLOGY

## 2024-08-13 PROCEDURE — 77300 RADIATION THERAPY DOSE PLAN: CPT | Performed by: RADIOLOGY

## 2024-08-13 PROCEDURE — 77293 RESPIRATOR MOTION MGMT SIMUL: CPT | Performed by: RADIOLOGY

## 2024-08-13 RX ORDER — ONDANSETRON 4 MG/1
TABLET, ORALLY DISINTEGRATING ORAL
Qty: 40 TABLET | Refills: 0 | Status: SHIPPED | OUTPATIENT
Start: 2024-08-13

## 2024-08-14 ENCOUNTER — HOSPITAL ENCOUNTER (OUTPATIENT)
Dept: RADIATION ONCOLOGY | Age: 89
Discharge: HOME | End: 2024-08-14
Payer: MEDICARE

## 2024-08-14 DIAGNOSIS — C83.33 DIFFUSE LARGE B-CELL LYMPHOMA OF INTRA-ABDOMINAL LYMPH NODES (HCC): Primary | ICD-10-CM

## 2024-08-14 PROCEDURE — 77386 HC NTSTY MODUL RAD TX DLVR CPLX: CPT | Performed by: RADIOLOGY

## 2024-08-15 ENCOUNTER — HOSPITAL ENCOUNTER (OUTPATIENT)
Dept: RADIATION ONCOLOGY | Age: 89
Discharge: HOME | End: 2024-08-15
Payer: MEDICARE

## 2024-08-15 VITALS
OXYGEN SATURATION: 99 % | HEART RATE: 60 BPM | DIASTOLIC BLOOD PRESSURE: 69 MMHG | RESPIRATION RATE: 16 BRPM | SYSTOLIC BLOOD PRESSURE: 134 MMHG

## 2024-08-15 DIAGNOSIS — C83.33 DIFFUSE LARGE B-CELL LYMPHOMA OF INTRA-ABDOMINAL LYMPH NODES (HCC): Primary | ICD-10-CM

## 2024-08-15 PROCEDURE — 77386 HC NTSTY MODUL RAD TX DLVR CPLX: CPT | Performed by: RADIOLOGY

## 2024-08-15 PROCEDURE — 77014 CHG CT GUIDANCE RADIATION THERAPY FLDS PLACEMENT: CPT | Performed by: RADIOLOGY

## 2024-08-15 NOTE — PROGRESS NOTES
Harvinder.    MEDICATIONS:     Current Outpatient Medications   Medication Sig Dispense Refill    ondansetron (ZOFRAN-ODT) 4 MG disintegrating tablet Take 1 tablet 1 hour prior to each radiation treatment. Otherwise may take every 8 hours as needed for nausea. 40 tablet 0    metoprolol succinate (TOPROL XL) 25 MG extended release tablet Take 1 tablet by mouth daily      TORSEMIDE PO Take by mouth three times a week      acetaminophen (TYLENOL) 325 MG tablet Take 2 tablets by mouth 3 times daily as needed      amLODIPine-olmesartan (ELLE) 10-40 MG per tablet Take 1 tablet by mouth daily      Cetirizine HCl 10 MG CAPS Take 10 mg by mouth daily      cloNIDine (CATAPRES) 0.3 MG/24HR PTWK Place 1 patch onto the skin once a week      lenalidomide (REVLIMID) 10 MG chemo capsule Take 5 mg by mouth      magnesium oxide (MAG-OX) 400 MG tablet Take 1 tablet by mouth daily      Timolol Maleate PF 0.5 % SOLN 1 drop 2 times daily      levothyroxine (SYNTHROID) 50 MCG tablet Take 1 tablet by mouth Daily      latanoprost (XALATAN) 0.005 % ophthalmic solution Place 1 drop into both eyes nightly      Biotin 5000 MCG TABS Take 1 tablet by mouth daily      Apoaequorin (PREVAGEN EXTRA STRENGTH) 20 MG CAPS Take 1 tablet by mouth daily      docusate sodium (COLACE) 100 MG capsule Take 1 capsule by mouth 2 times daily 60 capsule 0    vitamin E 180 MG (400 UNIT) CAPS capsule Take 1 capsule by mouth daily (Patient not taking: Reported on 8/15/2024)      hydrocortisone (ANUSOL-HC) 25 MG suppository Place 1 suppository rectally 2 times daily (Patient not taking: Reported on 8/15/2024) 10 suppository 1     No current facility-administered medications for this encounter.     * New    PHYSICAL EXAM:       {ECOGPFS:61048::\"ECO - Asymptomatic (Fully active, able to carry on all pre-disease activities without restriction)\"}    {RTPEOTV:58291}    Chemotherapy Update: {EOTSysTx:52894}    Treatment Imaging: {OTVRTIM}    ASSESSMENT:  affect.  HEENT: Normocephalic, atraumatic  Thorax:  Unlabored  Abdomen:  Non-distended    Chemotherapy Update: None    Treatment Imaging: CBCT    ASSESSMENT: Minimal radiation side effects. Responding appropriately to symptomatic management.    New medications, diagnostic results: Continue treatment as planned    PLAN: Again reviewed potential side effects of radiation for the patient's treatment.    Continue local/topical care. Continue to hold chemo while on RT.    Continue current radiation course as prescribed.

## 2024-08-16 ENCOUNTER — HOSPITAL ENCOUNTER (OUTPATIENT)
Dept: RADIATION ONCOLOGY | Age: 89
Discharge: HOME | End: 2024-08-16
Payer: MEDICARE

## 2024-08-16 PROCEDURE — 77386 HC NTSTY MODUL RAD TX DLVR CPLX: CPT | Performed by: RADIOLOGY

## 2024-08-19 ENCOUNTER — HOSPITAL ENCOUNTER (OUTPATIENT)
Dept: RADIATION ONCOLOGY | Age: 89
Discharge: HOME | End: 2024-08-19
Payer: MEDICARE

## 2024-08-19 PROCEDURE — 77386 HC NTSTY MODUL RAD TX DLVR CPLX: CPT | Performed by: RADIOLOGY

## 2024-08-19 PROCEDURE — 77014 CHG CT GUIDANCE RADIATION THERAPY FLDS PLACEMENT: CPT | Performed by: RADIOLOGY

## 2024-08-20 ENCOUNTER — HOSPITAL ENCOUNTER (OUTPATIENT)
Dept: RADIATION ONCOLOGY | Age: 89
Discharge: HOME | End: 2024-08-20
Payer: MEDICARE

## 2024-08-20 PROCEDURE — 77386 HC NTSTY MODUL RAD TX DLVR CPLX: CPT | Performed by: RADIOLOGY

## 2024-08-20 PROCEDURE — 77014 CHG CT GUIDANCE RADIATION THERAPY FLDS PLACEMENT: CPT | Performed by: RADIOLOGY

## 2024-08-20 PROCEDURE — 77427 RADIATION TX MANAGEMENT X5: CPT | Performed by: RADIOLOGY

## 2024-08-20 PROCEDURE — 77336 RADIATION PHYSICS CONSULT: CPT | Performed by: RADIOLOGY

## 2024-08-21 ENCOUNTER — HOSPITAL ENCOUNTER (OUTPATIENT)
Dept: RADIATION ONCOLOGY | Age: 89
Discharge: HOME | End: 2024-08-21
Payer: MEDICARE

## 2024-08-21 PROCEDURE — 77386 HC NTSTY MODUL RAD TX DLVR CPLX: CPT | Performed by: RADIOLOGY

## 2024-08-22 ENCOUNTER — HOSPITAL ENCOUNTER (OUTPATIENT)
Dept: RADIATION ONCOLOGY | Age: 89
Discharge: HOME | End: 2024-08-22
Payer: MEDICARE

## 2024-08-22 VITALS
HEART RATE: 73 BPM | DIASTOLIC BLOOD PRESSURE: 68 MMHG | RESPIRATION RATE: 18 BRPM | TEMPERATURE: 98 F | OXYGEN SATURATION: 99 % | SYSTOLIC BLOOD PRESSURE: 122 MMHG

## 2024-08-22 DIAGNOSIS — C83.33 DIFFUSE LARGE B-CELL LYMPHOMA OF INTRA-ABDOMINAL LYMPH NODES (HCC): Primary | ICD-10-CM

## 2024-08-22 PROCEDURE — 77386 HC NTSTY MODUL RAD TX DLVR CPLX: CPT | Performed by: RADIOLOGY

## 2024-08-22 NOTE — PROGRESS NOTES
[facility]           Radiation Oncology          [address] [place], [place] [address]       O: [phone]       F: [phone]      [facility]                   Dr. 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     Wt Readings from Last 3 Encounters:   24 104.3 kg (230 lb)   22 104.3 kg (230 lb)       /68   Pulse 73   Temp 98 °F (36.7 °C) (Temporal)   Resp 18   SpO2 99%       Comfort Alteration  Fatigue: reports increased fatigue this week.     Pain Location:Reports lower abdominal pain after treatments  Pain Intensity (Current): 0 No Pain  Pain Treatment: tylenol  Pain Relief: reports relief with tylenol.     Emotional Alteration:   Coping: effective    Nutritional Alteration  Anorexia: none   Nausea: No nausea noted  Vomiting: No vomiting     Skin Alteration   Skin reaction: No changes noted    Elimination Alterations  Urinary Frequency: Ongoing urinary frequency.  Urinary Retention: Normal  Urinary Incontinence: using pads for urgency incontinence  Dysuria: ongoing slight burning  Nocturia: nocturia x2  Proctitis: ongoing occasional blood with wiping after  bowel movements from hemorrhoids.   Diarrhea: reports normal consistency bowel movements. Denies any changes.     Additional Comments:     MEDICATIONS:     Current Outpatient Medications   Medication Sig Dispense Refill    apixaban (ELIQUIS) 5 MG TABS tablet Take 1 tablet by mouth 2

## 2024-08-23 ENCOUNTER — APPOINTMENT (OUTPATIENT)
Dept: RADIATION ONCOLOGY | Age: 89
End: 2024-08-23
Payer: MEDICARE

## 2024-08-23 ENCOUNTER — HOSPITAL ENCOUNTER (OUTPATIENT)
Dept: RADIATION ONCOLOGY | Age: 89
Discharge: HOME | End: 2024-08-23
Payer: MEDICARE

## 2024-08-23 VITALS — WEIGHT: 228 LBS | BODY MASS INDEX: 32.71 KG/M2

## 2024-08-23 PROCEDURE — 77386 HC NTSTY MODUL RAD TX DLVR CPLX: CPT | Performed by: RADIOLOGY

## 2024-08-26 ENCOUNTER — HOSPITAL ENCOUNTER (OUTPATIENT)
Dept: RADIATION ONCOLOGY | Age: 89
Discharge: HOME | End: 2024-08-26
Payer: MEDICARE

## 2024-08-26 PROCEDURE — 77386 HC NTSTY MODUL RAD TX DLVR CPLX: CPT | Performed by: RADIOLOGY

## 2024-08-26 PROCEDURE — 77014 CHG CT GUIDANCE RADIATION THERAPY FLDS PLACEMENT: CPT | Performed by: RADIOLOGY

## 2024-08-27 ENCOUNTER — HOSPITAL ENCOUNTER (OUTPATIENT)
Dept: RADIATION ONCOLOGY | Age: 89
Discharge: HOME | End: 2024-08-27
Payer: MEDICARE

## 2024-08-27 PROCEDURE — 77014 CHG CT GUIDANCE RADIATION THERAPY FLDS PLACEMENT: CPT | Performed by: RADIOLOGY

## 2024-08-27 PROCEDURE — 77336 RADIATION PHYSICS CONSULT: CPT | Performed by: RADIOLOGY

## 2024-08-27 PROCEDURE — 77386 HC NTSTY MODUL RAD TX DLVR CPLX: CPT | Performed by: RADIOLOGY

## 2024-08-28 ENCOUNTER — HOSPITAL ENCOUNTER (OUTPATIENT)
Dept: RADIATION ONCOLOGY | Age: 89
Discharge: HOME | End: 2024-08-28
Payer: MEDICARE

## 2024-08-28 PROCEDURE — 77386 HC NTSTY MODUL RAD TX DLVR CPLX: CPT | Performed by: RADIOLOGY

## 2024-08-29 ENCOUNTER — HOSPITAL ENCOUNTER (OUTPATIENT)
Dept: RADIATION ONCOLOGY | Age: 89
Discharge: HOME | End: 2024-08-29
Payer: MEDICARE

## 2024-08-29 VITALS
WEIGHT: 228 LBS | TEMPERATURE: 99.1 F | RESPIRATION RATE: 18 BRPM | OXYGEN SATURATION: 97 % | DIASTOLIC BLOOD PRESSURE: 82 MMHG | SYSTOLIC BLOOD PRESSURE: 134 MMHG | BODY MASS INDEX: 32.71 KG/M2 | HEART RATE: 65 BPM

## 2024-08-29 DIAGNOSIS — C83.33 DIFFUSE LARGE B-CELL LYMPHOMA OF INTRA-ABDOMINAL LYMPH NODES (HCC): Primary | ICD-10-CM

## 2024-08-29 PROCEDURE — 77386 HC NTSTY MODUL RAD TX DLVR CPLX: CPT | Performed by: RADIOLOGY

## 2024-08-29 RX ORDER — SIMETHICONE 80 MG
80 TABLET,CHEWABLE ORAL EVERY 6 HOURS PRN
COMMUNITY

## 2024-08-29 NOTE — PROGRESS NOTES
[facility]           Radiation Oncology          [address] [place], [place] [address]       O: [phone]       F: [phone]      [facility]                   Dr. 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     Wt Readings from Last 3 Encounters:   24 103.4 kg (228 lb)   24 103.4 kg (228 lb)   24 104.3 kg (230 lb)       /82   Pulse 65   Temp 99.1 °F (37.3 °C)   Resp 18   Wt 103.4 kg (228 lb)   SpO2 97%   BMI 32.71 kg/m²       Comfort Alteration  Fatigue:Able to perform daily activities with rest periods    Pain Location: neck discomfort when on the table, mild at this time  Pain Intensity (Current):  mild  Pain Treatment: OTC Medications  Pain Relief:  take the edge    Emotional Alteration:   Coping: effective    Nutritional Alteration  Anorexia: appetite is diminished a little  Nausea: No nausea noted  Vomiting: No vomiting     Skin Alteration   Skin reaction: No changes noted    Elimination Alterations  Urinary Frequency: None  Urinary Retention: Normal  Urinary Incontinence:  wears incontinence pads for urinary leakage, baseline , takes enablex when leaves home  Dysuria:  baseline  Nocturia: 1-3 times nightly, baseline  Proctitis: had bowel movement this am, with bright red blood from hemorrhoids which is baseline  Diarrhea: None    Additional Comments: .    MEDICATIONS:     Current Outpatient Medications   Medication Sig  hours)    General: NAD, AO x 3, Mentation is clear with appropriate affect.  HEENT: Normocephalic, atraumatic  Thorax:  Unlabored  Abdomen:  Non-distended    Chemotherapy Update: None    Treatment Imaging: CBCT    ASSESSMENT: Modest radiation side effects. Responding appropriately to symptomatic management.    New medications, diagnostic results: Continue treatment as planned    PLAN: Again reviewed potential side effects of radiation for the patient's treatment.    Continue local/topical care.  Patient will be completing radiation therapy tomorrow.  I will coordinate with medical oncology regarding this.  I recommended the patient resume her Revlimid on Monday.  I will see her back for a quick check in 1 month.  In the interim she knows that we remain available should she have any additional questions or concerns.   Continue current radiation course as prescribed.

## 2024-08-30 ENCOUNTER — HOSPITAL ENCOUNTER (OUTPATIENT)
Dept: RADIATION ONCOLOGY | Age: 89
Discharge: HOME | End: 2024-08-30
Payer: MEDICARE

## 2024-08-30 NOTE — PROGRESS NOTES
Discharge instructions provided and reviewed with patient and daughter.  Verbalized understanding.  Pt will follow up in a month with Dr Blanton.

## 2024-08-30 NOTE — PROGRESS NOTES
[facility]           Radiation Oncology      [address] [place], [place] [address]        O: [phone]        F: [phone]       [place] [facility]                   Dr. 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    Department of Radiation Oncology  [facility]

## 2024-09-06 ENCOUNTER — APPOINTMENT (OUTPATIENT)
Dept: CARDIOLOGY | Facility: CLINIC | Age: 89
End: 2024-09-06
Payer: MEDICARE

## 2024-10-01 ENCOUNTER — APPOINTMENT (OUTPATIENT)
Dept: CARDIOLOGY | Facility: CLINIC | Age: 89
End: 2024-10-01
Payer: MEDICARE

## 2024-10-01 ENCOUNTER — LAB (OUTPATIENT)
Dept: LAB | Facility: LAB | Age: 89
End: 2024-10-01
Payer: MEDICARE

## 2024-10-01 ENCOUNTER — TELEPHONE (OUTPATIENT)
Dept: PRIMARY CARE | Facility: CLINIC | Age: 89
End: 2024-10-01
Payer: MEDICARE

## 2024-10-01 DIAGNOSIS — R39.9 UTI SYMPTOMS: ICD-10-CM

## 2024-10-01 LAB
APPEARANCE UR: CLEAR
BILIRUB UR STRIP.AUTO-MCNC: NEGATIVE MG/DL
COLOR UR: NORMAL
GLUCOSE UR STRIP.AUTO-MCNC: NORMAL MG/DL
KETONES UR STRIP.AUTO-MCNC: NEGATIVE MG/DL
LEUKOCYTE ESTERASE UR QL STRIP.AUTO: NEGATIVE
NITRITE UR QL STRIP.AUTO: NEGATIVE
PH UR STRIP.AUTO: 5.5 [PH]
PROT UR STRIP.AUTO-MCNC: NEGATIVE MG/DL
RBC # UR STRIP.AUTO: NEGATIVE /UL
SP GR UR STRIP.AUTO: 1.01
UROBILINOGEN UR STRIP.AUTO-MCNC: NORMAL MG/DL

## 2024-10-01 PROCEDURE — 81003 URINALYSIS AUTO W/O SCOPE: CPT

## 2024-10-01 PROCEDURE — 87086 URINE CULTURE/COLONY COUNT: CPT

## 2024-10-01 NOTE — TELEPHONE ENCOUNTER
Patient's daughter stopped into the office to advise that she thinks her mother has a UTI. Soraya is asking for orders to have patients urine checked.   Per verbal from Dr. Trice acharya to put in orders for UA and culture.     Pended orders.

## 2024-10-02 ENCOUNTER — APPOINTMENT (OUTPATIENT)
Dept: CARDIOLOGY | Facility: HOSPITAL | Age: 89
End: 2024-10-02
Payer: MEDICARE

## 2024-10-02 ENCOUNTER — HOSPITAL ENCOUNTER (INPATIENT)
Facility: HOSPITAL | Age: 89
LOS: 1 days | Discharge: HOME | End: 2024-10-04
Attending: EMERGENCY MEDICINE | Admitting: INTERNAL MEDICINE
Payer: MEDICARE

## 2024-10-02 ENCOUNTER — APPOINTMENT (OUTPATIENT)
Dept: RADIOLOGY | Facility: HOSPITAL | Age: 89
End: 2024-10-02
Payer: MEDICARE

## 2024-10-02 DIAGNOSIS — N39.0 UTI (URINARY TRACT INFECTION) WITH PYURIA: Primary | ICD-10-CM

## 2024-10-02 DIAGNOSIS — R94.31 ECG ABNORMAL: ICD-10-CM

## 2024-10-02 DIAGNOSIS — R07.89 MUSCULOSKELETAL CHEST PAIN: ICD-10-CM

## 2024-10-02 DIAGNOSIS — N39.46 MIXED STRESS AND URGE URINARY INCONTINENCE: ICD-10-CM

## 2024-10-02 DIAGNOSIS — R07.9 CHEST PAIN, UNSPECIFIED TYPE: ICD-10-CM

## 2024-10-02 DIAGNOSIS — M19.041 PRIMARY OSTEOARTHRITIS OF BOTH HANDS: ICD-10-CM

## 2024-10-02 DIAGNOSIS — M19.042 PRIMARY OSTEOARTHRITIS OF BOTH HANDS: ICD-10-CM

## 2024-10-02 LAB
ALBUMIN SERPL BCP-MCNC: 3.5 G/DL (ref 3.4–5)
ALP SERPL-CCNC: 78 U/L (ref 33–136)
ALT SERPL W P-5'-P-CCNC: 6 U/L (ref 7–45)
AMORPH CRY #/AREA UR COMP ASSIST: ABNORMAL /HPF
ANION GAP SERPL CALC-SCNC: 11 MMOL/L (ref 10–20)
APPEARANCE UR: CLEAR
AST SERPL W P-5'-P-CCNC: 11 U/L (ref 9–39)
ATRIAL RATE: 61 BPM
BACTERIA #/AREA URNS AUTO: ABNORMAL /HPF
BACTERIA UR CULT: NORMAL
BASOPHILS # BLD AUTO: 0.09 X10*3/UL (ref 0–0.1)
BASOPHILS NFR BLD AUTO: 1.8 %
BILIRUB DIRECT SERPL-MCNC: 0.1 MG/DL (ref 0–0.3)
BILIRUB SERPL-MCNC: 0.5 MG/DL (ref 0–1.2)
BUN SERPL-MCNC: 12 MG/DL (ref 6–23)
CALCIUM SERPL-MCNC: 8.1 MG/DL (ref 8.6–10.3)
CARDIAC TROPONIN I PNL SERPL HS: 7 NG/L (ref 0–13)
CARDIAC TROPONIN I PNL SERPL HS: 8 NG/L (ref 0–13)
CHLORIDE SERPL-SCNC: 112 MMOL/L (ref 98–107)
CO2 SERPL-SCNC: 22 MMOL/L (ref 21–32)
COLOR UR: ABNORMAL
CREAT SERPL-MCNC: 0.83 MG/DL (ref 0.5–1.05)
EGFRCR SERPLBLD CKD-EPI 2021: 68 ML/MIN/1.73M*2
EOSINOPHIL # BLD AUTO: 0.08 X10*3/UL (ref 0–0.4)
EOSINOPHIL NFR BLD AUTO: 1.6 %
ERYTHROCYTE [DISTWIDTH] IN BLOOD BY AUTOMATED COUNT: 14 % (ref 11.5–14.5)
GLUCOSE SERPL-MCNC: 88 MG/DL (ref 74–99)
HCT VFR BLD AUTO: 37.8 % (ref 36–46)
HGB BLD-MCNC: 11.8 G/DL (ref 12–16)
HOLD SPECIMEN: NORMAL
HOLD SPECIMEN: NORMAL
HYALINE CASTS #/AREA URNS AUTO: ABNORMAL /LPF
IMM GRANULOCYTES # BLD AUTO: 0.03 X10*3/UL (ref 0–0.5)
IMM GRANULOCYTES NFR BLD AUTO: 0.6 % (ref 0–0.9)
LACTATE SERPL-SCNC: 0.6 MMOL/L (ref 0.4–2)
LIPASE SERPL-CCNC: 16 U/L (ref 9–82)
LYMPHOCYTES # BLD AUTO: 2.17 X10*3/UL (ref 0.8–3)
LYMPHOCYTES NFR BLD AUTO: 43.6 %
MAGNESIUM SERPL-MCNC: 1.37 MG/DL (ref 1.6–2.4)
MCH RBC QN AUTO: 34.3 PG (ref 26–34)
MCHC RBC AUTO-ENTMCNC: 31.2 G/DL (ref 32–36)
MCV RBC AUTO: 110 FL (ref 80–100)
MONOCYTES # BLD AUTO: 0.69 X10*3/UL (ref 0.05–0.8)
MONOCYTES NFR BLD AUTO: 13.9 %
MUCOUS THREADS #/AREA URNS AUTO: ABNORMAL /LPF
NEUTROPHILS # BLD AUTO: 1.92 X10*3/UL (ref 1.6–5.5)
NEUTROPHILS NFR BLD AUTO: 38.5 %
NRBC BLD-RTO: 0 /100 WBCS (ref 0–0)
P OFFSET: 171 MS
P ONSET: 121 MS
PLATELET # BLD AUTO: 204 X10*3/UL (ref 150–450)
POTASSIUM SERPL-SCNC: 3.6 MMOL/L (ref 3.5–5.3)
PR INTERVAL: 210 MS
PROT SERPL-MCNC: 5.5 G/DL (ref 6.4–8.2)
Q ONSET: 226 MS
QRS COUNT: 10 BEATS
QRS DURATION: 96 MS
QT INTERVAL: 410 MS
QTC CALCULATION(BAZETT): 412 MS
QTC FREDERICIA: 412 MS
R AXIS: -55 DEGREES
RBC # BLD AUTO: 3.44 X10*6/UL (ref 4–5.2)
RBC #/AREA URNS AUTO: ABNORMAL /HPF
SODIUM SERPL-SCNC: 141 MMOL/L (ref 136–145)
SQUAMOUS #/AREA URNS AUTO: ABNORMAL /HPF
T AXIS: 96 DEGREES
T OFFSET: 431 MS
VENTRICULAR RATE: 61 BPM
WBC # BLD AUTO: 5 X10*3/UL (ref 4.4–11.3)
WBC #/AREA URNS AUTO: ABNORMAL /HPF

## 2024-10-02 PROCEDURE — 85025 COMPLETE CBC W/AUTO DIFF WBC: CPT | Performed by: EMERGENCY MEDICINE

## 2024-10-02 PROCEDURE — 99285 EMERGENCY DEPT VISIT HI MDM: CPT

## 2024-10-02 PROCEDURE — 83735 ASSAY OF MAGNESIUM: CPT | Performed by: EMERGENCY MEDICINE

## 2024-10-02 PROCEDURE — G0378 HOSPITAL OBSERVATION PER HR: HCPCS

## 2024-10-02 PROCEDURE — 81001 URINALYSIS AUTO W/SCOPE: CPT | Performed by: EMERGENCY MEDICINE

## 2024-10-02 PROCEDURE — 80053 COMPREHEN METABOLIC PANEL: CPT | Performed by: EMERGENCY MEDICINE

## 2024-10-02 PROCEDURE — 96366 THER/PROPH/DIAG IV INF ADDON: CPT

## 2024-10-02 PROCEDURE — 74176 CT ABD & PELVIS W/O CONTRAST: CPT | Performed by: RADIOLOGY

## 2024-10-02 PROCEDURE — 74176 CT ABD & PELVIS W/O CONTRAST: CPT

## 2024-10-02 PROCEDURE — 83605 ASSAY OF LACTIC ACID: CPT | Performed by: EMERGENCY MEDICINE

## 2024-10-02 PROCEDURE — 36415 COLL VENOUS BLD VENIPUNCTURE: CPT | Performed by: EMERGENCY MEDICINE

## 2024-10-02 PROCEDURE — 83690 ASSAY OF LIPASE: CPT | Performed by: EMERGENCY MEDICINE

## 2024-10-02 PROCEDURE — 82248 BILIRUBIN DIRECT: CPT | Performed by: EMERGENCY MEDICINE

## 2024-10-02 PROCEDURE — 84075 ASSAY ALKALINE PHOSPHATASE: CPT | Performed by: EMERGENCY MEDICINE

## 2024-10-02 PROCEDURE — 93005 ELECTROCARDIOGRAM TRACING: CPT

## 2024-10-02 PROCEDURE — 84484 ASSAY OF TROPONIN QUANT: CPT | Performed by: EMERGENCY MEDICINE

## 2024-10-02 PROCEDURE — 2500000001 HC RX 250 WO HCPCS SELF ADMINISTERED DRUGS (ALT 637 FOR MEDICARE OP): Performed by: EMERGENCY MEDICINE

## 2024-10-02 PROCEDURE — 96365 THER/PROPH/DIAG IV INF INIT: CPT

## 2024-10-02 PROCEDURE — 2500000004 HC RX 250 GENERAL PHARMACY W/ HCPCS (ALT 636 FOR OP/ED): Performed by: EMERGENCY MEDICINE

## 2024-10-02 PROCEDURE — 96367 TX/PROPH/DG ADDL SEQ IV INF: CPT

## 2024-10-02 RX ORDER — ASPIRIN 325 MG
325 TABLET ORAL ONCE
Status: COMPLETED | OUTPATIENT
Start: 2024-10-02 | End: 2024-10-02

## 2024-10-02 RX ORDER — MAGNESIUM SULFATE HEPTAHYDRATE 40 MG/ML
2 INJECTION, SOLUTION INTRAVENOUS ONCE
Status: COMPLETED | OUTPATIENT
Start: 2024-10-02 | End: 2024-10-02

## 2024-10-02 RX ORDER — ONDANSETRON HYDROCHLORIDE 2 MG/ML
4 INJECTION, SOLUTION INTRAVENOUS ONCE
Status: DISCONTINUED | OUTPATIENT
Start: 2024-10-02 | End: 2024-10-04 | Stop reason: HOSPADM

## 2024-10-02 RX ORDER — CEFTRIAXONE 1 G/50ML
1 INJECTION, SOLUTION INTRAVENOUS ONCE
Status: COMPLETED | OUTPATIENT
Start: 2024-10-02 | End: 2024-10-02

## 2024-10-02 RX ORDER — MORPHINE SULFATE 4 MG/ML
4 INJECTION, SOLUTION INTRAMUSCULAR; INTRAVENOUS ONCE
Status: DISCONTINUED | OUTPATIENT
Start: 2024-10-02 | End: 2024-10-04 | Stop reason: HOSPADM

## 2024-10-02 RX ORDER — CEPHALEXIN 500 MG/1
500 CAPSULE ORAL 4 TIMES DAILY
Qty: 28 CAPSULE | Refills: 0 | Status: SHIPPED | OUTPATIENT
Start: 2024-10-02 | End: 2024-10-09

## 2024-10-02 SDOH — SOCIAL STABILITY: SOCIAL INSECURITY: ARE THERE ANY APPARENT SIGNS OF INJURIES/BEHAVIORS THAT COULD BE RELATED TO ABUSE/NEGLECT?: NO

## 2024-10-02 SDOH — SOCIAL STABILITY: SOCIAL INSECURITY: ARE YOU OR HAVE YOU BEEN THREATENED OR ABUSED PHYSICALLY, EMOTIONALLY, OR SEXUALLY BY ANYONE?: NO

## 2024-10-02 SDOH — SOCIAL STABILITY: SOCIAL INSECURITY: DO YOU FEEL UNSAFE GOING BACK TO THE PLACE WHERE YOU ARE LIVING?: NO

## 2024-10-02 SDOH — SOCIAL STABILITY: SOCIAL INSECURITY: ABUSE: ADULT

## 2024-10-02 SDOH — SOCIAL STABILITY: SOCIAL INSECURITY: DOES ANYONE TRY TO KEEP YOU FROM HAVING/CONTACTING OTHER FRIENDS OR DOING THINGS OUTSIDE YOUR HOME?: NO

## 2024-10-02 SDOH — SOCIAL STABILITY: SOCIAL INSECURITY: HAVE YOU HAD THOUGHTS OF HARMING ANYONE ELSE?: NO

## 2024-10-02 SDOH — SOCIAL STABILITY: SOCIAL INSECURITY: HAS ANYONE EVER THREATENED TO HURT YOUR FAMILY OR YOUR PETS?: NO

## 2024-10-02 SDOH — SOCIAL STABILITY: SOCIAL INSECURITY: HAVE YOU HAD ANY THOUGHTS OF HARMING ANYONE ELSE?: NO

## 2024-10-02 SDOH — SOCIAL STABILITY: SOCIAL INSECURITY: DO YOU FEEL ANYONE HAS EXPLOITED OR TAKEN ADVANTAGE OF YOU FINANCIALLY OR OF YOUR PERSONAL PROPERTY?: NO

## 2024-10-02 ASSESSMENT — COGNITIVE AND FUNCTIONAL STATUS - GENERAL
CLIMB 3 TO 5 STEPS WITH RAILING: TOTAL
MOVING TO AND FROM BED TO CHAIR: A LOT
STANDING UP FROM CHAIR USING ARMS: A LOT
TOILETING: A LITTLE
PERSONAL GROOMING: A LITTLE
DAILY ACTIVITIY SCORE: 17
MOVING FROM LYING ON BACK TO SITTING ON SIDE OF FLAT BED WITH BEDRAILS: A LITTLE
PATIENT BASELINE BEDBOUND: NO
TURNING FROM BACK TO SIDE WHILE IN FLAT BAD: A LITTLE
MOBILITY SCORE: 13
DRESSING REGULAR UPPER BODY CLOTHING: A LITTLE
EATING MEALS: A LITTLE
WALKING IN HOSPITAL ROOM: A LOT
DRESSING REGULAR LOWER BODY CLOTHING: A LITTLE
HELP NEEDED FOR BATHING: A LOT

## 2024-10-02 ASSESSMENT — ACTIVITIES OF DAILY LIVING (ADL)
TOILETING: NEEDS ASSISTANCE
ADEQUATE_TO_COMPLETE_ADL: YES
BATHING: NEEDS ASSISTANCE
HEARING - RIGHT EAR: DIFFICULTY WITH NOISE
HEARING - LEFT EAR: DIFFICULTY WITH NOISE
PATIENT'S MEMORY ADEQUATE TO SAFELY COMPLETE DAILY ACTIVITIES?: NO
JUDGMENT_ADEQUATE_SAFELY_COMPLETE_DAILY_ACTIVITIES: NO
ASSISTIVE_DEVICE: SHOWER CHAIR;WALKER;WHEELCHAIR
FEEDING YOURSELF: NEEDS ASSISTANCE
WALKS IN HOME: NEEDS ASSISTANCE
GROOMING: NEEDS ASSISTANCE

## 2024-10-02 ASSESSMENT — LIFESTYLE VARIABLES
PRESCIPTION_ABUSE_PAST_12_MONTHS: NO
SUBSTANCE_ABUSE_PAST_12_MONTHS: NO
HOW MANY STANDARD DRINKS CONTAINING ALCOHOL DO YOU HAVE ON A TYPICAL DAY: PATIENT DOES NOT DRINK
HOW OFTEN DO YOU HAVE A DRINK CONTAINING ALCOHOL: NEVER
HOW OFTEN DO YOU HAVE 6 OR MORE DRINKS ON ONE OCCASION: NEVER
TOTAL SCORE: 0
SKIP TO QUESTIONS 9-10: 1
AUDIT-C TOTAL SCORE: 0
HAVE YOU EVER FELT YOU SHOULD CUT DOWN ON YOUR DRINKING: NO
EVER FELT BAD OR GUILTY ABOUT YOUR DRINKING: NO
HAVE PEOPLE ANNOYED YOU BY CRITICIZING YOUR DRINKING: NO
EVER HAD A DRINK FIRST THING IN THE MORNING TO STEADY YOUR NERVES TO GET RID OF A HANGOVER: NO
AUDIT-C TOTAL SCORE: 0

## 2024-10-02 ASSESSMENT — PAIN DESCRIPTION - PAIN TYPE: TYPE: ACUTE PAIN

## 2024-10-02 ASSESSMENT — PAIN SCALES - GENERAL
PAINLEVEL_OUTOF10: 5 - MODERATE PAIN
PAINLEVEL_OUTOF10: 8

## 2024-10-02 ASSESSMENT — HEART SCORE
AGE: 65+
TROPONIN: LESS THAN OR EQUAL TO NORMAL LIMIT
HEART SCORE: 4
ECG: NON-SPECIFIC REPOLARIZATION DISTURBANCE
HISTORY: SLIGHTLY SUSPICIOUS
RISK FACTORS: 1-2 RISK FACTORS

## 2024-10-02 ASSESSMENT — COLUMBIA-SUICIDE SEVERITY RATING SCALE - C-SSRS
6. HAVE YOU EVER DONE ANYTHING, STARTED TO DO ANYTHING, OR PREPARED TO DO ANYTHING TO END YOUR LIFE?: NO
2. HAVE YOU ACTUALLY HAD ANY THOUGHTS OF KILLING YOURSELF?: NO
1. IN THE PAST MONTH, HAVE YOU WISHED YOU WERE DEAD OR WISHED YOU COULD GO TO SLEEP AND NOT WAKE UP?: NO
1. IN THE PAST MONTH, HAVE YOU WISHED YOU WERE DEAD OR WISHED YOU COULD GO TO SLEEP AND NOT WAKE UP?: NO
2. HAVE YOU ACTUALLY HAD ANY THOUGHTS OF KILLING YOURSELF?: NO

## 2024-10-02 ASSESSMENT — PAIN - FUNCTIONAL ASSESSMENT: PAIN_FUNCTIONAL_ASSESSMENT: 0-10

## 2024-10-02 ASSESSMENT — PAIN DESCRIPTION - ORIENTATION: ORIENTATION: LOWER

## 2024-10-02 ASSESSMENT — PAIN DESCRIPTION - FREQUENCY: FREQUENCY: CONSTANT/CONTINUOUS

## 2024-10-02 ASSESSMENT — PAIN DESCRIPTION - DESCRIPTORS: DESCRIPTORS: ACHING

## 2024-10-02 ASSESSMENT — PAIN DESCRIPTION - ONSET: ONSET: ONGOING

## 2024-10-02 ASSESSMENT — PAIN DESCRIPTION - LOCATION
LOCATION: ABDOMEN
LOCATION: CHEST

## 2024-10-02 ASSESSMENT — PAIN DESCRIPTION - PROGRESSION: CLINICAL_PROGRESSION: NOT CHANGED

## 2024-10-02 NOTE — DISCHARGE INSTRUCTIONS
-You were admitted with right-sided chest pain, felt to be musculoskeletal-continue using the lidocaine patch as needed.  Heart attack was ruled out.  -Your Celebrex was restarted-recommend you just use in the mornings.  If after 1-2 weeks there has been no improvement in your hand arthritis, stop it.  -When you get skin tears, use Vaseline on them and cover them with a nonstick dressing so that they can heal better.  You do not need to use antibiotic ointments which can actually cause skin reactions.  -Apply Calmoseptine to your perineal & labial area 3 times a day to help with the burning.  -Your low magnesium was replaced, it is still normal today.  Continue taking your normal dose of magnesium after discharge.  -The urine culture you did the day prior to admission was negative, you do not have a UTI/urinary tract infection.  You did receive several doses of antibiotics until result was found.  You do not need any further antibiotics.   -Do not take the darifenacin (Enablex) which is on your home medication list if you are using the Sanctura (which I reordered for you)-they are both similar medications.  Use 1 or the other, depending on which helps the most.

## 2024-10-02 NOTE — ED PROVIDER NOTES
HPI   Chief Complaint   Patient presents with    Abdominal Pain     Lower abdominal pain that radiates to her vagina. Radiation treatment in August on her abdomen.       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  She states they did a dip at her doctor's office but did not see anything but they sent it for culture but told her it would take several days.  She denies any fevers or chills.  No nausea vomiting.  Mild diarrhea.  No blood in her urine blood in her stools or dark tarry stools.  She has not been on recent antibiotics.  No history of C. difficile.  She has had previous hernia repair with mesh several years ago.  Family HX: Denies any significant/pertinent family history.  Social Hx: Denies ETOH or drug use.  Review of systems:  Gen.: No weight loss, fatigue, anorexia, insomnia, fever.   Eyes: No vision loss, double vision, drainage, eye pain.   ENT: No pharyngitis, dry mouth.   Cardiac: No chest pain, palpitations, syncope, near syncope.   Pulmonary: No shortness of breath, cough, hemoptysis.   Heme/lymph: No swollen glands, fever, bleeding.   GI: No  change in bowel habits, melena, hematemesis, hematochezia, nausea, vomiting, diarrhea.   : No discharge,  hematuria.   Musculoskeletal: No limb pain, joint pain, joint swelling.   Skin: No rashes.   Psych: No depression, anxiety, suicidality, homicidality.   Review of systems is otherwise negative unless stated above or in history of present illness.    Physical Exam:    Appearance: Alert, oriented , cooperative,  in no acute distress. Well nourished & well hydrated.    Skin: Intact,  dry skin, no lesions, rash, petechiae or purpura.     Eyes: PERRLA, EOMs intact,  Conjunctiva pink with no redness or exudates. Eyelids without lesions. No scleral icterus.     ENT: Hearing grossly intact. External  auditory canals patent, tympanic membranes intact with visible landmarks. Nares patent, mucus membranes moist. Dentition without lesions. Pharynx clear, uvula midline.     Neck: Supple, without meningismus. Thyroid not palpable. Trachea at midline. No lymphadenopathy.    Pulmonary: Clear bilaterally with good chest wall excursion. No rales, rhonchi or wheezing. No accessory muscle use or stridor.    Cardiac: Normal S1, S2 without murmur, rub, gallop or extrasystole. No JVD, Carotids without bruits.    Abdomen: Soft, active bowel sounds.  No palpable organomegaly.  No rebound or guarding.  No CVA tenderness.  Mild suprapubic tenderness no rebound or guarding.  No CVA tenderness    Genitourinary: Exam deferred.    Musculoskeletal: Full range of motion. no pain, edema, or deformity. Pulses full and equal. No cyanosis, clubbing, or edema.    Neurological:  Cranial nerves II through XII are grossly intact, finger-nose touch is normal, normal sensation, no weakness, no focal findings identified.    Psychiatric: Appropriate mood and affect.     Medical Decision-Making:  Testing: EKG was obtained which is interpreted by me shows a sinus rhythm rate of 61 nonspecific ST-T wave changes no evidence of obvious ST elevations.  Some of these T wave changes are new compared to a previous EKG from March.  Labs show normal troponin.  A magnesium that was low at 1.37.  This was repleted IV.  Urinalysis showed 21-50 WBCs.  It was orange but patient states she was taking a medicine which was likely Azo.  Patient is not having any chest pain.  Discussed admission for the apparently new changes to the EKG patient stated that she would really prefer to go home.  She will be given medication for the UTI.  She was given a dose of IV Rocephin here.  She be given a prescription for Keflex.  CT abdomen pelvis was obtained which shows no hydronephrosis hydroureter no evidence of bowel obstruction free intraperitoneal air or abdominal  intra-abdominal fluid collection.  Given that these findings have been going on for about a week if this was related to the abnormal EKG I would lana expect to see some changes in the troponin.  Treatment:   Reevaluation:   Plan: Homegoing. Discussed differential. Will follow-up with the primary physician in the next 2-3 days. Return if worse. They understand return precautions and discharge instructions. Patient and family/friend/caregiver are in agreement with this plan.   Impression:   1. Uti    2.abnormal ecg    Labs Reviewed  HEPATIC FUNCTION PANEL - Abnormal     Albumin                       3.5                    Bilirubin, Total              0.5                    Bilirubin, Direct             0.1                    Alkaline Phosphatase          78                     ALT                           6 (*)                  AST                           11                     Total Protein                 5.5 (*)             MAGNESIUM - Abnormal     Magnesium                     1.37 (*)            BASIC METABOLIC PANEL - Abnormal     Glucose                       88                     Sodium                        141                    Potassium                     3.6                    Chloride                      112 (*)                Bicarbonate                   22                     Anion Gap                     11                     Urea Nitrogen                 12                     Creatinine                    0.83                   eGFR                          68                     Calcium                       8.1 (*)             CBC WITH AUTO DIFFERENTIAL - Abnormal     WBC                           5.0                    nRBC                          0.0                    RBC                           3.44 (*)               Hemoglobin                    11.8 (*)               Hematocrit                    37.8                   MCV                           110 (*)                MCH                            34.3 (*)               MCHC                          31.2 (*)               RDW                           14.0                   Platelets                     204                    Neutrophils %                 38.5                   Immature Granulocytes %, Automated   0.6                    Lymphocytes %                 43.6                   Monocytes %                   13.9                   Eosinophils %                 1.6                    Basophils %                   1.8                    Neutrophils Absolute          1.92                   Immature Granulocytes Absolute, Au*   0.03                   Lymphocytes Absolute          2.17                   Monocytes Absolute            0.69                   Eosinophils Absolute          0.08                   Basophils Absolute            0.09                URINALYSIS WITH REFLEX CULTURE AND MICROSCOPIC - Abnormal     Color, Urine                  Orange (*)               Appearance, Urine             Clear               MICROSCOPIC ONLY, URINE - Abnormal     WBC, Urine                    21-50 (*)               RBC, Urine                    6-10 (*)               Squamous Epithelial Cells, Urine                          Bacteria, Urine               2+ (*)                 Mucus, Urine                  FEW                    Hyaline Casts, Urine          OCCASIONAL (*)               Amorphous Crystals, Urine     1+                  LIPASE - Normal     Lipase                        16                       Narrative: Venipuncture immediately after or during the administration of Metamizole may lead to falsely low results. Testing should be performed immediately prior to Metamizole dosing.  LACTATE - Normal     Lactate                       0.6                      Narrative: Venipuncture immediately after or during the administration of Metamizole may lead to falsely low results. Testing should be performed immediately prior to  Metamizole dosing.  TROPONIN I, HIGH SENSITIVITY - Normal     Troponin I, High Sensitivity   7                        Narrative: Less than 99th percentile of normal range cutoff-                Female and children under 18 years old <14 ng/L; Male <21 ng/L: Negative                Repeat testing should be performed if clinically indicated.                                 Female and children under 18 years old 14-50 ng/L; Male 21-50 ng/L:                Consistent with possible cardiac damage and possible increased clinical                 risk. Serial measurements may help to assess extent of myocardial damage.                                 >50 ng/L: Consistent with cardiac damage, increased clinical risk and                myocardial infarction. Serial measurements may help assess extent of                 myocardial damage.                                  NOTE: Children less than 1 year old may have higher baseline troponin                 levels and results should be interpreted in conjunction with the overall                 clinical context.                                 NOTE: Troponin I testing is performed using a different                 testing methodology at [facility] than at other                 [facility]. Direct result comparisons should only                 be made within the same method.  URINALYSIS WITH REFLEX CULTURE AND MICROSCOPIC       Narrative: The following orders were created for panel order Urinalysis with Reflex Culture and Microscopic.                Procedure                               Abnormality         Status                                   ---------                               -----------         ------                                   Urinalysis with Reflex C...[[id number]]  Abnormal            Final result                             Extra Urine Gray Tube[[id number]]                            In process                                                Please view results for these tests on the individual orders.  EXTRA URINE GRAY TUBE     CT abdomen pelvis wo IV contrast   Final Result    1. No hydronephrosis, hydroureter or renal/ureteral calculus.    2. No evidence of bowel obstruction, free intraperitoneal air or    abnormal intra-abdominal fluid collection.          MACRO:    None          Signed by: Everett Schwartz 10/2/2024 2:49 PM    Dictation workstation:   [id number]                      Patient History   Past Medical History:   Diagnosis Date    Encounter for preprocedural cardiovascular examination 01/31/2017    Pre-operative cardiovascular examination    HL (hearing loss)     Patient states hard of hearing    Lymphedema, not elsewhere classified 12/16/2022    Lymphedema    Obesity, unspecified 11/10/2022    Class 1 obesity with body mass index (BMI) of 33.0 to 33.9 in adult    Obesity, unspecified 10/27/2022    Class 1 obesity with body mass index (BMI) of 34.0 to 34.9 in adult    Other bursitis of hip, left hip 10/12/2021    Hip bursitis, left    Other conditions influencing health status 01/31/2017    Cardiovascular Stress Test    Other seasonal allergic rhinitis 11/10/2022    Seasonal allergies    Other specified noninflammatory disorders of vagina 06/23/2022    Vaginal lesion    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    History of malignant lymphoma    Personal history of non-Hodgkin lymphomas     History of lymphoma    Personal history of other diseases of the circulatory system 01/25/2017    History of abnormal electrocardiography    Personal history of other diseases of the circulatory system     History of coronary artery disease    Personal history of other diseases of the digestive system 04/20/2022    History of rectal bleeding    Personal history of other diseases of the digestive system 06/06/2022    History of rectal bleeding    Personal history of other diseases of the digestive system 06/06/2022    History of constipation     Personal history of other diseases of the digestive system 04/19/2022    History of anal fissures    Personal history of other diseases of the respiratory system 10/27/2022    History of upper respiratory infection    Personal history of other diseases of urinary system 12/16/2022    History of renal insufficiency syndrome    Personal history of other endocrine, nutritional and metabolic disease 10/12/2021    History of obesity    Personal history of other infectious and parasitic diseases 06/23/2022    History of herpes simplex infection    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    History of other malignant neoplasm of large intestine    Personal history of other specified conditions 06/23/2022    History of urinary frequency    Personal history of other specified conditions 06/23/2022    History of gross hematuria    Personal history of other specified conditions 01/13/2022    History of dysuria    Personal history of other specified conditions 01/04/2022    History of dysuria    Persons encountering health services in other specified circumstances 10/12/2021    Encounter to establish care with new doctor    Subacute and chronic vaginitis 01/13/2022    Chronic vaginitis    Subacute and chronic vaginitis 01/04/2022    Subacute and chronic vaginitis    Trochanteric bursitis, left hip 06/06/2022    Greater trochanteric bursitis of left hip    Unspecified disorder of synovium and tendon, left thigh 06/06/2022    Tendinopathy of left gluteus medius    Unspecified disorder of synovium and tendon, left thigh 10/12/2021    Tendinopathy of left gluteus medius    Unspecified symptoms and signs involving the genitourinary system 01/04/2022    UTI symptoms    Unspecified symptoms and signs involving the genitourinary system     UTI symptoms    Urge incontinence 06/23/2022    Urge incontinence     Past Surgical History:   Procedure Laterality Date    HERNIA REPAIR  10/2023    OTHER  SURGICAL HISTORY  06/06/2022    Colonoscopy    OTHER SURGICAL HISTORY  10/12/2021    Tonsillectomy    OTHER SURGICAL HISTORY  10/12/2021    Cholecystectomy    OTHER SURGICAL HISTORY  10/12/2021    Sinus surgery    OTHER SURGICAL HISTORY  10/12/2021    Appendectomy    OTHER SURGICAL HISTORY  10/12/2021    Hernia repair    OTHER SURGICAL HISTORY  10/12/2021    Bladder surgery    OTHER SURGICAL HISTORY  10/12/2021    Knee replacement    OTHER SURGICAL HISTORY  10/12/2021    Cataract surgery    OTHER SURGICAL HISTORY  10/12/2021    Dilation and curettage    OTHER SURGICAL HISTORY  10/12/2021    Revision knee arthroplasty    OTHER SURGICAL HISTORY  10/12/2021    Neck surgery    OTHER SURGICAL HISTORY  10/12/2021    Carpal tunnel surgery     Family History   Problem Relation Name Age of Onset    Hypertension Mother      Glaucoma Mother       Social History     Tobacco Use    Smoking status: Former     Types: Cigarettes    Smokeless tobacco: Never   Vaping Use    Vaping status: Never Used   Substance Use Topics    Alcohol use: Not Currently    Drug use: Never       Physical Exam   ED Triage Vitals [10/02/24 1249]   Temperature Heart Rate Respirations BP   36.3 °C (97.3 °F) 65 20 (!) 146/102      Pulse Ox Temp Source Heart Rate Source Patient Position   98 % Temporal Monitor Sitting      BP Location FiO2 (%)     Right arm --       Physical Exam      ED Course & MDM   Diagnoses as of 10/02/24 1708   UTI (urinary tract infection) with pyuria                 No data recorded     [place] Coma Scale Score: 15 (10/02/24 1251 : Mp Mitchell RN)                           Medical Decision Making      Procedure  Procedures     Uzma Phillips MD  10/02/24 1716

## 2024-10-02 NOTE — TELEPHONE ENCOUNTER
Patients daughter called today stating that she dropped patients urine off at the lab yesterday on the 3rd floor. Daughter called wanting to know if we received the results of the urine and if any medication was going to be prescribed to patient. Called daughter and explained that that culture is going to take a couple of days to get the results back, and that she will get a call once those results come in. As for the UA I relayed to daughter that those results came back normal. Daughter proceed to say that patient is having pain in their abdomen and has been experiencing burning and wants to know what to do, states that tylenol isn't helping. I advised to daughter that I can transfer her to the  to schedule an appointment for patient. Daughter declined and stated that she is going to take patient to the hospital.

## 2024-10-03 PROBLEM — N39.0 UTI (URINARY TRACT INFECTION) WITH PYURIA: Status: ACTIVE | Noted: 2024-10-03

## 2024-10-03 LAB
ANION GAP SERPL CALC-SCNC: 11 MMOL/L (ref 10–20)
ATRIAL RATE: 62 BPM
BUN SERPL-MCNC: 12 MG/DL (ref 6–23)
CALCIUM SERPL-MCNC: 8.5 MG/DL (ref 8.6–10.3)
CARDIAC TROPONIN I PNL SERPL HS: 10 NG/L (ref 0–13)
CHLORIDE SERPL-SCNC: 109 MMOL/L (ref 98–107)
CO2 SERPL-SCNC: 25 MMOL/L (ref 21–32)
CREAT SERPL-MCNC: 0.83 MG/DL (ref 0.5–1.05)
EGFRCR SERPLBLD CKD-EPI 2021: 68 ML/MIN/1.73M*2
ERYTHROCYTE [DISTWIDTH] IN BLOOD BY AUTOMATED COUNT: 13.4 % (ref 11.5–14.5)
GLUCOSE SERPL-MCNC: 71 MG/DL (ref 74–99)
HCT VFR BLD AUTO: 32.1 % (ref 36–46)
HGB BLD-MCNC: 10.2 G/DL (ref 12–16)
HOLD SPECIMEN: NORMAL
MAGNESIUM SERPL-MCNC: 1.71 MG/DL (ref 1.6–2.4)
MCH RBC QN AUTO: 34.5 PG (ref 26–34)
MCHC RBC AUTO-ENTMCNC: 31.8 G/DL (ref 32–36)
MCV RBC AUTO: 108 FL (ref 80–100)
NRBC BLD-RTO: 0 /100 WBCS (ref 0–0)
P AXIS: 90 DEGREES
P OFFSET: 153 MS
P ONSET: 104 MS
PLATELET # BLD AUTO: 161 X10*3/UL (ref 150–450)
POTASSIUM SERPL-SCNC: 3.5 MMOL/L (ref 3.5–5.3)
PR INTERVAL: 244 MS
Q ONSET: 226 MS
QRS COUNT: 11 BEATS
QRS DURATION: 80 MS
QT INTERVAL: 426 MS
QTC CALCULATION(BAZETT): 432 MS
QTC FREDERICIA: 430 MS
R AXIS: 56 DEGREES
RBC # BLD AUTO: 2.96 X10*6/UL (ref 4–5.2)
SODIUM SERPL-SCNC: 141 MMOL/L (ref 136–145)
T AXIS: 37 DEGREES
T OFFSET: 439 MS
VENTRICULAR RATE: 62 BPM
WBC # BLD AUTO: 4.9 X10*3/UL (ref 4.4–11.3)

## 2024-10-03 PROCEDURE — 2500000002 HC RX 250 W HCPCS SELF ADMINISTERED DRUGS (ALT 637 FOR MEDICARE OP, ALT 636 FOR OP/ED): Performed by: INTERNAL MEDICINE

## 2024-10-03 PROCEDURE — 80048 BASIC METABOLIC PNL TOTAL CA: CPT | Performed by: INTERNAL MEDICINE

## 2024-10-03 PROCEDURE — 84484 ASSAY OF TROPONIN QUANT: CPT | Performed by: INTERNAL MEDICINE

## 2024-10-03 PROCEDURE — 99223 1ST HOSP IP/OBS HIGH 75: CPT | Performed by: INTERNAL MEDICINE

## 2024-10-03 PROCEDURE — 2500000001 HC RX 250 WO HCPCS SELF ADMINISTERED DRUGS (ALT 637 FOR MEDICARE OP): Performed by: INTERNAL MEDICINE

## 2024-10-03 PROCEDURE — 2500000004 HC RX 250 GENERAL PHARMACY W/ HCPCS (ALT 636 FOR OP/ED): Performed by: INTERNAL MEDICINE

## 2024-10-03 PROCEDURE — 83735 ASSAY OF MAGNESIUM: CPT | Performed by: INTERNAL MEDICINE

## 2024-10-03 PROCEDURE — 85027 COMPLETE CBC AUTOMATED: CPT | Performed by: INTERNAL MEDICINE

## 2024-10-03 PROCEDURE — 1200000002 HC GENERAL ROOM WITH TELEMETRY DAILY

## 2024-10-03 PROCEDURE — 2500000005 HC RX 250 GENERAL PHARMACY W/O HCPCS: Performed by: INTERNAL MEDICINE

## 2024-10-03 RX ORDER — CEFTRIAXONE 1 G/50ML
1 INJECTION, SOLUTION INTRAVENOUS ONCE
Status: COMPLETED | OUTPATIENT
Start: 2024-10-03 | End: 2024-10-03

## 2024-10-03 RX ORDER — CLONIDINE 0.3 MG/24H
1 PATCH, EXTENDED RELEASE TRANSDERMAL
Status: DISCONTINUED | OUTPATIENT
Start: 2024-10-06 | End: 2024-10-04 | Stop reason: HOSPADM

## 2024-10-03 RX ORDER — CELECOXIB 50 MG/1
50 CAPSULE ORAL 2 TIMES DAILY
Status: DISCONTINUED | OUTPATIENT
Start: 2024-10-03 | End: 2024-10-03

## 2024-10-03 RX ORDER — ALUMINUM HYDROXIDE, MAGNESIUM HYDROXIDE, AND SIMETHICONE 1200; 120; 1200 MG/30ML; MG/30ML; MG/30ML
30 SUSPENSION ORAL 4 TIMES DAILY PRN
Status: DISCONTINUED | OUTPATIENT
Start: 2024-10-03 | End: 2024-10-04 | Stop reason: HOSPADM

## 2024-10-03 RX ORDER — AMLODIPINE AND OLMESARTAN MEDOXOMIL 10; 40 MG/1; MG/1
1 TABLET ORAL
Status: DISCONTINUED | OUTPATIENT
Start: 2024-10-03 | End: 2024-10-04 | Stop reason: HOSPADM

## 2024-10-03 RX ORDER — MENTHOL AND ZINC OXIDE .44; 20.625 G/100G; G/100G
1 OINTMENT TOPICAL 4 TIMES DAILY PRN
Status: DISCONTINUED | OUTPATIENT
Start: 2024-10-03 | End: 2024-10-04 | Stop reason: HOSPADM

## 2024-10-03 RX ORDER — CELECOXIB 100 MG/1
100 CAPSULE ORAL 2 TIMES DAILY
Status: DISCONTINUED | OUTPATIENT
Start: 2024-10-03 | End: 2024-10-04 | Stop reason: HOSPADM

## 2024-10-03 RX ORDER — MAGNESIUM HYDROXIDE 2400 MG/10ML
10 SUSPENSION ORAL DAILY PRN
Status: DISCONTINUED | OUTPATIENT
Start: 2024-10-03 | End: 2024-10-04 | Stop reason: HOSPADM

## 2024-10-03 RX ORDER — LEVOTHYROXINE SODIUM 50 UG/1
50 TABLET ORAL DAILY
Status: DISCONTINUED | OUTPATIENT
Start: 2024-10-03 | End: 2024-10-04 | Stop reason: HOSPADM

## 2024-10-03 RX ORDER — TORSEMIDE 20 MG/1
10 TABLET ORAL
Status: DISCONTINUED | OUTPATIENT
Start: 2024-10-04 | End: 2024-10-04 | Stop reason: HOSPADM

## 2024-10-03 RX ORDER — ASPIRIN 81 MG/1
81 TABLET ORAL DAILY
Status: DISCONTINUED | OUTPATIENT
Start: 2024-10-03 | End: 2024-10-04 | Stop reason: HOSPADM

## 2024-10-03 RX ORDER — NITROGLYCERIN 0.4 MG/1
0.4 TABLET SUBLINGUAL EVERY 5 MIN PRN
Status: DISCONTINUED | OUTPATIENT
Start: 2024-10-03 | End: 2024-10-04 | Stop reason: HOSPADM

## 2024-10-03 RX ORDER — SPIRONOLACTONE 25 MG/1
12.5 TABLET ORAL DAILY
Status: DISCONTINUED | OUTPATIENT
Start: 2024-10-03 | End: 2024-10-04 | Stop reason: HOSPADM

## 2024-10-03 RX ORDER — PHENAZOPYRIDINE HYDROCHLORIDE 100 MG/1
95 TABLET, FILM COATED ORAL
Status: DISCONTINUED | OUTPATIENT
Start: 2024-10-03 | End: 2024-10-04 | Stop reason: HOSPADM

## 2024-10-03 RX ORDER — LATANOPROST 50 UG/ML
1 SOLUTION/ DROPS OPHTHALMIC NIGHTLY
Status: DISCONTINUED | OUTPATIENT
Start: 2024-10-03 | End: 2024-10-04 | Stop reason: HOSPADM

## 2024-10-03 RX ORDER — L. ACIDOPHILUS/L.BULGARICUS 1MM CELL
1 TABLET ORAL DAILY
Status: DISCONTINUED | OUTPATIENT
Start: 2024-10-03 | End: 2024-10-04 | Stop reason: HOSPADM

## 2024-10-03 RX ORDER — LIDOCAINE 560 MG/1
1 PATCH PERCUTANEOUS; TOPICAL; TRANSDERMAL DAILY
Status: DISCONTINUED | OUTPATIENT
Start: 2024-10-03 | End: 2024-10-04 | Stop reason: HOSPADM

## 2024-10-03 RX ORDER — ACETAMINOPHEN 500 MG
5 TABLET ORAL NIGHTLY PRN
Status: DISCONTINUED | OUTPATIENT
Start: 2024-10-03 | End: 2024-10-04 | Stop reason: HOSPADM

## 2024-10-03 RX ORDER — ACETAMINOPHEN 325 MG/1
650 TABLET ORAL EVERY 4 HOURS PRN
Status: DISCONTINUED | OUTPATIENT
Start: 2024-10-03 | End: 2024-10-04 | Stop reason: HOSPADM

## 2024-10-03 RX ORDER — METOPROLOL SUCCINATE 25 MG/1
12.5 TABLET, EXTENDED RELEASE ORAL DAILY
Status: DISCONTINUED | OUTPATIENT
Start: 2024-10-03 | End: 2024-10-04 | Stop reason: HOSPADM

## 2024-10-03 RX ORDER — ONDANSETRON 4 MG/1
4 TABLET, FILM COATED ORAL EVERY 8 HOURS PRN
Status: DISCONTINUED | OUTPATIENT
Start: 2024-10-03 | End: 2024-10-04 | Stop reason: HOSPADM

## 2024-10-03 RX ORDER — ICOSAPENT ETHYL 1 G/1
2 CAPSULE ORAL
Status: DISCONTINUED | OUTPATIENT
Start: 2024-10-03 | End: 2024-10-04 | Stop reason: HOSPADM

## 2024-10-03 RX ORDER — OXYBUTYNIN CHLORIDE 5 MG/1
5 TABLET ORAL 3 TIMES DAILY
Status: DISCONTINUED | OUTPATIENT
Start: 2024-10-03 | End: 2024-10-04 | Stop reason: HOSPADM

## 2024-10-03 RX ORDER — TIMOLOL MALEATE 5 MG/ML
1 SOLUTION/ DROPS OPHTHALMIC EVERY 12 HOURS
Status: DISCONTINUED | OUTPATIENT
Start: 2024-10-03 | End: 2024-10-04 | Stop reason: HOSPADM

## 2024-10-03 RX ORDER — ONDANSETRON HYDROCHLORIDE 2 MG/ML
4 INJECTION, SOLUTION INTRAVENOUS EVERY 4 HOURS PRN
Status: DISCONTINUED | OUTPATIENT
Start: 2024-10-03 | End: 2024-10-04 | Stop reason: HOSPADM

## 2024-10-03 SDOH — SOCIAL STABILITY: SOCIAL INSECURITY: WITHIN THE LAST YEAR, HAVE YOU BEEN HUMILIATED OR EMOTIONALLY ABUSED IN OTHER WAYS BY YOUR PARTNER OR EX-PARTNER?: NO

## 2024-10-03 SDOH — SOCIAL STABILITY: SOCIAL NETWORK: IN A TYPICAL WEEK, HOW MANY TIMES DO YOU TALK ON THE PHONE WITH FAMILY, FRIENDS, OR NEIGHBORS?: TWICE A WEEK

## 2024-10-03 SDOH — ECONOMIC STABILITY: INCOME INSECURITY: HOW HARD IS IT FOR YOU TO PAY FOR THE VERY BASICS LIKE FOOD, HOUSING, MEDICAL CARE, AND HEATING?: NOT HARD AT ALL

## 2024-10-03 SDOH — ECONOMIC STABILITY: TRANSPORTATION INSECURITY
IN THE PAST 12 MONTHS, HAS LACK OF TRANSPORTATION KEPT YOU FROM MEETINGS, WORK, OR FROM GETTING THINGS NEEDED FOR DAILY LIVING?: NO

## 2024-10-03 SDOH — ECONOMIC STABILITY: INCOME INSECURITY: IN THE LAST 12 MONTHS, WAS THERE A TIME WHEN YOU WERE NOT ABLE TO PAY THE MORTGAGE OR RENT ON TIME?: NO

## 2024-10-03 SDOH — ECONOMIC STABILITY: HOUSING INSECURITY: AT ANY TIME IN THE PAST 12 MONTHS, WERE YOU HOMELESS OR LIVING IN A SHELTER (INCLUDING NOW)?: NO

## 2024-10-03 SDOH — ECONOMIC STABILITY: INCOME INSECURITY: IN THE PAST 12 MONTHS, HAS THE ELECTRIC, GAS, OIL, OR WATER COMPANY THREATENED TO SHUT OFF SERVICE IN YOUR HOME?: NO

## 2024-10-03 SDOH — SOCIAL STABILITY: SOCIAL INSECURITY
WITHIN THE LAST YEAR, HAVE YOU BEEN KICKED, HIT, SLAPPED, OR OTHERWISE PHYSICALLY HURT BY YOUR PARTNER OR EX-PARTNER?: NO

## 2024-10-03 SDOH — ECONOMIC STABILITY: HOUSING INSECURITY: IN THE PAST 12 MONTHS, HOW MANY TIMES HAVE YOU MOVED WHERE YOU WERE LIVING?: 1

## 2024-10-03 SDOH — SOCIAL STABILITY: SOCIAL INSECURITY: WITHIN THE LAST YEAR, HAVE YOU BEEN AFRAID OF YOUR PARTNER OR EX-PARTNER?: NO

## 2024-10-03 SDOH — HEALTH STABILITY: MENTAL HEALTH
STRESS IS WHEN SOMEONE FEELS TENSE, NERVOUS, ANXIOUS, OR CAN'T SLEEP AT NIGHT BECAUSE THEIR MIND IS TROUBLED. HOW STRESSED ARE YOU?: NOT AT ALL

## 2024-10-03 SDOH — ECONOMIC STABILITY: FOOD INSECURITY: WITHIN THE PAST 12 MONTHS, THE FOOD YOU BOUGHT JUST DIDN'T LAST AND YOU DIDN'T HAVE MONEY TO GET MORE.: NEVER TRUE

## 2024-10-03 SDOH — SOCIAL STABILITY: SOCIAL INSECURITY
WITHIN THE LAST YEAR, HAVE TO BEEN RAPED OR FORCED TO HAVE ANY KIND OF SEXUAL ACTIVITY BY YOUR PARTNER OR EX-PARTNER?: NO

## 2024-10-03 SDOH — SOCIAL STABILITY: SOCIAL NETWORK: HOW OFTEN DO YOU ATTENT MEETINGS OF THE CLUB OR ORGANIZATION YOU BELONG TO?: NEVER

## 2024-10-03 SDOH — ECONOMIC STABILITY: FOOD INSECURITY: WITHIN THE PAST 12 MONTHS, YOU WORRIED THAT YOUR FOOD WOULD RUN OUT BEFORE YOU GOT MONEY TO BUY MORE.: NEVER TRUE

## 2024-10-03 SDOH — HEALTH STABILITY: PHYSICAL HEALTH: ON AVERAGE, HOW MANY MINUTES DO YOU ENGAGE IN EXERCISE AT THIS LEVEL?: 0 MIN

## 2024-10-03 SDOH — SOCIAL STABILITY: SOCIAL NETWORK
DO YOU BELONG TO ANY CLUBS OR ORGANIZATIONS SUCH AS CHURCH GROUPS UNIONS, FRATERNAL OR ATHLETIC GROUPS, OR SCHOOL GROUPS?: NO

## 2024-10-03 SDOH — SOCIAL STABILITY: SOCIAL NETWORK: ARE YOU MARRIED, WIDOWED, DIVORCED, SEPARATED, NEVER MARRIED, OR LIVING WITH A PARTNER?: PATIENT DECLINED

## 2024-10-03 SDOH — SOCIAL STABILITY: SOCIAL NETWORK: HOW OFTEN DO YOU GET TOGETHER WITH FRIENDS OR RELATIVES?: NEVER

## 2024-10-03 SDOH — HEALTH STABILITY: PHYSICAL HEALTH: ON AVERAGE, HOW MANY DAYS PER WEEK DO YOU ENGAGE IN MODERATE TO STRENUOUS EXERCISE (LIKE A BRISK WALK)?: 0 DAYS

## 2024-10-03 SDOH — SOCIAL STABILITY: SOCIAL NETWORK: HOW OFTEN DO YOU ATTEND CHURCH OR RELIGIOUS SERVICES?: NEVER

## 2024-10-03 SDOH — ECONOMIC STABILITY: TRANSPORTATION INSECURITY
IN THE PAST 12 MONTHS, HAS THE LACK OF TRANSPORTATION KEPT YOU FROM MEDICAL APPOINTMENTS OR FROM GETTING MEDICATIONS?: NO

## 2024-10-03 ASSESSMENT — ACTIVITIES OF DAILY LIVING (ADL)
DRESSING YOURSELF: NEEDS ASSISTANCE
HEARING - LEFT EAR: DIFFICULTY WITH NOISE
LACK_OF_TRANSPORTATION: NO
GROOMING: NEEDS ASSISTANCE
HEARING - RIGHT EAR: DIFFICULTY WITH NOISE
ADEQUATE_TO_COMPLETE_ADL: YES
TOILETING: NEEDS ASSISTANCE
PATIENT'S MEMORY ADEQUATE TO SAFELY COMPLETE DAILY ACTIVITIES?: NO
WALKS IN HOME: NEEDS ASSISTANCE
BATHING: NEEDS ASSISTANCE
FEEDING YOURSELF: NEEDS ASSISTANCE
JUDGMENT_ADEQUATE_SAFELY_COMPLETE_DAILY_ACTIVITIES: NO

## 2024-10-03 ASSESSMENT — COGNITIVE AND FUNCTIONAL STATUS - GENERAL
STANDING UP FROM CHAIR USING ARMS: A LOT
DAILY ACTIVITIY SCORE: 17
PERSONAL GROOMING: A LITTLE
MOVING TO AND FROM BED TO CHAIR: A LOT
TURNING FROM BACK TO SIDE WHILE IN FLAT BAD: A LITTLE
MOVING FROM LYING ON BACK TO SITTING ON SIDE OF FLAT BED WITH BEDRAILS: A LITTLE
HELP NEEDED FOR BATHING: A LOT
DRESSING REGULAR LOWER BODY CLOTHING: A LITTLE
DRESSING REGULAR UPPER BODY CLOTHING: A LITTLE
EATING MEALS: A LITTLE
TOILETING: A LITTLE
WALKING IN HOSPITAL ROOM: A LOT
CLIMB 3 TO 5 STEPS WITH RAILING: TOTAL
MOBILITY SCORE: 13

## 2024-10-03 ASSESSMENT — PAIN SCALES - GENERAL
PAINLEVEL_OUTOF10: 3
PAINLEVEL_OUTOF10: 4

## 2024-10-03 ASSESSMENT — PAIN DESCRIPTION - ORIENTATION: ORIENTATION: LEFT

## 2024-10-03 ASSESSMENT — PAIN DESCRIPTION - LOCATION: LOCATION: ABDOMEN

## 2024-10-03 NOTE — H&P
10/03/24       CHIEF COMPLAINT:      Chief Complaint   Patient presents with    Abdominal Pain     Lower abdominal pain that radiates to her vagina. Radiation treatment in August on her abdomen.        PCP is Audie King MD, oncologist is Dr. Khan, cardiologist is Dr. Hsu    History is taken from the patient is a fair historian, her daughter who is a good historian, discussion with the ER physician & ER records, [facility] outpatient medical records, [facility] medical records and records from [facility].  She is also known to me from several prior admissions.    Laura Woodward is a 89 y.o. 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    She presented to the ER last night complaining of lower abdominal pain.  According to the patient and her daughter, she has had some mid abdominal bilateral discomfort which started several weeks ago.  No nausea or vomiting, 1 episode of diarrhea but otherwise bowel movements have been okay.  She also has been having intermittent dysuria, burning with urination since the end of August.  It became worse over the weekend-daughter has been giving her Azo, cranberry juice, and Tylenol without improvement.  Patient does have to wear pads at night due to urinary incontinence.  They have tried changing to other pads without improvement.  It became worse over the weekend, she took a urine specimen to the doctor's office on Tuesday, but yesterday it was severe so she came to the ER.  She also is complaining of mid sternal chest pain-worse when she was lying down.  She has also had this off and on for several weeks.  It is not exertional, it is heavy in nature.  Her daughter has tried listening to her  heart to see if she was back in atrial fibrillation but did not hear any.  When she gets the episodes they will last about 10 minutes.  They are not pleuritic, no radiation.    In the ER she was initially hypertensive at 146/102, blood pressures improved without treatment.  Chemistry panel with a blood sugar of 88, unremarkable electrolytes.  BUN 12 with creatinine 0.83.  LFTs normal.  Magnesium was low at 137.  Initial troponin was 7, repeat 8 and 10.  CBC with a WBC of 5.0, H/H was 11.8/37.8.  Platelet count 204 K.   Urinalysis with 21-50 WBCs..CT of the abdomen and pelvis with moderate to severe atherosclerotic calcifications throughout the infrarenal abdominal aorta and iliac arteries, otherwise no acute findings.  EKG was sinus rhythm with nonspecific ST-T wave abnormalities which were new from her prior tracing of 3/2024.  Repeat EKG several hours later showed resolution of her lateral T wave inversions but she still had some ST-T wave abnormalities.  Patient initially denied chest pains, was given a dose of ceftriaxone and magnesium was replaced, she was going to be discharged home but then she apparently stated she was having chest pain so she was admitted to rule out MI.    Since admission she has had negative troponins.  She is still complaining of significant burning when she urinates-on questioning it sounds like it is more external, rather than internal.    Chart review:   On 6/19/2024 the patient had a PET CT scan which revealed new intensely hypermetabolic mesenteric soft tissue mass in the right lower abdominal quadrant consistent with lipomatous recurrence. There is no hypermetabolic disease elsewhere.  She underwent palliative radiation treatment in August due to a new hyper the intensive hypermetabolic mesenteric soft tissue mass in the RLQ consistent with lymphomatous recurrence     ROS:   No dizziness or episodes of syncope.  No palpitations.  She has a difficult time describing her chest pains,  "they are not exertional.  Not pleuritic.  Described mainly as a heaviness.  Often when she is lying down.  She denies any cough or shortness of breath.  No fever/chills/night sweats other than she had a temperature of 99 °F several weeks ago when she had some chills (normally she is <98.6).  She does note that she is \"always cold\".  No nausea or vomiting.  She has had occasional diarrhea-is afraid to eat because then she gets an urgent need to have a bowel movement every since her XRT.  Her leg edema is unchanged-it is better in the mornings.  She presently on torsemide Monday/Wednesday/Friday.  No recent unexplained weight changes.  No chronic cough or shortness of breath.  She is having increased problems with her arthritis since she was taken off Celebrex after being started on Eliquis.  Per her daughter, she is due to restart her Revlimid on Sunday.    PAST MEDICAL HISTORY   -Diffuse large B-cell lymphoma with recurrence-initially treated with R-CHOP for 6 cycles, recurrence with abdominal lymph nodes in 4/17 on Revlimid and Rituxan maintenance therapy since 3/2017.  Recent palliative 8/2024 XRT due to new hyperintense hypermetabolic mesenteric soft tissue mass consistent with recurrence.  -Hypertension  -Hypothyroidism  -CKD stage III  -Peripheral neuropathy  -Osteoporosis  -Valvular heart disease with aortic stenosis and moderate TR on echocardiogram 9/2023  -History of adeno CA in a colon polyp 11/2016  -DJD with knee replacements  -Urinary incontinence with history of bladder prolapse  -Depression/anxiety  -Glaucoma    PAST SURGICAL HISTORY:   -Remote tonsillectomy age 7  -Appendectomy  -Inguinal hernia repair for strangulated hernia in 1972  -Bilateral carpal tunnel syndrome repair  -Deviated nasal septoplasty  -Bilateral cataracts >30 years ago  -Remote D&C  -Bladder suspension  -Laparoscopic cholecystectomy in the 1990s  -Bilateral total knee replacements 1999  -Placement of IVC filter postop for " pulmonary emboli   -Colonoscopy 2008-redundant colon  -History of left knee arthroscopy ~  -Revision of failed left knee replacement 2012  -I&D of left knee abscess 2012  -C5-6, C6-7 anterior cervical discectomy and fusion 2012  -Revision of left total knee 5/15/2014  -CT-guided biopsy of abdominal mass 2015-diffuse large B-cell lymphoma  -Port placed 10/19/2015  -Colonoscopy 2016-polyp with adenocarcinoma, proctitis  -Laparoscopic to open mesenteric lymph node biopsy 2017 for recurrent DLBCL  -Colonoscopy 2017-resolved proctitis  -Colonoscopy 2022-healing anal fissure, diverticulosis, internal hemorrhoids  -Ventral hernia repair 10/19/2023  -JORDAN with DCCV 3/1/2024    FAMILY HISTORY:   -Father- age 92, had black lung  -Mother- age 92, had HTN & glaucoma  -Siblings-3 sisters, all  in their late 80s, unknown causes  -Children-1 daughter A&W, 1 son with history of leukemia    SOCIAL HISTORY:   -Tobacco-quit smoking in  after <1 pack daily for 15 years  -Alcohol-denies  -Drugs-denied  -Marital-she is , she lives with her daughter  -Occupation-retired nurses aide    ASSESSMENT/PLAN:   -Chest pain-MI ruled out, she does have some right costochondral pain on palpation.  Will trial a lidocaine patch.  -Hypomagnesemia-replaced in ER, 1.71 this morning.  She is on 400 mg of magnesium daily at home.  -Severe dysuria with possible UTI-outpatient urine culture done on 10/1 shows no growth.  Receiving ceftriaxone, but by history I suspect that her symptoms are more due to external/labial irritation-trying Calmoseptine cream to see if improves while awaiting repeat culture.  However she does have some immunosuppression due to her chronic chemotherapy.  -Chronic urinary incontinence  -Lower-mid abdominal discomfort-CT negative, lipase normal.  Minimal tenderness on palpation today.  She is s/p recent XRT to the abdomen for her recurrent lymphoma, as well as  "hernia repair done in 10/2023.  Suspect due to adhesions.  -Diffuse large B-cell lymphoma with recurrence, s/p recent XRT and on chronic Revlimid-not due to restart her Revlimid until Sunday.  -CKD stage III-will watch RFT's closely due to starting Miranda 2 inhibitor  -Hypertension-continuing her amlodipine and olmesartan, clonidine TTS.  -History of paroxysmal atrial fibrillation-continuing her Eliquis and metoprolol succinate, on telemetry  -Very symptomatic DJD-will cautiously trial Celebrex BID, did discuss with patient and her daughter the fact that she is on a blood thinner, but the Miranda 2 inhibitors are less likely to cause bleeding.  This may also help her chest wall pain.  -Hypothyroidism-continue her levothyroxine  -History of mild aortic stenosis and moderate tricuspid regurgitation  -Anxiety/depression  -Obesity with a BMI of 31.95    PHYSICAL EXAM:   I&O:  No intake or output data in the 24 hours ending 10/03/24 0942    Vital Signs:  Blood pressure 149/77, pulse 71, temperature 37.1 °C (98.8 °F), temperature source Temporal, resp. rate 20, height 1.778 m (5' 10\"), weight 101 kg (222 lb 10.6 oz), SpO2 95%.    Physical Exam:  -General appearance: Pleasant elderly white female, sitting up on the bedside commode, daughter in the room with her.  Patient has her normal amount of anxiety, alert, says she still is having some mid abdominal discomfort and pushing on her chest.  -Vital signs:  As above  -HEENT: Pupils are reactive, extraocular movements intact.  Lids, conjunctiva, sclera are normal.  Mouth/pharynx normal.  -Neck: No JVD or adenopathy  -Chest: Lungs are clear to auscultation.  She does have right costochondral chest wall pain on palpation.  -Cardiac: Regular rhythm, no obvious murmurs  -Abdomen: Soft, bowel sounds decreased but present.  She presently does not have any significant tenderness to palpation, no palpable masses or organomegaly.  -Extremities: Chronic lower extremity edema, pitting and " nonpitting.  -Skin: No rash  -Neurologic:   Patient is alert and oriented x3.  Cranial nerves II through XII are intact.  -Behavior/Emotional:  Appropriate, cooperative, has her normal chronic anxiety.    ALLERGIES:     Allergies   Allergen Reactions    Augmentin [Amoxicillin-Pot Clavulanate] Nausea/vomiting         Medications prior to admission:     Medications Prior to Admission   Medication Sig Dispense Refill Last Dose    amlodipine-olmesartan (Celia) 10-40 mg tablet Take 1 tablet by mouth once daily. 90 tablet 1 10/1/2024    acetaminophen (Tylenol) 325 mg tablet Take 2 tablets (650 mg) by mouth every 6 hours if needed.       apixaban (Eliquis) 5 mg tablet Take 1 tablet (5 mg) by mouth 2 times a day. 180 tablet 3     aspirin (Beulah Low Dose Aspirin) 81 mg EC tablet Take 1 tablet (81 mg) by mouth once daily.       cetirizine (ZyrTEC) 10 mg capsule 1 capsule (10 mg) once daily as needed.       cloNIDine (Catapres-TTS) 0.3 mg/24 hr patch Place 1 patch on the skin 1 (one) time per week for 30 doses. Apply one patach on the skin and replace every 7 days, as directed 12 patch 1     darifenacin (Enablex) 15 mg 24 hr tablet TAKE 1 TABLET BY MOUTH ONCE  DAILY (Patient taking differently: Take 1 tablet (15 mg) by mouth once daily. As needed) 90 tablet 3     docusate sodium (Colace) 100 mg capsule Take 1 capsule (100 mg) by mouth 2 times a day.       estradiol (Estrace) 0.01 % (0.1 mg/gram) vaginal cream Insert 0.0025 Applicatorfuls (0.01 g) into the vagina once daily at bedtime. 3 x a week 42.5 g 1     L.acidophilus-Bifido.longum (Probiotic Pearls Acidophilus) 1 billion cell capsule,delayed release(DR/EC) Take 1 capsule by mouth once daily. 30 capsule 1     latanoprost (Xalatan) 0.005 % ophthalmic solution Administer 1 drop into both eyes once daily at bedtime.       levothyroxine (Synthroid, Levoxyl) 50 mcg tablet Take 1 tablet (50 mcg) by mouth once daily. 90 tablet 1     metoprolol succinate XL (Toprol-XL) 25 mg 24 hr  tablet Take 0.5 tablets (12.5 mg) by mouth once daily. Do not crush or chew. 90 tablet 1     nitroglycerin (Nitrostat) 0.4 mg SL tablet Place 1 tablet (0.4 mg) under the tongue every 5 minutes if needed for chest pain. May repeat dose every 5 minutes for up to 3 doses total. 25 tablet 5     omega-3 acid ethyl esters (Lovaza) 1 gram capsule Take 1 capsule (1 g) by mouth 2 times a day.       Revlimid 5 mg capsule Take 1 capsule (5 mg) by mouth once daily. As directed per oncology       spironolactone (Aldactone) 25 mg tablet Take 0.5 tablets (12.5 mg) by mouth once daily. 90 tablet 1     timolol (Timoptic) 0.5 % ophthalmic solution Administer 1 drop into affected eye(s) every 12 hours.       torsemide (Demadex) 10 mg tablet Take 1 tablet (10 mg) by mouth once a day on Monday, Wednesday, and Friday. 36 tablet 3     traMADol (Ultram) 50 mg tablet Take 1 tablet (50 mg) by mouth every 8 hours if needed for moderate pain (4 - 6) for up to 20 doses. 10 tablet 0     trospium (Sanctura) 20 mg tablet Take 1 tablet (20 mg) by mouth every 12 hours.       valACYclovir (Valtrex) 500 mg tablet Take 1 tablet (500 mg) by mouth once daily. 1/2 tablet twice daily 3 times a week. M,W,F       vitamin E 180 mg (400 unit) capsule Take 1 capsule (400 Units) by mouth once daily.       zinc sulfate (ZINC-15 ORAL) Take 1 tablet by mouth once daily.           Present Medications:  Scheduled medications   Medication Dose Route Frequency    amlodipine-olmesartan  1 tablet oral Daily    apixaban  5 mg oral BID    aspirin  81 mg oral Daily    cefTRIAXone  1 g intravenous Once    [START ON 10/6/2024] cloNIDine  1 patch transdermal Every Sunday    icosapent ethyL  2 g oral BID    lactobacillus acidophilus  1 capsule oral Daily    latanoprost  1 drop Both Eyes Nightly    levothyroxine  50 mcg oral Daily    metoprolol succinate XL  12.5 mg oral Daily    morphine  4 mg intravenous Once    ondansetron  4 mg intravenous Once    oxybutynin  5 mg oral TID     spironolactone  12.5 mg oral Daily    timolol  1 drop ophthalmic (eye) q12h    [START ON 10/4/2024] torsemide  10 mg oral Every Mon/Wed/Fri        PRN medications   Medication    acetaminophen    alum-mag hydroxide-simeth    magnesium hydroxide    melatonin    nitroglycerin    ondansetron    ondansetron         Labs:    CBC:   Results from last 7 days   Lab Units 10/03/24  0432 10/02/24  1359   WBC AUTO x10*3/uL 4.9 5.0   RBC AUTO x10*6/uL 2.96* 3.44*   HEMOGLOBIN g/dL 10.2* 11.8*   HEMATOCRIT % 32.1* 37.8   MCV fL 108* 110*   MCH pg 34.5* 34.3*   MCHC g/dL 31.8* 31.2*   RDW % 13.4 14.0   PLATELETS AUTO x10*3/uL 161 204     CMP:    Results from last 7 days   Lab Units 10/03/24  0432 10/02/24  1359   SODIUM mmol/L 141 141   POTASSIUM mmol/L 3.5 3.6   CHLORIDE mmol/L 109* 112*   CO2 mmol/L 25 22   BUN mg/dL 12 12   CREATININE mg/dL 0.83 0.83   GLUCOSE mg/dL 71* 88   PROTEIN TOTAL g/dL  --  5.5*   CALCIUM mg/dL 8.5* 8.1*   BILIRUBIN TOTAL mg/dL  --  0.5   ALK PHOS U/L  --  78   AST U/L  --  11   ALT U/L  --  6*       Magnesium:    Results from last 7 days   Lab Units 10/03/24  0432 10/02/24  1359   MAGNESIUM mg/dL 1.71 1.37*     Troponin:    Results from last 7 days   Lab Units 10/03/24  0045 10/02/24  1905 10/02/24  1359   TROPHS ng/L 10 8 7       Results for orders placed or performed during the hospital encounter of 10/02/24 (from the past 24 hour(s))   Lipase   Result Value Ref Range    Lipase 16 9 - 82 U/L   Lactate   Result Value Ref Range    Lactate 0.6 0.4 - 2.0 mmol/L   Hepatic Function Panel   Result Value Ref Range    Albumin 3.5 3.4 - 5.0 g/dL    Bilirubin, Total 0.5 0.0 - 1.2 mg/dL    Bilirubin, Direct 0.1 0.0 - 0.3 mg/dL    Alkaline Phosphatase 78 33 - 136 U/L    ALT 6 (L) 7 - 45 U/L    AST 11 9 - 39 U/L    Total Protein 5.5 (L) 6.4 - 8.2 g/dL   Magnesium   Result Value Ref Range    Magnesium 1.37 (L) 1.60 - 2.40 mg/dL   Basic Metabolic Panel   Result Value Ref Range    Glucose 88 74 - 99 mg/dL     Sodium 141 136 - 145 mmol/L    Potassium 3.6 3.5 - 5.3 mmol/L    Chloride 112 (H) 98 - 107 mmol/L    Bicarbonate 22 21 - 32 mmol/L    Anion Gap 11 10 - 20 mmol/L    Urea Nitrogen 12 6 - 23 mg/dL    Creatinine 0.83 0.50 - 1.05 mg/dL    eGFR 68 >60 mL/min/1.73m*2    Calcium 8.1 (L) 8.6 - 10.3 mg/dL   CBC and Auto Differential   Result Value Ref Range    WBC 5.0 4.4 - 11.3 x10*3/uL    nRBC 0.0 0.0 - 0.0 /100 WBCs    RBC 3.44 (L) 4.00 - 5.20 x10*6/uL    Hemoglobin 11.8 (L) 12.0 - 16.0 g/dL    Hematocrit 37.8 36.0 - 46.0 %     (H) 80 - 100 fL    MCH 34.3 (H) 26.0 - 34.0 pg    MCHC 31.2 (L) 32.0 - 36.0 g/dL    RDW 14.0 11.5 - 14.5 %    Platelets 204 150 - 450 x10*3/uL    Neutrophils % 38.5 40.0 - 80.0 %    Immature Granulocytes %, Automated 0.6 0.0 - 0.9 %    Lymphocytes % 43.6 13.0 - 44.0 %    Monocytes % 13.9 2.0 - 10.0 %    Eosinophils % 1.6 0.0 - 6.0 %    Basophils % 1.8 0.0 - 2.0 %    Neutrophils Absolute 1.92 1.60 - 5.50 x10*3/uL    Immature Granulocytes Absolute, Automated 0.03 0.00 - 0.50 x10*3/uL    Lymphocytes Absolute 2.17 0.80 - 3.00 x10*3/uL    Monocytes Absolute 0.69 0.05 - 0.80 x10*3/uL    Eosinophils Absolute 0.08 0.00 - 0.40 x10*3/uL    Basophils Absolute 0.09 0.00 - 0.10 x10*3/uL   Troponin I, High Sensitivity   Result Value Ref Range    Troponin I, High Sensitivity 7 0 - 13 ng/L   Light Blue Top   Result Value Ref Range    Extra Tube Hold for add-ons.    Lavender Top   Result Value Ref Range    Extra Tube Hold for add-ons.    ECG 12 Lead   Result Value Ref Range    Ventricular Rate 61 BPM    Atrial Rate 61 BPM    RI Interval 210 ms    QRS Duration 96 ms    QT Interval 410 ms    QTC Calculation(Bazett) 412 ms    R Axis -55 degrees    T Axis 96 degrees    QRS Count 10 beats    Q Onset 226 ms    P Onset 121 ms    P Offset 171 ms    T Offset 431 ms    QTC Fredericia 412 ms   Urinalysis with Reflex Culture and Microscopic   Result Value Ref Range    Color, Urine Orange (N) Straw, Yellow    Appearance,  Urine Clear Clear   Extra Urine Gray Tube   Result Value Ref Range    Extra Tube Hold for add-ons.    Microscopic Only, Urine   Result Value Ref Range    WBC, Urine 21-50 (A) 1-5, NONE /HPF    RBC, Urine 6-10 (A) NONE, 1-2, 3-5 /HPF    Squamous Epithelial Cells, Urine 1-9 (SPARSE) Reference range not established. /HPF    Bacteria, Urine 2+ (A) NONE SEEN /HPF    Mucus, Urine FEW Reference range not established. /LPF    Hyaline Casts, Urine OCCASIONAL (A) NONE /LPF    Amorphous Crystals, Urine 1+ NONE, 1+, 2+ /HPF   ECG 12 lead   Result Value Ref Range    Ventricular Rate 62 BPM    Atrial Rate 62 BPM    GA Interval 244 ms    QRS Duration 80 ms    QT Interval 426 ms    QTC Calculation(Bazett) 432 ms    P Axis 90 degrees    R Axis 56 degrees    T Axis 37 degrees    QRS Count 11 beats    Q Onset 226 ms    P Onset 104 ms    P Offset 153 ms    T Offset 439 ms    QTC Fredericia 430 ms   Troponin I, High Sensitivity   Result Value Ref Range    Troponin I, High Sensitivity 8 0 - 13 ng/L   Troponin I, High Sensitivity   Result Value Ref Range    Troponin I, High Sensitivity 10 0 - 13 ng/L   Basic Metabolic Panel   Result Value Ref Range    Glucose 71 (L) 74 - 99 mg/dL    Sodium 141 136 - 145 mmol/L    Potassium 3.5 3.5 - 5.3 mmol/L    Chloride 109 (H) 98 - 107 mmol/L    Bicarbonate 25 21 - 32 mmol/L    Anion Gap 11 10 - 20 mmol/L    Urea Nitrogen 12 6 - 23 mg/dL    Creatinine 0.83 0.50 - 1.05 mg/dL    eGFR 68 >60 mL/min/1.73m*2    Calcium 8.5 (L) 8.6 - 10.3 mg/dL   Magnesium   Result Value Ref Range    Magnesium 1.71 1.60 - 2.40 mg/dL   CBC   Result Value Ref Range    WBC 4.9 4.4 - 11.3 x10*3/uL    nRBC 0.0 0.0 - 0.0 /100 WBCs    RBC 2.96 (L) 4.00 - 5.20 x10*6/uL    Hemoglobin 10.2 (L) 12.0 - 16.0 g/dL    Hematocrit 32.1 (L) 36.0 - 46.0 %     (H) 80 - 100 fL    MCH 34.5 (H) 26.0 - 34.0 pg    MCHC 31.8 (L) 32.0 - 36.0 g/dL    RDW 13.4 11.5 - 14.5 %    Platelets 161 150 - 450 x10*3/uL     Urinalysis:    Lab Results    Component Value Date    COLORU Orange (N) 10/02/2024    APPEARANCEU Clear 10/02/2024    SPECGRAVU 1.013 10/01/2024    REBECCA 5.5 10/01/2024    PROTUR NEGATIVE 10/01/2024    GLUCOSEU Normal 10/01/2024    BLOODU NEGATIVE 10/01/2024    KETONESU NEGATIVE 10/01/2024    BILIRUBINU NEGATIVE 10/01/2024    UROBILINOGEN Normal 10/01/2024    NITRITEU NEGATIVE 10/01/2024    LEUKOCYTESU NEGATIVE 10/01/2024    WBCU 21-50 (A) 10/02/2024    RBCU 6-10 (A) 10/02/2024    SQUAMEPIU 1-9 (SPARSE) 10/02/2024    BACTERIAU 2+ (A) 10/02/2024    MUCUSU FEW 10/02/2024         Radiology:  CT abdomen pelvis wo IV contrast   Final Result   1. No hydronephrosis, hydroureter or renal/ureteral calculus.   2. No evidence of bowel obstruction, free intraperitoneal air or   abnormal intra-abdominal fluid collection.        MACRO:   None        Signed by: Everett Schwartz 10/2/2024 2:49 PM   Dictation workstation:   [id number]           Soraya Schneider MD      *Some of this note was completed using Dragon voice recognition technology and may include unintended errors with respect to translation of words, typographical errors or grammar errors which may not have been identified prior to finalization of the chart note.

## 2024-10-03 NOTE — CARE PLAN
The patient's goals for the shift include      The clinical goals for the shift include Pt will be free from fall/injury throughout this shift.    Over the shift, the patient did not make progress toward the following goals. Barriers to progression include . Recommendations to address these barriers include .

## 2024-10-03 NOTE — CARE PLAN
The patient's goals for the shift include  Pt states she wants to rest    The clinical goals for the shift include Pt will be free from fall/injury throughout this shift.

## 2024-10-03 NOTE — PROGRESS NOTES
10/03/24 1530   Discharge Planning   Living Arrangements Children  (Lives with her daughter Soraya)   Support Systems Family members   Type of Residence Private residence   Expected Discharge Disposition Home   Does the patient need discharge transport arranged? No   RoundTrip coordination needed? No     Met with pt who reports she lives with her daughter. Uses walker at home. Daughter provides transportation. + UTI. Denies dc needs. Plan for discharge home.

## 2024-10-04 VITALS
WEIGHT: 222.66 LBS | OXYGEN SATURATION: 94 % | DIASTOLIC BLOOD PRESSURE: 62 MMHG | RESPIRATION RATE: 20 BRPM | TEMPERATURE: 98.4 F | HEART RATE: 62 BPM | SYSTOLIC BLOOD PRESSURE: 123 MMHG | HEIGHT: 70 IN | BODY MASS INDEX: 31.88 KG/M2

## 2024-10-04 LAB
ANION GAP SERPL CALC-SCNC: 14 MMOL/L (ref 10–20)
BUN SERPL-MCNC: 12 MG/DL (ref 6–23)
CALCIUM SERPL-MCNC: 9 MG/DL (ref 8.6–10.3)
CHLORIDE SERPL-SCNC: 107 MMOL/L (ref 98–107)
CO2 SERPL-SCNC: 24 MMOL/L (ref 21–32)
CREAT SERPL-MCNC: 0.95 MG/DL (ref 0.5–1.05)
EGFRCR SERPLBLD CKD-EPI 2021: 58 ML/MIN/1.73M*2
ERYTHROCYTE [DISTWIDTH] IN BLOOD BY AUTOMATED COUNT: 13.7 % (ref 11.5–14.5)
GLUCOSE SERPL-MCNC: 100 MG/DL (ref 74–99)
HCT VFR BLD AUTO: 36.3 % (ref 36–46)
HGB BLD-MCNC: 11.8 G/DL (ref 12–16)
MAGNESIUM SERPL-MCNC: 1.66 MG/DL (ref 1.6–2.4)
MCH RBC QN AUTO: 34.7 PG (ref 26–34)
MCHC RBC AUTO-ENTMCNC: 32.5 G/DL (ref 32–36)
MCV RBC AUTO: 107 FL (ref 80–100)
NRBC BLD-RTO: 0 /100 WBCS (ref 0–0)
PLATELET # BLD AUTO: 186 X10*3/UL (ref 150–450)
POTASSIUM SERPL-SCNC: 3.7 MMOL/L (ref 3.5–5.3)
RBC # BLD AUTO: 3.4 X10*6/UL (ref 4–5.2)
SODIUM SERPL-SCNC: 141 MMOL/L (ref 136–145)
WBC # BLD AUTO: 6.7 X10*3/UL (ref 4.4–11.3)

## 2024-10-04 PROCEDURE — 80048 BASIC METABOLIC PNL TOTAL CA: CPT | Performed by: INTERNAL MEDICINE

## 2024-10-04 PROCEDURE — 2500000005 HC RX 250 GENERAL PHARMACY W/O HCPCS: Performed by: INTERNAL MEDICINE

## 2024-10-04 PROCEDURE — 83735 ASSAY OF MAGNESIUM: CPT | Performed by: INTERNAL MEDICINE

## 2024-10-04 PROCEDURE — 85027 COMPLETE CBC AUTOMATED: CPT | Performed by: INTERNAL MEDICINE

## 2024-10-04 PROCEDURE — 99239 HOSP IP/OBS DSCHRG MGMT >30: CPT | Performed by: INTERNAL MEDICINE

## 2024-10-04 PROCEDURE — 2500000001 HC RX 250 WO HCPCS SELF ADMINISTERED DRUGS (ALT 637 FOR MEDICARE OP): Performed by: INTERNAL MEDICINE

## 2024-10-04 PROCEDURE — 2500000002 HC RX 250 W HCPCS SELF ADMINISTERED DRUGS (ALT 637 FOR MEDICARE OP, ALT 636 FOR OP/ED): Performed by: INTERNAL MEDICINE

## 2024-10-04 RX ORDER — TROSPIUM CHLORIDE 20 MG/1
20 TABLET, FILM COATED ORAL 2 TIMES DAILY
Qty: 60 TABLET | Refills: 1 | Status: SHIPPED | OUTPATIENT
Start: 2024-10-04 | End: 2024-12-03

## 2024-10-04 RX ORDER — TROSPIUM CHLORIDE 20 MG/1
20 TABLET, FILM COATED ORAL EVERY 12 HOURS
Qty: 180 TABLET | Refills: 11 | OUTPATIENT
Start: 2024-10-04

## 2024-10-04 RX ORDER — CELECOXIB 100 MG/1
100 CAPSULE ORAL DAILY
Qty: 30 CAPSULE | Refills: 0 | Status: SHIPPED | OUTPATIENT
Start: 2024-10-04 | End: 2024-11-03

## 2024-10-04 RX ORDER — MENTHOL AND ZINC OXIDE .44; 20.625 G/100G; G/100G
1 OINTMENT TOPICAL 4 TIMES DAILY PRN
Qty: 20 G | Refills: 1 | Status: SHIPPED | OUTPATIENT
Start: 2024-10-04

## 2024-10-04 RX ORDER — HEPARIN 100 UNIT/ML
5 SYRINGE INTRAVENOUS AS NEEDED
Status: DISCONTINUED | OUTPATIENT
Start: 2024-10-04 | End: 2024-10-04 | Stop reason: HOSPADM

## 2024-10-04 RX ORDER — LIDOCAINE 560 MG/1
1 PATCH PERCUTANEOUS; TOPICAL; TRANSDERMAL DAILY
Qty: 28 PATCH | Refills: 0 | Status: SHIPPED | OUTPATIENT
Start: 2024-10-05

## 2024-10-04 ASSESSMENT — COGNITIVE AND FUNCTIONAL STATUS - GENERAL
MOVING FROM LYING ON BACK TO SITTING ON SIDE OF FLAT BED WITH BEDRAILS: A LITTLE
MOVING TO AND FROM BED TO CHAIR: A LOT
TOILETING: A LITTLE
TOILETING: A LITTLE
DRESSING REGULAR UPPER BODY CLOTHING: A LITTLE
EATING MEALS: A LITTLE
DRESSING REGULAR LOWER BODY CLOTHING: A LITTLE
DRESSING REGULAR UPPER BODY CLOTHING: A LITTLE
CLIMB 3 TO 5 STEPS WITH RAILING: TOTAL
HELP NEEDED FOR BATHING: A LOT
MOBILITY SCORE: 13
PERSONAL GROOMING: A LITTLE
PERSONAL GROOMING: A LITTLE
WALKING IN HOSPITAL ROOM: A LOT
WALKING IN HOSPITAL ROOM: A LOT
MOVING FROM LYING ON BACK TO SITTING ON SIDE OF FLAT BED WITH BEDRAILS: A LITTLE
TURNING FROM BACK TO SIDE WHILE IN FLAT BAD: A LITTLE
MOVING TO AND FROM BED TO CHAIR: A LOT
DRESSING REGULAR LOWER BODY CLOTHING: A LITTLE
STANDING UP FROM CHAIR USING ARMS: A LOT
EATING MEALS: A LITTLE
MOBILITY SCORE: 13
STANDING UP FROM CHAIR USING ARMS: A LOT
TURNING FROM BACK TO SIDE WHILE IN FLAT BAD: A LITTLE
HELP NEEDED FOR BATHING: A LOT
DAILY ACTIVITIY SCORE: 17
DAILY ACTIVITIY SCORE: 17
CLIMB 3 TO 5 STEPS WITH RAILING: TOTAL

## 2024-10-04 ASSESSMENT — PAIN SCALES - GENERAL: PAINLEVEL_OUTOF10: 0 - NO PAIN

## 2024-10-04 NOTE — PROGRESS NOTES
Laura Woodward is a 89 y.o. female on day 1 of admission presenting with Chest pain.      ASSESSMENT/PLAN   Principal Problem:  -Chest pain-MI ruled out, she does have some right costochondral pain on palpation.  Pain improved with the lidocaine patch.  Discharge home today.  Active Problems:  -Hypomagnesemia-replaced in ER, 1.66 this morning.  She is on 400 mg of magnesium daily at home-to continue after discharge.  -Severe dysuria with possible UTI-outpatient urine culture done on 10/1 shows no growth.  Calmoseptine placed last night, has helped the area some.  Will give her prescription for use at home.  -Chronic urinary incontinence-previously on Sanctura, was controlling her urine then.  Will give refill on discharge.  -Lower-mid abdominal discomfort-CT negative, lipase normal.  Now resolved.  -DJD-started back on Celebrex last night, she has not noticed significant difference but has not been up using a walker-her main symptoms are hand pain with using her walker.  Discussed with patient and daughter to try using just in the mornings.    -Diffuse large B-cell lymphoma with recurrence, s/p recent XRT and on chronic Revlimid-not due to restart her Revlimid until Sunday.  -CKD stage III-creatinine normal at 0.95.  -Hypertension-controlled on amlodipine/olmesartan, clonidine TTS.  -History of paroxysmal atrial fibrillation-continuing her Eliquis and metoprolol succinate, on telemetry no A-fib.  -Hypothyroidism-on levothyroxine  -History of mild aortic stenosis and moderate tricuspid regurgitation  -Anxiety/depression  -Obesity with a BMI of 31.95    SUBJECTIVE/OBJECTIVE   10/04/24   -Burning is a little bit better-does not remember having the Calmoseptine cream placed on her perineum last night (but nurse verifies that it was).  -Chest pain better with the lidocaine patch.  -Has not noticed any improvement with the Celebrex yet, but has not been using her walker-her main problem is hand pain when she is  using her walker at home.  -Having significant urinary incontinence when she stands up-in the past was on Sanctura with good results, but ran out.  Discussed with the patient and her daughter I will restart it on discharge.  However she also has darifenacin listed in her medications, advised them not to use both.  -She is afebrile, vital signs stable.  Satting 95% on room air.  -Yesterday evening was somewhat tachypneic, but was in the setting of anxiety.  No longer tachypneic.  -On telemetry remains in sinus rhythm with no dysrhythmias noted.  -Chest is clear to auscultation.  Improved chest wall tenderness.  -Cardiac exam is a regular rhythm  -Extremities with bruising and several skin tears.  Discussed with them using Vaseline when she gets skin tears then covering with a nonstick dressing.  -Chemistry panel with a blood sugar of 100, normal electrolytes.  BUN 12 with a creatinine of 0.95.  Calcium 9.0.  -Magnesium remains normal at 1.66 after low of 1.37 on admission.  -CBC with a WBC of 6.7, H/H stable at 11.8/36.3.  Platelet count 186 K  -Will discharge home today.    *These notes are being done using Dragon voice recognition technology and may include unintended errors with respect to translation of words, typographical errors or grammar errors which may not have been identified prior to finalization of the chart note.    CURRENT MEDICATIONS     Scheduled Medications:    Current Facility-Administered Medications:     acetaminophen (Tylenol) tablet 650 mg, 650 mg, oral, q4h PRN, Soraya Schneider MD, 650 mg at 10/03/24 1239    alum-mag hydroxide-simeth (Mylanta) 200-200-20 mg/5 mL oral suspension 30 mL, 30 mL, oral, 4x daily PRN, Soraya Schneider MD    amlodipine-olmesartan (Celia) 10-40 mg per tablet 1 tablet, 1 tablet, oral, Daily, Soraya Schneider MD, 1 tablet at 10/04/24 0603    apixaban (Eliquis) tablet 5 mg, 5 mg, oral, BID, Soraya Schneider MD, 5 mg at 10/04/24 0801    aspirin EC tablet 81 mg, 81 mg, oral, Daily, Soraya Schneider  MD, 81 mg at 10/04/24 0801    celecoxib (CeleBREX) capsule 100 mg, 100 mg, oral, BID, Soraya Schneider MD, 100 mg at 10/04/24 0801    [START ON 10/6/2024] cloNIDine (Catapres-TTS) 0.3 mg/24 hr patch 1 patch, 1 patch, transdermal, Every Sunday, Soraya Schneider MD    icosapent ethyL (Vascepa) capsule 2 g, 2 g, oral, BID, Soraya Schneider MD, 2 g at 10/04/24 0801    lactobacillus acidophilus tablet 1 tablet, 1 tablet, oral, Daily, Soraya Schneider MD, 1 tablet at 10/04/24 0801    latanoprost (Xalatan) 0.005 % ophthalmic solution 1 drop, 1 drop, Both Eyes, Nightly, Soraya Schneider MD, 1 drop at 10/03/24 2117    levothyroxine (Synthroid, Levoxyl) tablet 50 mcg, 50 mcg, oral, Daily, Soraya Schneider MD, 50 mcg at 10/04/24 0603    lidocaine 4 % patch 1 patch, 1 patch, transdermal, Daily, Soraya Schneider MD, 1 patch at 10/04/24 0813    magnesium hydroxide (Milk of Magnesia) 2,400 mg/10 mL suspension 10 mL, 10 mL, oral, Daily PRN, Soraya Schneider MD    melatonin tablet 5 mg, 5 mg, oral, Nightly PRN, Soraya Schneider MD, 5 mg at 10/04/24 0207    menthol-zinc oxide (Calmoseptine - Risamine) 0.44-20.6 % ointment 1 Application, 1 Application, Topical, 4x daily PRN, Soraya Schneider MD    metoprolol succinate XL (Toprol-XL) 24 hr tablet 12.5 mg, 12.5 mg, oral, Daily, Soraya Schneider MD, 12.5 mg at 10/04/24 0801    morphine injection 4 mg, 4 mg, intravenous, Once, Uzma Phillips MD    nitroglycerin (Nitrostat) SL tablet 0.4 mg, 0.4 mg, sublingual, q5 min PRN, Soraya Schneider MD    ondansetron (Zofran) injection 4 mg, 4 mg, intravenous, Once, Uzma Phillips MD    ondansetron (Zofran) injection 4 mg, 4 mg, intravenous, q4h PRN, Soraya Schneider MD    ondansetron (Zofran) tablet 4 mg, 4 mg, oral, q8h PRN, Soraya Schneider MD    oxybutynin (Ditropan) tablet 5 mg, 5 mg, oral, TID, Soraya Schneider MD, 5 mg at 10/04/24 0801    phenazopyridine (Pyridium) tablet 100 mg, 100 mg, oral, TID, Soraya Schneider MD, 100 mg at 10/04/24 0801    spironolactone (Aldactone) tablet 12.5 mg, 12.5 mg, oral, Daily, Soraya  "MD Wyatt, 12.5 mg at 10/04/24 0801    timolol (Timoptic) 0.5 % ophthalmic solution 1 drop, 1 drop, ophthalmic (eye), q12h, Soraya Schneider MD, 1 drop at 10/04/24 0604    torsemide (Demadex) tablet 10 mg, 10 mg, oral, Every Mon/Wed/Fri, Soraya Schneider MD, 10 mg at 10/04/24 0801     PRN Medications:  PRN medications   Medication    acetaminophen    alum-mag hydroxide-simeth    magnesium hydroxide    melatonin    menthol-zinc oxide    nitroglycerin    ondansetron    ondansetron         IVs:        I&Os     Intake/Output last 3 Shifts:  I/O last 3 completed shifts:  In: 100 (1 mL/kg) [I.V.:50 (0.5 mL/kg); IV Piggyback:50]  Out: - (0 mL/kg)   Weight: 101 kg     VITAL SIGNS   Visit Vitals  /62   Pulse 62   Temp 36.9 °C (98.4 °F)   Resp 20     Blood Pressures         10/2/2024  2326 10/3/2024  0548 10/3/2024  1210 10/3/2024  1943 10/4/2024  0534    BP: 150/72 149/77 138/71 132/70 123/62            PHYSICAL EXAM   Physical exam:  -General appearance: Pleasant elderly white female, lying in bed alert and in NAD.  Daughter is in the room with her.  -Vital signs:  As above  -HEENT: No icterus  -Neck: No JVD  -Chest: Lungs are clear to auscultation  -Cardiac: Regular rhythm, improved chest wall tenderness  -Abdomen: Bowel sounds active  -Extremities: No change in her usual lower extremity edema  -Skin: Several bruises, right forearm skin tear with dressing in place  -Neurologic:   Patient is alert and oriented x3.  Mild memory lapses  -Behavior/Emotional:  Appropriate, cooperative    LABS   Relevant Results:  Results from last 7 days   Lab Units 10/04/24  0521 10/03/24  0432 10/02/24  1359   GLUCOSE mg/dL 100* 71* 88      HbA1c:  No results found for: \"HGBA1C\"  CBC:   Lab Results   Component Value Date    WBC 6.7 10/04/2024    HGB 11.8 (L) 10/04/2024    HCT 36.3 10/04/2024     (H) 10/04/2024     10/04/2024       Results from last 7 days   Lab Units 10/04/24  0521 10/03/24  0432 10/02/24  1359   WBC AUTO x10*3/uL " "6.7   < > 5.0   HEMOGLOBIN g/dL 11.8*   < > 11.8*   HEMATOCRIT % 36.3   < > 37.8   PLATELETS AUTO x10*3/uL 186   < > 204   NEUTROS PCT AUTO %  --   --  38.5   LYMPHS PCT AUTO %  --   --  43.6   MONOS PCT AUTO %  --   --  13.9   EOS PCT AUTO %  --   --  1.6    < > = values in this interval not displayed.     ESR:    Lab Results   Component Value Date    SEDRATE 10 01/13/2024     CMP:    Results from last 7 days   Lab Units 10/04/24  0521 10/03/24  0432 10/02/24  1359   SODIUM mmol/L 141 141 141   POTASSIUM mmol/L 3.7 3.5 3.6   CHLORIDE mmol/L 107 109* 112*   CO2 mmol/L 24 25 22   BUN mg/dL 12 12 12   CREATININE mg/dL 0.95 0.83 0.83   CALCIUM mg/dL 9.0 8.5* 8.1*   PROTEIN TOTAL g/dL  --   --  5.5*   BILIRUBIN TOTAL mg/dL  --   --  0.5   ALK PHOS U/L  --   --  78   ALT U/L  --   --  6*   AST U/L  --   --  11   GLUCOSE mg/dL 100* 71* 88     Magnesium:   Results from last 7 days   Lab Units 10/04/24  0521 10/03/24  0432 10/02/24  1359   MAGNESIUM mg/dL 1.66 1.71 1.37*     Troponin:  No results found for: \"TROPONINT\"  INR:    Lab Results   Component Value Date    INR 2.1 (H) 02/26/2024    INR 1.0 01/12/2024    INR 1.0 05/12/2022    PROTIME 23.8 (H) 02/26/2024    PROTIME 10.8 01/12/2024    PROTIME 11.2 05/12/2022     Lipid Panel:    Lab Results   Component Value Date    CHOL 160 12/08/2022    CHOL 141 11/10/2021     Lab Results   Component Value Date    HDL 51.7 12/08/2022    HDL 44.0 11/10/2021     No results found for: \"LDLCALC\"  Lab Results   Component Value Date    TRIG 133 12/08/2022    TRIG 155 (H) 11/10/2021     Urinalysis:    Lab Results   Component Value Date    COLORU Orange (N) 10/02/2024    APPEARANCEU Clear 10/02/2024    SPECGRAVU 1.013 10/01/2024    REBECCA 5.5 10/01/2024    PROTUR NEGATIVE 10/01/2024    GLUCOSEU Normal 10/01/2024    BLOODU NEGATIVE 10/01/2024    KETONESU NEGATIVE 10/01/2024    BILIRUBINU NEGATIVE 10/01/2024    UROBILINOGEN Normal 10/01/2024    NITRITEU NEGATIVE 10/01/2024    LEUKOCYTESU NEGATIVE " 10/01/2024    WBCU 21-50 (A) 10/02/2024    RBCU 6-10 (A) 10/02/2024    SQUAMEPIU 1-9 (SPARSE) 10/02/2024    BACTERIAU 2+ (A) 10/02/2024    MUCUSU FEW 10/02/2024         Results for orders placed or performed during the hospital encounter of 10/02/24 (from the past 24 hour(s))   Basic Metabolic Panel   Result Value Ref Range    Glucose 100 (H) 74 - 99 mg/dL    Sodium 141 136 - 145 mmol/L    Potassium 3.7 3.5 - 5.3 mmol/L    Chloride 107 98 - 107 mmol/L    Bicarbonate 24 21 - 32 mmol/L    Anion Gap 14 10 - 20 mmol/L    Urea Nitrogen 12 6 - 23 mg/dL    Creatinine 0.95 0.50 - 1.05 mg/dL    eGFR 58 (L) >60 mL/min/1.73m*2    Calcium 9.0 8.6 - 10.3 mg/dL   Magnesium   Result Value Ref Range    Magnesium 1.66 1.60 - 2.40 mg/dL   CBC   Result Value Ref Range    WBC 6.7 4.4 - 11.3 x10*3/uL    nRBC 0.0 0.0 - 0.0 /100 WBCs    RBC 3.40 (L) 4.00 - 5.20 x10*6/uL    Hemoglobin 11.8 (L) 12.0 - 16.0 g/dL    Hematocrit 36.3 36.0 - 46.0 %     (H) 80 - 100 fL    MCH 34.7 (H) 26.0 - 34.0 pg    MCHC 32.5 32.0 - 36.0 g/dL    RDW 13.7 11.5 - 14.5 %    Platelets 186 150 - 450 x10*3/uL     Results for orders placed or performed during the hospital encounter of 10/02/24 (from the past 24 hour(s))   Basic Metabolic Panel   Result Value Ref Range    Glucose 100 (H) 74 - 99 mg/dL    Sodium 141 136 - 145 mmol/L    Potassium 3.7 3.5 - 5.3 mmol/L    Chloride 107 98 - 107 mmol/L    Bicarbonate 24 21 - 32 mmol/L    Anion Gap 14 10 - 20 mmol/L    Urea Nitrogen 12 6 - 23 mg/dL    Creatinine 0.95 0.50 - 1.05 mg/dL    eGFR 58 (L) >60 mL/min/1.73m*2    Calcium 9.0 8.6 - 10.3 mg/dL   Magnesium   Result Value Ref Range    Magnesium 1.66 1.60 - 2.40 mg/dL   CBC   Result Value Ref Range    WBC 6.7 4.4 - 11.3 x10*3/uL    nRBC 0.0 0.0 - 0.0 /100 WBCs    RBC 3.40 (L) 4.00 - 5.20 x10*6/uL    Hemoglobin 11.8 (L) 12.0 - 16.0 g/dL    Hematocrit 36.3 36.0 - 46.0 %     (H) 80 - 100 fL    MCH 34.7 (H) 26.0 - 34.0 pg    MCHC 32.5 32.0 - 36.0 g/dL    RDW  13.7 11.5 - 14.5 %    Platelets 186 150 - 450 x10*3/uL       IMAGING     CT abdomen pelvis wo IV contrast   Final Result   1. No hydronephrosis, hydroureter or renal/ureteral calculus.   2. No evidence of bowel obstruction, free intraperitoneal air or   abnormal intra-abdominal fluid collection.        MACRO:   None        Signed by: Everett Schwartz 10/2/2024 2:49 PM   Dictation workstation:   [id number]           Soraya Schneider MD

## 2024-10-04 NOTE — CARE PLAN
The patient's goals for the shift include      The clinical goals for the shift include Safety    Over the shift, the patient did not make progress toward the following goals. Barriers to progression include . Recommendations to address these barriers include .

## 2024-10-04 NOTE — CARE PLAN
The patient's goals for the shift include      The clinical goals for the shift include Safety      Problem: Pain - Adult  Goal: Verbalizes/displays adequate comfort level or baseline comfort level  Outcome: Progressing     Problem: Safety - Adult  Goal: Free from fall injury  Outcome: Progressing     Problem: Discharge Planning  Goal: Discharge to home or other facility with appropriate resources  Outcome: Progressing     Problem: Chronic Conditions and Co-morbidities  Goal: Patient's chronic conditions and co-morbidity symptoms are monitored and maintained or improved  Outcome: Progressing     Problem: Skin  Goal: Decreased wound size/increased tissue granulation at next dressing change  Outcome: Progressing  Flowsheets (Taken 10/4/2024 0135)  Decreased wound size/increased tissue granulation at next dressing change: Promote sleep for wound healing  Goal: Participates in plan/prevention/treatment measures  Outcome: Progressing  Flowsheets (Taken 10/4/2024 0135)  Participates in plan/prevention/treatment measures:   Elevate heels   Increase activity/out of bed for meals  Goal: Prevent/manage excess moisture  Outcome: Progressing  Flowsheets (Taken 10/4/2024 0135)  Prevent/manage excess moisture: Monitor for/manage infection if present  Goal: Prevent/minimize sheer/friction injuries  Outcome: Progressing  Flowsheets (Taken 10/4/2024 0135)  Prevent/minimize sheer/friction injuries:   Complete micro-shifts as needed if patient unable. Adjust patient position to relieve pressure points, not a full turn   Use pull sheet   HOB 30 degrees or less   Increase activity/out of bed for meals   Turn/reposition every 2 hours/use positioning/transfer devices  Goal: Promote/optimize nutrition  Outcome: Progressing  Flowsheets (Taken 10/4/2024 0135)  Promote/optimize nutrition: Offer water/supplements/favorite foods  Goal: Promote skin healing  Outcome: Progressing  Flowsheets (Taken 10/4/2024 0135)  Promote skin healing:    Turn/reposition every 2 hours/use positioning/transfer devices   Assess skin/pad under line(s)/device(s)     Problem: Fall/Injury  Goal: Not fall by end of shift  Outcome: Progressing  Goal: Be free from injury by end of the shift  Outcome: Progressing  Goal: Verbalize understanding of personal risk factors for fall in the hospital  Outcome: Progressing  Goal: Verbalize understanding of risk factor reduction measures to prevent injury from fall in the home  Outcome: Progressing  Goal: Use assistive devices by end of the shift  Outcome: Progressing  Goal: Pace activities to prevent fatigue by end of the shift  Outcome: Progressing     Problem: Pain  Goal: Takes deep breaths with improved pain control throughout the shift  Outcome: Progressing  Goal: Turns in bed with improved pain control throughout the shift  Outcome: Progressing  Goal: Walks with improved pain control throughout the shift  Outcome: Progressing  Goal: Performs ADL's with improved pain control throughout shift  Outcome: Progressing  Goal: Participates in PT with improved pain control throughout the shift  Outcome: Progressing  Goal: Free from opioid side effects throughout the shift  Outcome: Progressing  Goal: Free from acute confusion related to pain meds throughout the shift  Outcome: Progressing

## 2024-10-04 NOTE — DISCHARGE SUMMARY
DISCHARGE SUMMARY      Laura Woodward  Age:  89 y.o. female   :  1935  MRN:  [id number]    10/04/24    Date of admission:  10/2/2024     Date of discharge:  10/04/24     Attending physician at discharge:  Soraya Schneider MD     Discharge Diagnoses:  Principal Problem:  -Chest wall pain-MI ruled out  Active Problems:  -Hypomagnesemia  -Dysuria-no UTI, due to labial irritation  -Chronic urinary incontinence  -DJD of hands-low-dose Celebrex restarted     -Diffuse large B-cell lymphoma with recurrence  -Hypertension  -History of paroxysmal atrial fibrillation on chronic anticoagulation  -Anxiety/depression  -Obesity with a BMI of 31.95    Hospital Course:  Laura Woodward is a 89 y.o. 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  She presented to the ER with lower abdominal pain & worsening dysuria, along with episodes of midsternal chest pain.  In the ER she was initially hypertensive at 146/102 but blood pressures improved without treatment.  Chemistry panel unremarkable but magnesium was low at 137.  Initial troponin is negative, CBC unremarkable.  Urinalysis with 21-50 WBCs.  CT of the abdomen and pelvis with no nephric and acute findings.  EKG was sinus rhythm with nonspecific ST-T wave abnormalities which were new from her prior tracing of 3/2024.  Repeat EKG several hours later showed resolution of her lateral T wave inversions but she still had some ST-T wave abnormalities.  Patient initially denied chest pains, was given a dose of ceftriaxone and magnesium was replaced, she was going to be discharged home but then she apparently stated she was having chest pain so she was admitted to rule out MI.  Please refer to admission  H&P for further details.     Patient was admitted to telemetry and had serial enzymes which were negative.  On exam her chest pain was more right sided lower costochondral junction pain which responded well to a lidocaine patch.      Urine culture done as an outpatient the day PTA was negative so antibiotics were discontinued, by history her dysuria was more external and felt to be due to irritation from her chronic urinary incontinence.  It improved with starting Calmoseptine in the labial/perineal area.    Patient was complaining bitterly of her DJD in her hands which made it difficult for her to use her walker, previously she had been on Celebrex with good results.  After discussing with the patient and her daughter the pros and cons and possible problems with using a Miranda 2 inhibitor in the setting of her anticoagulation, she opted to restart it.  She was told to take just once daily.    By the time of discharge her symptoms had improved significantly.  She will follow-up with her PCP,  Dr. Audie King after discharge.    Procedures:  None      Problem list:  Problem List Items Addressed This Visit          Cardiac and Vasculature    * (Principal) Chest pain       Genitourinary and Reproductive    UTI (urinary tract infection) with pyuria - Primary    Relevant Medications    cephalexin (Keflex) 500 mg capsule     Other Visit Diagnoses       ECG abnormal        Relevant Orders    Referral to Cardiology    Mixed stress and urge urinary incontinence        Relevant Medications    trospium (Sanctura) 20 mg tablet    menthol-zinc oxide (Calmoseptine - Risamine) 0.44-20.6 % ointment    Musculoskeletal chest pain        Relevant Medications    lidocaine 4 % patch (Start on 10/5/2024)    Primary osteoarthritis of both hands        Relevant Medications    celecoxib (CeleBREX) 100 mg capsule             Disposition:  Home    Issues Requiring Follow-Up:  Hand DJD    Condition on Discharge:  Satisfactory    Discharge  medications:     Medication List      START taking these medications     celecoxib 100 mg capsule; Commonly known as: CeleBREX; Take 1 capsule   (100 mg) by mouth once daily.   cephalexin 500 mg capsule; Commonly known as: Keflex; Take 1 capsule   (500 mg) by mouth 4 times a day for 7 days.   lidocaine 4 % patch; Place 1 patch over 12 hours on the skin once daily.   Remove & discard patch within 12 hours or as directed by MD.; Start taking   on: October 5, 2024   menthol-zinc oxide 0.44-20.6 % ointment; Commonly known as: Calmoseptine   - Risamine; Apply 1 Application topically 4 times a day as needed   (Incontinence/irritation-apply to labial and perineal area).     CHANGE how you take these medications     trospium 20 mg tablet; Commonly known as: Sanctura; Take 1 tablet (20   mg) by mouth 2 times a day.; What changed: when to take this     CONTINUE taking these medications     acetaminophen 325 mg tablet; Commonly known as: Tylenol   amlodipine-olmesartan 10-40 mg tablet; Commonly known as: Celia; Take 1   tablet by mouth once daily.   apixaban 5 mg tablet; Commonly known as: Eliquis; Take 1 tablet (5 mg)   by mouth 2 times a day.   Yagomart Low Dose Aspirin 81 mg EC tablet; Generic drug: aspirin   cloNIDine 0.3 mg/24 hr patch; Commonly known as: Catapres-TTS; Place 1   patch on the skin 1 (one) time per week for 30 doses. Apply one patach on   the skin and replace every 7 days, as directed   docusate sodium 100 mg capsule; Commonly known as: Colace   estradiol 0.01 % (0.1 mg/gram) vaginal cream; Commonly known as:   Estrace; Insert 0.0025 Applicatorfuls (0.01 g) into the vagina once daily   at bedtime. 3 x a week   latanoprost 0.005 % ophthalmic solution; Commonly known as: Xalatan   levothyroxine 50 mcg tablet; Commonly known as: Synthroid, Levoxyl; Take   1 tablet (50 mcg) by mouth once daily.   metoprolol succinate XL 25 mg 24 hr tablet; Commonly known as:   Toprol-XL; Take 0.5 tablets (12.5 mg) by mouth once  daily. Do not crush or   chew.   nitroglycerin 0.4 mg SL tablet; Commonly known as: Nitrostat; Place 1   tablet (0.4 mg) under the tongue every 5 minutes if needed for chest pain.   May repeat dose every 5 minutes for up to 3 doses total.   omega-3 acid ethyl esters 1 gram capsule; Commonly known as: Lovaza   Probiotic Pearls Acidophilus 1 billion cell capsule,delayed   release(DR/EC); Generic drug: L.acidophilus-Bifido.longum; Take 1 capsule   by mouth once daily.   Revlimid 5 mg capsule; Generic drug: lenalidomide   spironolactone 25 mg tablet; Commonly known as: Aldactone; Take 0.5   tablets (12.5 mg) by mouth once daily.   timolol 0.5 % ophthalmic solution; Commonly known as: Timoptic   torsemide 10 mg tablet; Commonly known as: Demadex; Take 1 tablet (10   mg) by mouth once a day on Monday, Wednesday, and Friday.   traMADol 50 mg tablet; Commonly known as: Ultram; Take 1 tablet (50 mg)   by mouth every 8 hours if needed for moderate pain (4 - 6) for up to 20   doses.   valACYclovir 500 mg tablet; Commonly known as: Valtrex   vitamin E 180 mg (400 unit) capsule   ZyrTEC 10 mg capsule; Generic drug: cetirizine     STOP taking these medications     darifenacin 15 mg 24 hr tablet; Commonly known as: Enablex   ZINC-15 ORAL                                                                      Additional patient instructions on AVS:   -You were admitted with right-sided chest pain, felt to be musculoskeletal-continue using the lidocaine patch as needed.  Heart attack was ruled out.  -Your Celebrex was restarted-recommend you just use in the mornings.  If after 1-2 weeks there has been no improvement in your hand arthritis, stop it.  -When you get skin tears, use Vaseline on them and cover them with a nonstick dressing so that they can heal better.  You do not need to use antibiotic ointments which can actually cause skin reactions.  -Apply Calmoseptine to your perineal & labial area 3 times a day to help with the  "burning.  -Your low magnesium was replaced, it is still normal today.  Continue taking your normal dose of magnesium after discharge.  -The urine culture you did the day prior to admission was negative, you do not have a UTI/urinary tract infection.  You did receive several doses of antibiotics until result was found.  You do not need any further antibiotics.   -Do not take the darifenacin (Enablex) which is on your home medication list if you are using the Sanctura (which I reordered for you)-they are both similar medications.  Use 1 or the other, depending on which helps the most.    Discharge physical exam:  Vital signs:  Blood pressure 123/62, pulse 62, temperature 36.9 °C (98.4 °F), resp. rate 20, height 1.778 m (5' 10\"), weight 101 kg (222 lb 10.6 oz), SpO2 94%.   At time of discharge her chest pain was improved with the lidocaine patch, chest is clear, cardiac exam is a regular rhythm.  Please refer to progress note this date for full details.    Test Results Pending At Discharge:    Pending Labs       No current pending labs.            Code Status:  Full Code     Outpatient Follow-Up:  Future Appointments   Date Time Provider Department Center   10/18/2024  1:45 PM Danny Hsu MD [id number] [place]       Soraya Schneider MD  10/04/24        *Some of this note was completed using Dragon voice recognition technology and may include unintended errors with respect to translation of words, typographical errors or grammar errors which may not have been identified prior to finalization of the chart note.  >31 minutes patient care/medication reconciliation/discharge coordination and discussion of discharge instructions & follow-up with the patient.      "

## 2024-10-07 ENCOUNTER — PATIENT OUTREACH (OUTPATIENT)
Dept: PRIMARY CARE | Facility: CLINIC | Age: 89
End: 2024-10-07
Payer: MEDICARE

## 2024-10-07 NOTE — PROGRESS NOTES
Discharge Facility:  [facility]    Discharge Diagnosis:  -Chest wall pain-MI ruled out   -Dysuria-no UTI, due to labial irritation  -Chronic urinary incontinence  Issues Requiring Follow-Up:  Hand DJD   Admission Date:  10/2/24  Discharge Date:   10/4/24    PCP Appointment Date:  TBD-Message sent to office staff requesting assistance. As well as encourged patient;s daughter to call today to schedule. Daughter declined my offer to schedule and preferred to call herself.     Specialist Appointment Date:   10/18/24  Dr Hsu- [facility]  Hospital Encounter and Summary Linked: Yes    See discharge assessment below for further details  Engagement  Call Start Time: 0000 (spoke with daughter, Soraya) (10/7/2024  1:21 PM)    Medications  Medications reviewed with patient/caregiver?: Yes (10/7/2024  1:21 PM)  Is the patient having any side effects they believe may be caused by any medication additions or changes?: No (10/7/2024  1:21 PM)  Does the patient have all medications ordered at discharge?: Yes (10/7/2024  1:21 PM)  Prescription Comments: START taking: celecoxib (CeleBREX) cephalexin (Keflex) lidocaine Start taking on: October 5, 2024 menthol-zinc oxide (Calmoseptine - Risamine)  CHANGE how you take: trospium (Sanctura)  STOP taking: darifenacin 15 mg 24 hr tablet (Enablex) ZINC-15 ORAL (10/7/2024  1:21 PM)  Is the patient taking all medications as directed (includes completed medication regime)?: Yes (10/7/2024  1:21 PM)  Medication Comments: CM discussed referencing discharge paperwork to follow detailed daily medication schedule. Reminded to bring all medications to future appointments. Patient's daughter endorses understanding & compliance with medications. (10/7/2024  1:21 PM)    Appointments  Does the patient have a primary care provider?: Yes (10/7/2024  1:21 PM)  Care Management Interventions: Advised patient to make appointment (10/7/2024  1:21 PM)  Has the patient kept scheduled  appointments due by today?: Yes (10/7/2024  1:21 PM)  Care Management Interventions: Educated on importance of keeping appointment (reviewed up coming cardio appt) (10/7/2024  1:21 PM)    Self Management  Has home health visited the patient within 72 hours of discharge?: Not applicable (10/7/2024  1:21 PM)  What Durable Medical Equipment (DME) was ordered?: n/a (10/7/2024  1:21 PM)    Patient Teaching  Does the patient have access to their discharge instructions?: Yes (10/7/2024  1:21 PM)  Care Management Interventions: Reviewed instructions with patient (10/7/2024  1:21 PM)  What is the patient's perception of their health status since discharge?: Same (10/7/2024  1:21 PM)  Is the patient/caregiver able to teach back the hierarchy of who to call/visit for symptoms/problems? PCP, Specialist, Home Health nurse, Urgent Care, ED, 911: Yes (10/7/2024  1:21 PM)  Patient/Caregiver Education Comments: Successful transition of care outreach with patient's daughter, Soraya who is her caregiver. Reviewed hospital stay and answered any questions. Patient denies any further discharge questions/needs at this time. CM gave my contact information and encouraged to call if needing assistance or has any further non-emergent questions prior to my next outreach. (10/7/2024  1:21 PM)

## 2024-10-08 ENCOUNTER — APPOINTMENT (OUTPATIENT)
Dept: CARDIOLOGY | Facility: CLINIC | Age: 89
End: 2024-10-08
Payer: MEDICARE

## 2024-10-11 ENCOUNTER — TELEPHONE (OUTPATIENT)
Dept: PRIMARY CARE | Facility: CLINIC | Age: 89
End: 2024-10-11
Payer: MEDICARE

## 2024-10-11 DIAGNOSIS — R39.9 URINARY SYMPTOM OR SIGN: ICD-10-CM

## 2024-10-11 DIAGNOSIS — R39.9 URINARY SYMPTOM OR SIGN: Primary | ICD-10-CM

## 2024-10-11 RX ORDER — PHENAZOPYRIDINE HYDROCHLORIDE 100 MG/1
100 TABLET, FILM COATED ORAL 3 TIMES DAILY PRN
Qty: 45 TABLET | Refills: 0 | Status: SHIPPED | OUTPATIENT
Start: 2024-10-11 | End: 2024-10-11 | Stop reason: SDUPTHER

## 2024-10-11 RX ORDER — PHENAZOPYRIDINE HYDROCHLORIDE 100 MG/1
100 TABLET, FILM COATED ORAL 3 TIMES DAILY PRN
Qty: 45 TABLET | Refills: 0 | Status: SHIPPED | OUTPATIENT
Start: 2024-10-11 | End: 2024-10-26

## 2024-10-11 NOTE — TELEPHONE ENCOUNTER
Spoke with patients daughter. She was not happy with what Dr. Carnes replied with request of antibiotic. Patients daughter was advised that we are concerned with her risk of C-diff with the continuous antibiotics from hospital stay and prescription sent home. Patients daughter advised that patient is a cancer patient and is immunocompromised. Patient was advised we understand the concern however we are still concerned with the c-diff. Patients daughter was informed that the prescriptions sent today will help with the pain that patient is having when urinating and to take tylenol to help. Daughter was also advised that per Dr. Pink note patients libias are irritated and since patient is incontitnet the supplies given is going to cause further irritation. Patients daughter was informed to assist patient in bathroom make sure patient is cleaning properly and use wet wipes or wash cloths to clean thomas areas in the morning at night and after each time going to the bathroom. Daughter was also informed the use Aquaphor or Vaseline to create a water barrier to help the irritated areas keep separate from urine. Patient daughter was informed that a message will be sent over to Dr. King in regards to an antibiotic but was reminded again if patients symptoms got worse to go to the ER as to what Dr. Carnes advised. Patients daughter verbalized understanding and thanks us for the help and will try the information and prescriptions that was advised.

## 2024-10-11 NOTE — TELEPHONE ENCOUNTER
Patients daughter is requesting cephalexin, patients daughter is upset that cephalexin was not rx. Spoke to covering physician how is not comfortable with rx antibiotic due to not knowing patients history's. Advised that they can put in a referral to see a urologist and if it bad to go back to the ER. Called patient to advise message. Patients daughter refused to take patient back to the ER, Tanja DIAZ took over phone call

## 2024-10-11 NOTE — TELEPHONE ENCOUNTER
Patients daughter called office today advising that patient is still having the burning when urinating and belly pain after discharge from Hospital. Daughter was requesting an telephone appointment for patient to be seen however there is no openings in office today. I spoke with Covering physican Dr. Carens in regards to the symptoms patient is having. Per Dr. Carnes, she would like her to get on schedule with Dr. King on Monday and that she will be sending over 2 prescriptions for patient to take over the weekend in the mean time. Patient daughter also advised that the antibiotic given by Dr. Schneider in the hospital was causing diarrhea however improved when given a probiotic. Daughter was informed to keep an eye out on stools and to report any changes in stool. Daughter verbalized understanding.      For Dr. Carnes- the 2 prescriptions were sent to the mail away pharm. I repended to go to local.

## 2024-10-14 DIAGNOSIS — I50.31 ACUTE DIASTOLIC HEART FAILURE: Primary | ICD-10-CM

## 2024-10-17 ENCOUNTER — APPOINTMENT (OUTPATIENT)
Dept: PRIMARY CARE | Facility: CLINIC | Age: 89
End: 2024-10-17
Payer: MEDICARE

## 2024-10-17 ENCOUNTER — PATIENT OUTREACH (OUTPATIENT)
Dept: PRIMARY CARE | Facility: CLINIC | Age: 89
End: 2024-10-17

## 2024-10-17 DIAGNOSIS — M19.042 PRIMARY OSTEOARTHRITIS OF BOTH HANDS: ICD-10-CM

## 2024-10-17 DIAGNOSIS — I10 ESSENTIAL HYPERTENSION: ICD-10-CM

## 2024-10-17 DIAGNOSIS — Z09 HOSPITAL DISCHARGE FOLLOW-UP: Primary | ICD-10-CM

## 2024-10-17 DIAGNOSIS — M19.041 PRIMARY OSTEOARTHRITIS OF BOTH HANDS: ICD-10-CM

## 2024-10-17 DIAGNOSIS — R30.0 DYSURIA: ICD-10-CM

## 2024-10-17 DIAGNOSIS — I48.91 ATRIAL FIBRILLATION, UNSPECIFIED TYPE (MULTI): ICD-10-CM

## 2024-10-17 DIAGNOSIS — I50.31 ACUTE DIASTOLIC CONGESTIVE HEART FAILURE: ICD-10-CM

## 2024-10-17 DIAGNOSIS — I82.412 ACUTE DEEP VEIN THROMBOSIS (DVT) OF FEMORAL VEIN OF LEFT LOWER EXTREMITY (MULTI): ICD-10-CM

## 2024-10-17 PROCEDURE — 99443 PR PHYS/QHP TELEPHONE EVALUATION 21-30 MIN: CPT | Performed by: INTERNAL MEDICINE

## 2024-10-17 PROCEDURE — 1124F ACP DISCUSS-NO DSCNMKR DOCD: CPT | Performed by: INTERNAL MEDICINE

## 2024-10-17 PROCEDURE — 1160F RVW MEDS BY RX/DR IN RCRD: CPT | Performed by: INTERNAL MEDICINE

## 2024-10-17 PROCEDURE — 1159F MED LIST DOCD IN RCRD: CPT | Performed by: INTERNAL MEDICINE

## 2024-10-17 PROCEDURE — 1111F DSCHRG MED/CURRENT MED MERGE: CPT | Performed by: INTERNAL MEDICINE

## 2024-10-17 RX ORDER — CELECOXIB 100 MG/1
100 CAPSULE ORAL DAILY PRN
Qty: 30 CAPSULE | Refills: 0 | Status: SHIPPED | OUTPATIENT
Start: 2024-10-17 | End: 2024-11-16

## 2024-10-17 RX ORDER — SPIRONOLACTONE 25 MG/1
12.5 TABLET ORAL DAILY
Qty: 90 TABLET | Refills: 1 | Status: SHIPPED | OUTPATIENT
Start: 2024-10-17

## 2024-10-17 RX ORDER — CIPROFLOXACIN 500 MG/1
500 TABLET ORAL 2 TIMES DAILY
Qty: 20 TABLET | Refills: 0 | Status: SHIPPED | OUTPATIENT
Start: 2024-10-17 | End: 2024-10-27

## 2024-10-17 RX ORDER — METOPROLOL SUCCINATE 25 MG/1
12.5 TABLET, EXTENDED RELEASE ORAL DAILY
Qty: 90 TABLET | Refills: 1 | Status: SHIPPED | OUTPATIENT
Start: 2024-10-17 | End: 2025-10-17

## 2024-10-17 ASSESSMENT — PATIENT HEALTH QUESTIONNAIRE - PHQ9
SUM OF ALL RESPONSES TO PHQ9 QUESTIONS 1 AND 2: 2
10. IF YOU CHECKED OFF ANY PROBLEMS, HOW DIFFICULT HAVE THESE PROBLEMS MADE IT FOR YOU TO DO YOUR WORK, TAKE CARE OF THINGS AT HOME, OR GET ALONG WITH OTHER PEOPLE: NOT DIFFICULT AT ALL
2. FEELING DOWN, DEPRESSED OR HOPELESS: MORE THAN HALF THE DAYS
1. LITTLE INTEREST OR PLEASURE IN DOING THINGS: NOT AT ALL

## 2024-10-17 ASSESSMENT — ENCOUNTER SYMPTOMS: DEPRESSION: 0

## 2024-10-17 NOTE — PROGRESS NOTES
Unable to reach patient for call back after recent hospitalization as currently having a virtual appt with Dr King for hospital follow up . Will set next out reach .

## 2024-10-18 ENCOUNTER — APPOINTMENT (OUTPATIENT)
Dept: CARDIOLOGY | Facility: CLINIC | Age: 89
End: 2024-10-18
Payer: MEDICARE

## 2024-10-25 ASSESSMENT — ENCOUNTER SYMPTOMS
JOINT SWELLING: 0
HEMATURIA: 0
PALPITATIONS: 0
ACTIVITY CHANGE: 0
BLOOD IN STOOL: 0
LIGHT-HEADEDNESS: 0
FEVER: 0
EYE REDNESS: 0
CHEST TIGHTNESS: 0
STRIDOR: 0
SPEECH DIFFICULTY: 0

## 2024-10-25 NOTE — PROGRESS NOTES
Laura Woodward, pleasant 89 y.o. female was seen today:   Chief Complaint   Patient presents with    Hospital Follow-up     UTI and chest pains .     urinating     The patient states she has burning during urinating and after she urinates       Virtual or Telephone Consent    A telephone visit (audio only) between the patient (at the originating site) and the provider (at the distant site) was utilized to provide this telehealth service.   Verbal consent was requested and obtained from Laura Woodward on this date, 10/17/24 for a telehealth visit.       VISIT SUMMERY:    Laura Woodward Was admitted into the hospital for chest pain.  It was thought to be more of gastrointestinal hyperacidity.  She was also found to have multiple electrolytes abnormality which has been repleted.  She is too frail to come to the office for transitional care management.  So today she was on the phone conference.  She still has burning sensation.  Otherwise she is back to her normal self.  She needs medications refilled.  Home health will be working with her.  Other than that she does not have any acute medical complaint at this time.      MEDICATIONS:   Current Outpatient Medications   Medication Instructions    acetaminophen (TYLENOL) 650 mg, Every 6 hours PRN    amlodipine-olmesartan (Celia) 10-40 mg tablet 1 tablet, oral, Daily RT    apixaban (ELIQUIS) 5 mg, oral, 2 times daily    aspirin (NELDA LOW DOSE ASPIRIN) 81 mg, Daily    celecoxib (CELEBREX) 100 mg, oral, Daily PRN    Cetaphil moisturizing lotion Topical, As needed    cetirizine (ZYRTEC) 10 mg, Daily PRN    ciprofloxacin (CIPRO) 500 mg, oral, 2 times daily    cloNIDine (Catapres-TTS) 0.3 mg/24 hr patch 1 patch, transdermal, Once Weekly, Apply one patach on the skin and replace every 7 days, as directed    docusate sodium (COLACE) 100 mg, 2 times daily    estradiol (ESTRACE) 0.01 g, vaginal, Nightly, 3 x a week    latanoprost (Xalatan) 0.005 % ophthalmic solution 1  drop, Nightly    levothyroxine (SYNTHROID, LEVOXYL) 50 mcg, oral, Daily    lidocaine 4 % patch 1 patch, transdermal, Daily, Remove & discard patch within 12 hours or as directed by MD.    menthol-zinc oxide (Calmoseptine - Risamine) 0.44-20.6 % ointment 1 Application, Topical, 4 times daily PRN    metoprolol succinate XL (TOPROL-XL) 12.5 mg, oral, Daily, Do not crush or chew.    nitroglycerin (NITROSTAT) 0.4 mg, sublingual, Every 5 min PRN, May repeat dose every 5 minutes for up to 3 doses total.    phenazopyridine (PYRIDIUM) 100 mg, oral, 3 times daily PRN    Revlimid 5 mg, Daily    spironolactone (ALDACTONE) 12.5 mg, oral, Daily    timolol (Timoptic) 0.5 % ophthalmic solution 1 drop, Every 12 hours    torsemide (DEMADEX) 10 mg, oral, Every Mon/Wed/Fri    traMADol (ULTRAM) 50 mg, oral, Every 8 hours PRN    trospium (SANCTURA) 20 mg, oral, 2 times daily    vitamin E 400 Units, Daily       TODAY'S VISIT  DX:     1. Hospital discharge follow-up  Post discharge F/U; all concerns and questions were answered. Patient is now back to normal self and at baseline.      2. Dysuria  ciprofloxacin (Cipro) 500 mg tablet      3. Essential hypertension  metoprolol succinate XL (Toprol-XL) 25 mg 24 hr tablet      4. Acute diastolic congestive heart failure  spironolactone (Aldactone) 25 mg tablet      5. Atrial fibrillation, unspecified type (Multi)  apixaban (Eliquis) 5 mg tablet      6. Acute deep vein thrombosis (DVT) of femoral vein of left lower extremity (Multi)  apixaban (Eliquis) 5 mg tablet      7. Primary osteoarthritis of both hands  celecoxib (CeleBREX) 100 mg capsule           MEDICAL DECISION MAKING:  - Treatment and therapy plan are as above   - Active medical co morbidities have been considered.   - Recent lab work and relevant imaging studies were reviewed.    - Correspondence/notes from specialty consultants were checked.    - Next Follow up in 3 months      Review of Systems   Constitutional:  Negative for  activity change and fever.   HENT:  Negative for hearing loss, nosebleeds and tinnitus.    Eyes:  Negative for redness.   Respiratory:  Negative for chest tightness and stridor.    Cardiovascular:  Negative for chest pain, palpitations and leg swelling.   Gastrointestinal:  Negative for blood in stool.   Endocrine: Negative for cold intolerance.   Genitourinary:  Negative for hematuria.   Musculoskeletal:  Negative for joint swelling.   Skin:  Negative for rash.   Neurological:  Negative for speech difficulty and light-headedness.   Psychiatric/Behavioral:  Negative for behavioral problems.      Visit Vitals  LMP  (LMP Unknown)   OB Status Unknown   Smoking Status Former         Wt Readings from Last 10 Encounters:   10/02/24 101 kg (222 lb 10.6 oz)   03/21/24 95 kg (209 lb 6.4 oz)   03/05/24 96.2 kg (212 lb)   02/26/24 104 kg (230 lb 6.1 oz)   01/12/24 104 kg (230 lb)   10/19/23 104 kg (229 lb 4.5 oz)   08/30/23 104 kg (230 lb)   06/23/22 106 kg (233 lb)   04/19/22 106 kg (233 lb)   09/03/21 113 kg (249 lb 9 oz)     Physical Exam  Constitutional:       General: She is not in acute distress.  HENT:      Nose: No rhinorrhea.   Pulmonary:      Effort: Pulmonary effort is normal.      Breath sounds: No stridor. No wheezing.   Neurological:      Mental Status: She is alert and oriented to person, place, and time.   Psychiatric:         Thought Content: Thought content normal.        RECENT LABS:  Lab Results   Component Value Date    WBC 6.7 10/04/2024    HGB 11.8 (L) 10/04/2024    HCT 36.3 10/04/2024     10/04/2024    CHOL 160 12/08/2022    TRIG 133 12/08/2022    HDL 51.7 12/08/2022    ALT 6 (L) 10/02/2024    AST 11 10/02/2024     10/04/2024    K 3.7 10/04/2024     10/04/2024    CREATININE 0.95 10/04/2024    BUN 12 10/04/2024    CO2 24 10/04/2024    TSH 1.26 02/26/2024    INR 2.1 (H) 02/26/2024     Lab Results   Component Value Date    GLUCOSE 100 (H) 10/04/2024    CALCIUM 9.0 10/04/2024      10/04/2024    K 3.7 10/04/2024    CO2 24 10/04/2024     10/04/2024    BUN 12 10/04/2024    CREATININE 0.95 10/04/2024      Lab Results   Component Value Date    CREATININE 0.95 10/04/2024             P.S: This note was completed using Dragon voice recognition technology and may include unintended errors with respect to translation of words, typographical errors or grammar errors which may not have been identified while finalizing the chart.

## 2024-11-07 ENCOUNTER — PATIENT OUTREACH (OUTPATIENT)
Dept: PRIMARY CARE | Facility: CLINIC | Age: 89
End: 2024-11-07
Payer: MEDICARE

## 2024-11-07 ENCOUNTER — TELEPHONE (OUTPATIENT)
Dept: PRIMARY CARE | Facility: CLINIC | Age: 89
End: 2024-11-07
Payer: MEDICARE

## 2024-11-07 DIAGNOSIS — I10 ESSENTIAL HYPERTENSION: ICD-10-CM

## 2024-11-07 RX ORDER — CLONIDINE 0.3 MG/24H
1 PATCH, EXTENDED RELEASE TRANSDERMAL
Qty: 12 PATCH | Refills: 3 | Status: SHIPPED | OUTPATIENT
Start: 2024-11-10 | End: 2025-06-02

## 2024-11-07 NOTE — PROGRESS NOTES
I reached out to daughter for a TCM outreach call. Daughter mentioned that mom's DJD (hands especially) has been extremely painful since being taken down from 200mg twice a day to 100mg daily prn on 10/2/24. Patient and daughter are both very concerns with safety when trying to use walker or grab bars to get in and out of bathtub due to pain she can not hold on well.   Patient asks for Celebrex every day due to pain but daughter was not sure that is okay so only been giving it to her once a week at 100mg.   Requesting if there is anything else that she can try to help with the arthritis that will not be an issue with the Eliquis or is it okay for her to take the 100mg every morning?     Patient has follow up with Dr Hsu coming up on 11/22/24. Had to cancel 10/18/24 hospital follow up due to diarrhea from antibiotics she was on. Planning to keep the 11/22/24 appt but hoping to find something to help her in the meantime.     Please call daughter Soraya back [phone] with advise.   Thank you ,   Uzma VALDES

## 2024-11-07 NOTE — PROGRESS NOTES
Successful outreach to patient regarding hospitalization as patient continues TCM program.   At time of outreach call the patient feels as if their condition has improved since initial visit with PCP or specialist but still having issues with Diarrhea, Working with the doctors.   Questions or concerns addressed at this time with patient.   Provided contact information to patient if any further non-emergent needs arise.      Pharmacy Referral was placed but daughter had not called back to them yet. Daughter is going to call pharmacy number back for review.

## 2024-11-13 DIAGNOSIS — N39.46 MIXED STRESS AND URGE URINARY INCONTINENCE: ICD-10-CM

## 2024-11-13 DIAGNOSIS — I50.31 ACUTE DIASTOLIC CONGESTIVE HEART FAILURE: ICD-10-CM

## 2024-11-13 DIAGNOSIS — I10 ESSENTIAL HYPERTENSION: ICD-10-CM

## 2024-11-13 RX ORDER — TROSPIUM CHLORIDE 20 MG/1
10 TABLET, FILM COATED ORAL 2 TIMES DAILY
Qty: 90 TABLET | Refills: 1 | Status: SHIPPED | OUTPATIENT
Start: 2024-11-13 | End: 2025-11-13

## 2024-11-13 RX ORDER — AMLODIPINE AND OLMESARTAN MEDOXOMIL 10; 40 MG/1; MG/1
1 TABLET ORAL
Qty: 90 TABLET | Refills: 1 | Status: SHIPPED | OUTPATIENT
Start: 2024-11-13

## 2024-11-13 RX ORDER — SPIRONOLACTONE 25 MG/1
12.5 TABLET ORAL DAILY
Qty: 90 TABLET | Refills: 1 | Status: SHIPPED | OUTPATIENT
Start: 2024-11-13

## 2024-11-13 NOTE — TELEPHONE ENCOUNTER
Patients daughter called the office requesting medication refills for the patient. Rxs pended    Last OV: 10/17/2024    Pending OV: None

## 2024-11-14 DIAGNOSIS — E03.9 HYPOTHYROIDISM, UNSPECIFIED TYPE: ICD-10-CM

## 2024-11-14 RX ORDER — LEVOTHYROXINE SODIUM 50 UG/1
50 TABLET ORAL DAILY
Qty: 90 TABLET | Refills: 1 | Status: SHIPPED | OUTPATIENT
Start: 2024-11-14

## 2024-11-22 ENCOUNTER — APPOINTMENT (OUTPATIENT)
Dept: CARDIOLOGY | Facility: CLINIC | Age: 89
End: 2024-11-22
Payer: MEDICARE

## 2024-12-10 ENCOUNTER — TELEPHONE (OUTPATIENT)
Dept: PRIMARY CARE | Facility: CLINIC | Age: 89
End: 2024-12-10
Payer: MEDICARE

## 2024-12-10 NOTE — TELEPHONE ENCOUNTER
Last OV: 10-2-2024  Pending OV: None    C/O urinating every 3 hrs, now  every 2 hours.. Onset: 1 week ago or so.    Spoke with daughter to take Pt to ER or schedule appt ASAP.    Transferred call to front office.

## 2024-12-11 ENCOUNTER — HOSPITAL ENCOUNTER (OUTPATIENT)
Age: 89
Setting detail: SPECIMEN
Discharge: HOME | End: 2024-12-11
Payer: MEDICARE

## 2024-12-11 ENCOUNTER — OFFICE VISIT (OUTPATIENT)
Dept: FAMILY MEDICINE CLINIC | Age: 89
End: 2024-12-11
Payer: MEDICARE

## 2024-12-11 VITALS — HEART RATE: 71 BPM | OXYGEN SATURATION: 98 % | WEIGHT: 227.96 LBS | BODY MASS INDEX: 32.63 KG/M2 | HEIGHT: 70 IN

## 2024-12-11 DIAGNOSIS — N39.0 RECURRENT UTI: ICD-10-CM

## 2024-12-11 DIAGNOSIS — N39.0 RECURRENT UTI: Primary | ICD-10-CM

## 2024-12-11 LAB
BILIRUBIN, POC: NORMAL
BLOOD URINE, POC: NORMAL
CLARITY, POC: NORMAL
COLOR, POC: YELLOW
GLUCOSE URINE, POC: NORMAL MG/DL
KETONES, POC: NORMAL MG/DL
LEUKOCYTE EST, POC: NORMAL
NITRITE, POC: NORMAL
PH, POC: 6
PROTEIN, POC: NORMAL MG/DL
SPECIFIC GRAVITY, POC: 1.01
UROBILINOGEN, POC: NORMAL MG/DL

## 2024-12-11 PROCEDURE — 1159F MED LIST DOCD IN RCRD: CPT | Performed by: NURSE PRACTITIONER

## 2024-12-11 PROCEDURE — G8484 FLU IMMUNIZE NO ADMIN: HCPCS | Performed by: NURSE PRACTITIONER

## 2024-12-11 PROCEDURE — 99214 OFFICE O/P EST MOD 30 MIN: CPT | Performed by: NURSE PRACTITIONER

## 2024-12-11 PROCEDURE — 1123F ACP DISCUSS/DSCN MKR DOCD: CPT | Performed by: NURSE PRACTITIONER

## 2024-12-11 PROCEDURE — 1090F PRES/ABSN URINE INCON ASSESS: CPT | Performed by: NURSE PRACTITIONER

## 2024-12-11 PROCEDURE — 87086 URINE CULTURE/COLONY COUNT: CPT

## 2024-12-11 PROCEDURE — G8417 CALC BMI ABV UP PARAM F/U: HCPCS | Performed by: NURSE PRACTITIONER

## 2024-12-11 PROCEDURE — G8427 DOCREV CUR MEDS BY ELIG CLIN: HCPCS | Performed by: NURSE PRACTITIONER

## 2024-12-11 PROCEDURE — 1036F TOBACCO NON-USER: CPT | Performed by: NURSE PRACTITIONER

## 2024-12-11 PROCEDURE — 1160F RVW MEDS BY RX/DR IN RCRD: CPT | Performed by: NURSE PRACTITIONER

## 2024-12-11 PROCEDURE — 81003 URINALYSIS AUTO W/O SCOPE: CPT | Performed by: NURSE PRACTITIONER

## 2024-12-11 RX ORDER — CIPROFLOXACIN 500 MG/1
500 TABLET, FILM COATED ORAL 2 TIMES DAILY
Qty: 20 TABLET | Refills: 0 | Status: SHIPPED | OUTPATIENT
Start: 2024-12-11 | End: 2024-12-21

## 2024-12-11 SDOH — ECONOMIC STABILITY: FOOD INSECURITY: WITHIN THE PAST 12 MONTHS, THE FOOD YOU BOUGHT JUST DIDN'T LAST AND YOU DIDN'T HAVE MONEY TO GET MORE.: NEVER TRUE

## 2024-12-11 SDOH — ECONOMIC STABILITY: FOOD INSECURITY: WITHIN THE PAST 12 MONTHS, YOU WORRIED THAT YOUR FOOD WOULD RUN OUT BEFORE YOU GOT MONEY TO BUY MORE.: NEVER TRUE

## 2024-12-11 SDOH — ECONOMIC STABILITY: INCOME INSECURITY: HOW HARD IS IT FOR YOU TO PAY FOR THE VERY BASICS LIKE FOOD, HOUSING, MEDICAL CARE, AND HEATING?: NOT HARD AT ALL

## 2024-12-11 ASSESSMENT — PATIENT HEALTH QUESTIONNAIRE - PHQ9
SUM OF ALL RESPONSES TO PHQ9 QUESTIONS 1 & 2: 0
2. FEELING DOWN, DEPRESSED OR HOPELESS: NOT AT ALL
SUM OF ALL RESPONSES TO PHQ QUESTIONS 1-9: 0
SUM OF ALL RESPONSES TO PHQ QUESTIONS 1-9: 0
1. LITTLE INTEREST OR PLEASURE IN DOING THINGS: NOT AT ALL
SUM OF ALL RESPONSES TO PHQ QUESTIONS 1-9: 0
SUM OF ALL RESPONSES TO PHQ QUESTIONS 1-9: 0

## 2024-12-11 NOTE — PATIENT INSTRUCTIONS

## 2024-12-12 ENCOUNTER — TELEPHONE (OUTPATIENT)
Dept: PRIMARY CARE | Facility: CLINIC | Age: 89
End: 2024-12-12
Payer: MEDICARE

## 2024-12-12 NOTE — TELEPHONE ENCOUNTER
Call to Pt (Daughter) to follow-up.     Daughter reports took Pt to [facility] ER, wait was going to be too long decided to go to [facility] nearby. UA results blood in Urine and Protein. Reports Ciprofloxin was ordered.    Pt has taken 2nd dose of Antibiotic.

## 2024-12-13 ENCOUNTER — PATIENT OUTREACH (OUTPATIENT)
Dept: PRIMARY CARE | Facility: CLINIC | Age: 89
End: 2024-12-13
Payer: MEDICARE

## 2024-12-13 NOTE — PROGRESS NOTES
Final call back to assess needs post discharge. Left voicemail for patient. Noted that PCP office is working on getting update from daughter about appt or taking pt to ER. I let patient know and asked her to give Dr King's office a call and they can pull up notes.  Contact information provided for myself If she is having issues getting in touch.

## 2024-12-14 LAB
BACTERIA UR CULT: ABNORMAL
BACTERIA UR CULT: ABNORMAL
ORGANISM: ABNORMAL

## 2024-12-16 RX ORDER — NITROFURANTOIN 25; 75 MG/1; MG/1
100 CAPSULE ORAL 2 TIMES DAILY
Qty: 14 CAPSULE | Refills: 0 | Status: SHIPPED | OUTPATIENT
Start: 2024-12-16 | End: 2024-12-23

## 2024-12-17 ENCOUNTER — TELEPHONE (OUTPATIENT)
Dept: PRIMARY CARE | Facility: CLINIC | Age: 89
End: 2024-12-17
Payer: MEDICARE

## 2024-12-17 DIAGNOSIS — Z98.890 HISTORY OF NECK SURGERY: ICD-10-CM

## 2024-12-17 DIAGNOSIS — M81.0 OSTEOPOROSIS, UNSPECIFIED OSTEOPOROSIS TYPE, UNSPECIFIED PATHOLOGICAL FRACTURE PRESENCE: ICD-10-CM

## 2024-12-17 NOTE — TELEPHONE ENCOUNTER
The patient daughter call the  office stating that her mother has been having neck pain. He daughter sates that the patient has had several surgeries on her neck. They are requesting a referral to Orth someone who specializes in spinal cord surgeries, I have pended the order to be signed if approved

## 2024-12-26 DIAGNOSIS — R52 GENERALIZED PAIN: ICD-10-CM

## 2024-12-26 RX ORDER — TRAMADOL HYDROCHLORIDE 50 MG/1
50 TABLET ORAL EVERY 8 HOURS PRN
Qty: 10 TABLET | Refills: 0 | Status: CANCELLED | OUTPATIENT
Start: 2024-12-26

## 2024-12-26 NOTE — TELEPHONE ENCOUNTER
Patients daughter called the office today requesting for patients Tramadol 50 mg to be refilled (originally Rx by Dr. Schneider on 01/14/2024). Okay to refill med?    Last OV: 10/17/2024    Pending OV: None

## 2024-12-30 NOTE — TELEPHONE ENCOUNTER
Private message was sent to Dr. Schneider in regards to the patients daughter request.     Patients daughter would like an appointment-  please call patient to set up appointment.

## 2024-12-30 NOTE — TELEPHONE ENCOUNTER
Patient daughter calling for a refill on her Celebrex 100mg. Patient does not have the medication on active medication list.     Daughter is also requesting a call and to speak to Dr. Schneider about incidents that occurred in the hospital at last stay.

## 2024-12-31 ENCOUNTER — TELEMEDICINE (OUTPATIENT)
Dept: PRIMARY CARE | Facility: CLINIC | Age: 89
End: 2024-12-31
Payer: MEDICARE

## 2024-12-31 ENCOUNTER — TELEPHONE (OUTPATIENT)
Dept: PRIMARY CARE | Facility: CLINIC | Age: 89
End: 2024-12-31

## 2024-12-31 DIAGNOSIS — R52 GENERALIZED PAIN: ICD-10-CM

## 2024-12-31 DIAGNOSIS — E87.70 HYPERVOLEMIA, UNSPECIFIED HYPERVOLEMIA TYPE: ICD-10-CM

## 2024-12-31 DIAGNOSIS — I89.0 LYMPHEDEMA OF LEFT LOWER EXTREMITY: ICD-10-CM

## 2024-12-31 DIAGNOSIS — C83.33 DIFFUSE LARGE B-CELL LYMPHOMA OF INTRA-ABDOMINAL LYMPH NODES (MULTI): Primary | ICD-10-CM

## 2024-12-31 DIAGNOSIS — N18.31 STAGE 3A CHRONIC KIDNEY DISEASE (CKD) (MULTI): ICD-10-CM

## 2024-12-31 DIAGNOSIS — I10 ESSENTIAL HYPERTENSION: ICD-10-CM

## 2024-12-31 DIAGNOSIS — E03.9 HYPOTHYROIDISM, UNSPECIFIED TYPE: ICD-10-CM

## 2024-12-31 PROCEDURE — 1158F ADVNC CARE PLAN TLK DOCD: CPT | Performed by: FAMILY MEDICINE

## 2024-12-31 PROCEDURE — 1123F ACP DISCUSS/DSCN MKR DOCD: CPT | Performed by: FAMILY MEDICINE

## 2024-12-31 PROCEDURE — 99442 PR PHYS/QHP TELEPHONE EVALUATION 11-20 MIN: CPT | Performed by: FAMILY MEDICINE

## 2024-12-31 PROCEDURE — 1159F MED LIST DOCD IN RCRD: CPT | Performed by: FAMILY MEDICINE

## 2024-12-31 RX ORDER — TRAMADOL HYDROCHLORIDE 50 MG/1
50 TABLET ORAL EVERY 8 HOURS PRN
Qty: 30 TABLET | Refills: 0 | Status: SHIPPED | OUTPATIENT
Start: 2024-12-31

## 2024-12-31 RX ORDER — FUROSEMIDE 20 MG/1
20 TABLET ORAL DAILY
Qty: 5 TABLET | Refills: 0 | Status: SHIPPED | OUTPATIENT
Start: 2024-12-31 | End: 2025-01-05

## 2024-12-31 NOTE — PROGRESS NOTES
Subjective   Chief complaint: Laura Woodward is a 89 y.o. female who presents for Leg Swelling (For over a month, getting worse no ROM in toes patient can't feel toes. ), Arm Swelling, and Med Refill (Requesting tramadol refills ).    HPI:  HPI    Virtual visit.  Phone call.  Spoke with the patient and her daughter.  Complains of swelling.  Bilateral extremities.  Upper and lower.  No fevers chills.  No increased shortness of breath dyspnea on exertion.  No chest pain.  No nausea vomiting.  No fevers chills.  Has been about a week.  Reviewed her medical records.  Reviewed her recent medical visits for urinary tract infections.  She is on Demadex 3 times daily.  Also complains of pain.  Getting worse.  She is romeo have follow-up evaluation for her orthopedic evaluation for her neck.  For now we will refill tramadol.  Short course of Lasix.  Continue Demadex.  Follow-up primary care physician office for further evaluation Go to emergency department if it worsens in the interim.  Objective   There were no vitals taken for this visit.  Physical Exam  Review of Systems   I have reviewed and reconciled the medication list with the patient today.   Current Outpatient Medications:     amlodipine-olmesartan (Celia) 10-40 mg tablet, Take 1 tablet by mouth once daily., Disp: 90 tablet, Rfl: 1    apixaban (Eliquis) 5 mg tablet, Take 1 tablet (5 mg) by mouth 2 times a day., Disp: 180 tablet, Rfl: 1    aspirin (Beulah Low Dose Aspirin) 81 mg EC tablet, Take 1 tablet (81 mg) by mouth once daily., Disp: , Rfl:     Cetaphil moisturizing lotion, Apply topically if needed for dry skin., Disp: 400 mL, Rfl: 0    cetirizine (ZyrTEC) 10 mg capsule, 1 capsule (10 mg) once daily as needed., Disp: , Rfl:     cloNIDine (Catapres-TTS) 0.3 mg/24 hr patch, Place 1 patch on the skin 1 (one) time per week for 30 doses. Apply one patach on the skin and replace every 7 days, as directed, Disp: 12 patch, Rfl: 3    docusate sodium (Colace) 100 mg  capsule, Take 1 capsule (100 mg) by mouth 2 times a day., Disp: , Rfl:     estradiol (Estrace) 0.01 % (0.1 mg/gram) vaginal cream, Insert 0.0025 Applicatorfuls (0.01 g) into the vagina once daily at bedtime. 3 x a week (Patient taking differently: Insert 0.0025 Applicatorfuls (0.01 g) into the vagina once daily at bedtime. 2x a week), Disp: 42.5 g, Rfl: 1    latanoprost (Xalatan) 0.005 % ophthalmic solution, Administer 1 drop into both eyes once daily at bedtime., Disp: , Rfl:     levothyroxine (Synthroid, Levoxyl) 50 mcg tablet, Take 1 tablet (50 mcg) by mouth once daily., Disp: 90 tablet, Rfl: 1    lidocaine 4 % patch, Place 1 patch over 12 hours on the skin once daily. Remove & discard patch within 12 hours or as directed by MD., Disp: 28 patch, Rfl: 0    menthol-zinc oxide (Calmoseptine - Risamine) 0.44-20.6 % ointment, Apply 1 Application topically 4 times a day as needed (Incontinence/irritation-apply to labial and perineal area)., Disp: 20 g, Rfl: 1    metoprolol succinate XL (Toprol-XL) 25 mg 24 hr tablet, Take 0.5 tablets (12.5 mg) by mouth once daily. Do not crush or chew., Disp: 90 tablet, Rfl: 1    nitroglycerin (Nitrostat) 0.4 mg SL tablet, Place 1 tablet (0.4 mg) under the tongue every 5 minutes if needed for chest pain. May repeat dose every 5 minutes for up to 3 doses total., Disp: 25 tablet, Rfl: 5    Revlimid 5 mg capsule, Take 1 capsule (5 mg) by mouth once daily. As directed per oncology, Disp: , Rfl:     spironolactone (Aldactone) 25 mg tablet, Take 0.5 tablets (12.5 mg) by mouth once daily., Disp: 90 tablet, Rfl: 1    timolol (Timoptic) 0.5 % ophthalmic solution, Administer 1 drop into affected eye(s) every 12 hours., Disp: , Rfl:     torsemide (Demadex) 10 mg tablet, Take 1 tablet (10 mg) by mouth once a day on Monday, Wednesday, and Friday., Disp: 36 tablet, Rfl: 3    trospium (Sanctura) 20 mg tablet, Take 0.5 tablets (10 mg) by mouth 2 times a day., Disp: 90 tablet, Rfl: 1    acetaminophen  (Tylenol) 325 mg tablet, Take 2 tablets (650 mg) by mouth every 6 hours if needed. (Patient not taking: Reported on 12/31/2024), Disp: , Rfl:     furosemide (Lasix) 20 mg tablet, Take 1 tablet (20 mg) by mouth once daily for 5 days., Disp: 5 tablet, Rfl: 0    traMADol (Ultram) 50 mg tablet, Take 1 tablet (50 mg) by mouth every 8 hours if needed for moderate pain (4 - 6) for up to 30 doses., Disp: 30 tablet, Rfl: 0     Imaging:  No results found.     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

## 2024-12-31 NOTE — TELEPHONE ENCOUNTER
Message left for daughter requesting a return call - in response to 2 voicemails left 12/30/2024 requesting a return call (when office was closed due to water break)

## 2025-01-03 DIAGNOSIS — M81.0 AGE RELATED OSTEOPOROSIS, UNSPECIFIED PATHOLOGICAL FRACTURE PRESENCE: ICD-10-CM

## 2025-01-05 RX ORDER — ESTRADIOL 0.1 MG/G
0.01 CREAM VAGINAL NIGHTLY
Qty: 42.5 G | Refills: 1 | Status: SHIPPED | OUTPATIENT
Start: 2025-01-05

## 2025-01-09 ENCOUNTER — APPOINTMENT (OUTPATIENT)
Dept: ORTHOPEDIC SURGERY | Facility: CLINIC | Age: 89
End: 2025-01-09
Payer: MEDICARE

## 2025-01-21 ENCOUNTER — APPOINTMENT (OUTPATIENT)
Dept: PRIMARY CARE | Facility: CLINIC | Age: 89
End: 2025-01-21
Payer: MEDICARE

## 2025-01-22 ENCOUNTER — TELEPHONE (OUTPATIENT)
Dept: PRIMARY CARE | Facility: CLINIC | Age: 89
End: 2025-01-22

## 2025-01-22 ENCOUNTER — TELEMEDICINE (OUTPATIENT)
Dept: PRIMARY CARE | Facility: CLINIC | Age: 89
End: 2025-01-22
Payer: MEDICARE

## 2025-01-22 DIAGNOSIS — N18.32 CHRONIC KIDNEY DISEASE, STAGE 3B (MULTI): ICD-10-CM

## 2025-01-22 DIAGNOSIS — I50.33 ACUTE ON CHRONIC DIASTOLIC (CONGESTIVE) HEART FAILURE: ICD-10-CM

## 2025-01-22 DIAGNOSIS — C77.2 SECONDARY AND UNSPECIFIED MALIGNANT NEOPLASM OF INTRA-ABDOMINAL LYMPH NODES (MULTI): Primary | ICD-10-CM

## 2025-01-22 DIAGNOSIS — E66.01 MORBID (SEVERE) OBESITY DUE TO EXCESS CALORIES (MULTI): ICD-10-CM

## 2025-01-22 DIAGNOSIS — M16.12 ARTHRITIS OF LEFT HIP: Primary | ICD-10-CM

## 2025-01-22 DIAGNOSIS — I48.92 ATRIAL FLUTTER, UNSPECIFIED TYPE (MULTI): ICD-10-CM

## 2025-01-22 PROCEDURE — G2211 COMPLEX E/M VISIT ADD ON: HCPCS | Performed by: INTERNAL MEDICINE

## 2025-01-22 PROCEDURE — 1159F MED LIST DOCD IN RCRD: CPT | Performed by: INTERNAL MEDICINE

## 2025-01-22 PROCEDURE — 1123F ACP DISCUSS/DSCN MKR DOCD: CPT | Performed by: INTERNAL MEDICINE

## 2025-01-22 PROCEDURE — 99214 OFFICE O/P EST MOD 30 MIN: CPT | Performed by: INTERNAL MEDICINE

## 2025-01-22 RX ORDER — CELECOXIB 100 MG/1
100 CAPSULE ORAL DAILY
Qty: 30 CAPSULE | Refills: 2 | Status: SHIPPED | OUTPATIENT
Start: 2025-01-22 | End: 2026-01-22

## 2025-01-22 RX ORDER — TORSEMIDE 100 MG/1
100 TABLET ORAL DAILY
Qty: 30 TABLET | Refills: 11 | Status: SHIPPED | OUTPATIENT
Start: 2025-01-22 | End: 2026-01-22

## 2025-01-22 RX ORDER — CELECOXIB 100 MG/1
CAPSULE ORAL
COMMUNITY
Start: 2024-12-06 | End: 2025-01-22 | Stop reason: SDUPTHER

## 2025-01-24 ENCOUNTER — TELEPHONE (OUTPATIENT)
Dept: PRIMARY CARE | Facility: CLINIC | Age: 89
End: 2025-01-24
Payer: MEDICARE

## 2025-01-24 DIAGNOSIS — I50.33 ACUTE ON CHRONIC DIASTOLIC (CONGESTIVE) HEART FAILURE: Primary | ICD-10-CM

## 2025-01-24 NOTE — TELEPHONE ENCOUNTER
Daughter called for a renewal on handicap placard. She advises that it has to be an original prescription with a hand signature or [facility] will not accept. Patients daughter is requesting it to be mailed to address on chart.

## 2025-02-12 ENCOUNTER — TELEPHONE (OUTPATIENT)
Dept: PRIMARY CARE | Facility: CLINIC | Age: 89
End: 2025-02-12
Payer: MEDICARE

## 2025-02-12 DIAGNOSIS — N30.00 ACUTE CYSTITIS WITHOUT HEMATURIA: Primary | ICD-10-CM

## 2025-02-13 RX ORDER — NITROFURANTOIN 25; 75 MG/1; MG/1
100 CAPSULE ORAL 2 TIMES DAILY
Qty: 14 CAPSULE | Refills: 0 | Status: SHIPPED | OUTPATIENT
Start: 2025-02-13 | End: 2025-02-14 | Stop reason: SDUPTHER

## 2025-02-15 RX ORDER — NITROFURANTOIN 25; 75 MG/1; MG/1
100 CAPSULE ORAL 2 TIMES DAILY
Qty: 14 CAPSULE | Refills: 0 | Status: SHIPPED | OUTPATIENT
Start: 2025-02-15 | End: 2025-02-22

## 2025-02-24 ENCOUNTER — TELEPHONE (OUTPATIENT)
Dept: PRIMARY CARE | Facility: CLINIC | Age: 89
End: 2025-02-24
Payer: MEDICARE

## 2025-02-24 NOTE — TELEPHONE ENCOUNTER
Patients daughter Soraya calling to state that the patient has completed the round of antibiotics but is still experirencing some burning when using the bathroom.  Patient daughter is asking what the next could be to help with symptoms.  Please advise

## 2025-02-25 NOTE — TELEPHONE ENCOUNTER
Spoke with patient daughter Soraya. Reviewed Dr. King message. Soraya states she will speak with patient and decide if taking to ER for eval or call office back to schedule in office appointment.

## 2025-02-26 ENCOUNTER — TELEPHONE (OUTPATIENT)
Dept: PRIMARY CARE | Facility: CLINIC | Age: 89
End: 2025-02-26
Payer: MEDICARE

## 2025-02-26 NOTE — TELEPHONE ENCOUNTER
Last OV: 3-, Telemedicine visit   Pending OV: NONE    Pt daughter (Soraya) called to request blood work and Urine sample at [facility]. Willing to come office to  orders.     Call to Pt daughter (Soraya) to inform office received message.    Would like to have labs completed then come to office. Not thrilled to go to ER. Last time 12 hrs in ER then finally admitted. As of this moment no appointment scheduled. Please advise.    Please schedule office visit. Thank You!

## 2025-02-28 ENCOUNTER — APPOINTMENT (OUTPATIENT)
Dept: CARDIOLOGY | Facility: HOSPITAL | Age: 89
End: 2025-02-28
Payer: MEDICARE

## 2025-02-28 ENCOUNTER — APPOINTMENT (OUTPATIENT)
Dept: RADIOLOGY | Facility: HOSPITAL | Age: 89
End: 2025-02-28
Payer: MEDICARE

## 2025-02-28 ENCOUNTER — HOSPITAL ENCOUNTER (EMERGENCY)
Facility: HOSPITAL | Age: 89
Discharge: HOME | End: 2025-02-28
Payer: MEDICARE

## 2025-02-28 VITALS
RESPIRATION RATE: 18 BRPM | HEART RATE: 75 BPM | SYSTOLIC BLOOD PRESSURE: 149 MMHG | HEIGHT: 70 IN | WEIGHT: 240 LBS | TEMPERATURE: 98.1 F | DIASTOLIC BLOOD PRESSURE: 71 MMHG | OXYGEN SATURATION: 94 % | BODY MASS INDEX: 34.36 KG/M2

## 2025-02-28 DIAGNOSIS — N39.46 MIXED STRESS AND URGE URINARY INCONTINENCE: ICD-10-CM

## 2025-02-28 DIAGNOSIS — K59.00 CONSTIPATION, UNSPECIFIED CONSTIPATION TYPE: ICD-10-CM

## 2025-02-28 DIAGNOSIS — R30.0 DYSURIA: Primary | ICD-10-CM

## 2025-02-28 LAB
ALBUMIN SERPL BCP-MCNC: 3.4 G/DL (ref 3.4–5)
ALP SERPL-CCNC: 78 U/L (ref 33–136)
ALT SERPL W P-5'-P-CCNC: 12 U/L (ref 7–45)
ANION GAP SERPL CALC-SCNC: 12 MMOL/L (ref 10–20)
APPEARANCE UR: CLEAR
AST SERPL W P-5'-P-CCNC: 14 U/L (ref 9–39)
ATRIAL RATE: 78 BPM
BASOPHILS # BLD AUTO: 0.05 X10*3/UL (ref 0–0.1)
BASOPHILS NFR BLD AUTO: 1.3 %
BILIRUB SERPL-MCNC: 0.3 MG/DL (ref 0–1.2)
BILIRUB UR STRIP.AUTO-MCNC: NEGATIVE MG/DL
BNP SERPL-MCNC: 125 PG/ML (ref 0–99)
BUN SERPL-MCNC: 24 MG/DL (ref 6–23)
CALCIUM SERPL-MCNC: 7.6 MG/DL (ref 8.6–10.3)
CARDIAC TROPONIN I PNL SERPL HS: 5 NG/L (ref 0–13)
CARDIAC TROPONIN I PNL SERPL HS: 5 NG/L (ref 0–13)
CHLORIDE SERPL-SCNC: 117 MMOL/L (ref 98–107)
CO2 SERPL-SCNC: 19 MMOL/L (ref 21–32)
COLOR UR: NORMAL
CREAT SERPL-MCNC: 0.96 MG/DL (ref 0.5–1.05)
EGFRCR SERPLBLD CKD-EPI 2021: 57 ML/MIN/1.73M*2
EOSINOPHIL # BLD AUTO: 0.13 X10*3/UL (ref 0–0.4)
EOSINOPHIL NFR BLD AUTO: 3.3 %
ERYTHROCYTE [DISTWIDTH] IN BLOOD BY AUTOMATED COUNT: 12.9 % (ref 11.5–14.5)
GLUCOSE SERPL-MCNC: 86 MG/DL (ref 74–99)
GLUCOSE UR STRIP.AUTO-MCNC: NORMAL MG/DL
HCT VFR BLD AUTO: 34.5 % (ref 36–46)
HGB BLD-MCNC: 11.1 G/DL (ref 12–16)
IMM GRANULOCYTES # BLD AUTO: 0.01 X10*3/UL (ref 0–0.5)
IMM GRANULOCYTES NFR BLD AUTO: 0.3 % (ref 0–0.9)
INR PPP: 1.2 (ref 0.9–1.1)
KETONES UR STRIP.AUTO-MCNC: NEGATIVE MG/DL
LACTATE SERPL-SCNC: 0.5 MMOL/L (ref 0.4–2)
LEUKOCYTE ESTERASE UR QL STRIP.AUTO: NEGATIVE
LIPASE SERPL-CCNC: 24 U/L (ref 9–82)
LYMPHOCYTES # BLD AUTO: 1.56 X10*3/UL (ref 0.8–3)
LYMPHOCYTES NFR BLD AUTO: 39.3 %
MCH RBC QN AUTO: 34.6 PG (ref 26–34)
MCHC RBC AUTO-ENTMCNC: 32.2 G/DL (ref 32–36)
MCV RBC AUTO: 108 FL (ref 80–100)
MONOCYTES # BLD AUTO: 0.47 X10*3/UL (ref 0.05–0.8)
MONOCYTES NFR BLD AUTO: 11.8 %
NEUTROPHILS # BLD AUTO: 1.75 X10*3/UL (ref 1.6–5.5)
NEUTROPHILS NFR BLD AUTO: 44 %
NITRITE UR QL STRIP.AUTO: NEGATIVE
NRBC BLD-RTO: 0 /100 WBCS (ref 0–0)
P AXIS: 84 DEGREES
P OFFSET: 154 MS
P ONSET: 103 MS
PH UR STRIP.AUTO: 5.5 [PH]
PLATELET # BLD AUTO: 155 X10*3/UL (ref 150–450)
POTASSIUM SERPL-SCNC: 3.6 MMOL/L (ref 3.5–5.3)
PR INTERVAL: 240 MS
PROT SERPL-MCNC: 5.3 G/DL (ref 6.4–8.2)
PROT UR STRIP.AUTO-MCNC: NEGATIVE MG/DL
PROTHROMBIN TIME: 13.8 SECONDS (ref 9.8–12.4)
Q ONSET: 223 MS
QRS COUNT: 13 BEATS
QRS DURATION: 80 MS
QT INTERVAL: 368 MS
QTC CALCULATION(BAZETT): 419 MS
QTC FREDERICIA: 401 MS
R AXIS: 60 DEGREES
RBC # BLD AUTO: 3.21 X10*6/UL (ref 4–5.2)
RBC # UR STRIP.AUTO: NEGATIVE MG/DL
SODIUM SERPL-SCNC: 144 MMOL/L (ref 136–145)
SP GR UR STRIP.AUTO: 1.02
T AXIS: 16 DEGREES
T OFFSET: 407 MS
UROBILINOGEN UR STRIP.AUTO-MCNC: NORMAL MG/DL
VENTRICULAR RATE: 78 BPM
WBC # BLD AUTO: 4 X10*3/UL (ref 4.4–11.3)

## 2025-02-28 PROCEDURE — 71045 X-RAY EXAM CHEST 1 VIEW: CPT | Performed by: STUDENT IN AN ORGANIZED HEALTH CARE EDUCATION/TRAINING PROGRAM

## 2025-02-28 PROCEDURE — 85610 PROTHROMBIN TIME: CPT | Performed by: PHYSICIAN ASSISTANT

## 2025-02-28 PROCEDURE — 83605 ASSAY OF LACTIC ACID: CPT | Performed by: PHYSICIAN ASSISTANT

## 2025-02-28 PROCEDURE — 84484 ASSAY OF TROPONIN QUANT: CPT | Performed by: PHYSICIAN ASSISTANT

## 2025-02-28 PROCEDURE — 36415 COLL VENOUS BLD VENIPUNCTURE: CPT | Performed by: PHYSICIAN ASSISTANT

## 2025-02-28 PROCEDURE — 2550000001 HC RX 255 CONTRASTS: Performed by: PHYSICIAN ASSISTANT

## 2025-02-28 PROCEDURE — 93005 ELECTROCARDIOGRAM TRACING: CPT

## 2025-02-28 PROCEDURE — 2500000004 HC RX 250 GENERAL PHARMACY W/ HCPCS (ALT 636 FOR OP/ED): Performed by: PHYSICIAN ASSISTANT

## 2025-02-28 PROCEDURE — 99285 EMERGENCY DEPT VISIT HI MDM: CPT | Mod: 25

## 2025-02-28 PROCEDURE — 74177 CT ABD & PELVIS W/CONTRAST: CPT

## 2025-02-28 PROCEDURE — 81003 URINALYSIS AUTO W/O SCOPE: CPT | Performed by: REGISTERED NURSE

## 2025-02-28 PROCEDURE — 80053 COMPREHEN METABOLIC PANEL: CPT | Performed by: PHYSICIAN ASSISTANT

## 2025-02-28 PROCEDURE — 83690 ASSAY OF LIPASE: CPT | Performed by: PHYSICIAN ASSISTANT

## 2025-02-28 PROCEDURE — 71045 X-RAY EXAM CHEST 1 VIEW: CPT

## 2025-02-28 PROCEDURE — 85025 COMPLETE CBC W/AUTO DIFF WBC: CPT | Performed by: PHYSICIAN ASSISTANT

## 2025-02-28 PROCEDURE — 83880 ASSAY OF NATRIURETIC PEPTIDE: CPT | Performed by: PHYSICIAN ASSISTANT

## 2025-02-28 RX ORDER — MENTHOL AND ZINC OXIDE .44; 20.625 G/100G; G/100G
1 OINTMENT TOPICAL 4 TIMES DAILY PRN
Qty: 20 G | Refills: 0 | Status: SHIPPED | OUTPATIENT
Start: 2025-02-28

## 2025-02-28 RX ORDER — MENTHOL AND ZINC OXIDE .44; 20.625 G/100G; G/100G
1 OINTMENT TOPICAL 4 TIMES DAILY PRN
Qty: 20 G | Refills: 0 | Status: SHIPPED | OUTPATIENT
Start: 2025-02-28 | End: 2025-02-28

## 2025-02-28 RX ORDER — HEPARIN 100 UNIT/ML
5 SYRINGE INTRAVENOUS ONCE
Status: COMPLETED | OUTPATIENT
Start: 2025-02-28 | End: 2025-02-28

## 2025-02-28 RX ADMIN — HEPARIN 500 UNITS: 100 SYRINGE at 20:43

## 2025-02-28 RX ADMIN — IOHEXOL 75 ML: 350 INJECTION, SOLUTION INTRAVENOUS at 19:10

## 2025-02-28 ASSESSMENT — PAIN DESCRIPTION - LOCATION: LOCATION: VAGINA

## 2025-02-28 ASSESSMENT — PAIN DESCRIPTION - DESCRIPTORS: DESCRIPTORS: BURNING

## 2025-02-28 ASSESSMENT — PAIN - FUNCTIONAL ASSESSMENT: PAIN_FUNCTIONAL_ASSESSMENT: 0-10

## 2025-02-28 ASSESSMENT — COLUMBIA-SUICIDE SEVERITY RATING SCALE - C-SSRS
6. HAVE YOU EVER DONE ANYTHING, STARTED TO DO ANYTHING, OR PREPARED TO DO ANYTHING TO END YOUR LIFE?: NO
1. IN THE PAST MONTH, HAVE YOU WISHED YOU WERE DEAD OR WISHED YOU COULD GO TO SLEEP AND NOT WAKE UP?: NO
2. HAVE YOU ACTUALLY HAD ANY THOUGHTS OF KILLING YOURSELF?: NO

## 2025-02-28 ASSESSMENT — PAIN SCALES - GENERAL
PAINLEVEL_OUTOF10: 10 - WORST POSSIBLE PAIN
PAINLEVEL_OUTOF10: 0 - NO PAIN

## 2025-02-28 ASSESSMENT — PAIN DESCRIPTION - PAIN TYPE: TYPE: ACUTE PAIN

## 2025-02-28 NOTE — ED PROVIDER NOTES
HPI   Chief Complaint   Patient presents with    Female Dysuria     Diagnosed with uti and not responding to oral antibiotics, dysuria and abdominal pain and bilateral lower leg swelling        89-year-old female with a history of A-fib on Eliquis, HTN, CHF, lymphedema, venous stasis, DVT, hypothyroidism, CKD, B-cell lymphoma presenting to the ER today with abdominal pain, painful urination.  Patient tells me that she does get frequent UTIs and that she has had some burning with urination and some discomfort in that area not when urinating for the past several weeks.  She was on a 10-day course of antibiotics and it did not get any better.  She has not had any fevers, nausea or vomiting or back pain.  She now has pain in her abdomen.  She is moving her bowels and denies dark or bloody stools, she is passing gas.  She states that earlier today she also had a little bit of pain in her chest but not currently.  She does not feel short of breath and has not had any cough or hemoptysis.  She has been compliant with her Eliquis.  She states that she does have some swelling in both of her legs that is chronic for her but seems slightly worse.  She does not smoke.  No further complaints at this time.      History provided by:  Patient (daughter)          Patient History   Past Medical History:   Diagnosis Date    Encounter for preprocedural cardiovascular examination 01/31/2017    Pre-operative cardiovascular examination    HL (hearing loss)     Patient states hard of hearing    Lymphedema, not elsewhere classified 12/16/2022    Lymphedema    Obesity, unspecified 11/10/2022    Class 1 obesity with body mass index (BMI) of 33.0 to 33.9 in adult    Obesity, unspecified 10/27/2022    Class 1 obesity with body mass index (BMI) of 34.0 to 34.9 in adult    Other bursitis of hip, left hip 10/12/2021    Hip bursitis, left    Other conditions influencing health status 01/31/2017    Cardiovascular Stress Test    Other seasonal  allergic rhinitis 11/10/2022    Seasonal allergies    Other specified noninflammatory disorders of vagina 06/23/2022    Vaginal lesion    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    History of malignant lymphoma    Personal history of non-Hodgkin lymphomas     History of lymphoma    Personal history of other diseases of the circulatory system 01/25/2017    History of abnormal electrocardiography    Personal history of other diseases of the circulatory system     History of coronary artery disease    Personal history of other diseases of the digestive system 04/20/2022    History of rectal bleeding    Personal history of other diseases of the digestive system 06/06/2022    History of rectal bleeding    Personal history of other diseases of the digestive system 06/06/2022    History of constipation    Personal history of other diseases of the digestive system 04/19/2022    History of anal fissures    Personal history of other diseases of the respiratory system 10/27/2022    History of upper respiratory infection    Personal history of other diseases of urinary system 12/16/2022    History of renal insufficiency syndrome    Personal history of other endocrine, nutritional and metabolic disease 10/12/2021    History of obesity    Personal history of other infectious and parasitic diseases 06/23/2022    History of herpes simplex infection    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    History of other malignant neoplasm of large intestine    Personal history of other specified conditions 06/23/2022    History of urinary frequency    Personal history of other specified conditions 06/23/2022    History of gross hematuria    Personal history of other specified conditions 01/13/2022    History of dysuria    Personal history of other specified conditions 01/04/2022    History of dysuria    Persons encountering health services in other specified circumstances 10/12/2021     Encounter to establish care with new doctor    Subacute and chronic vaginitis 01/13/2022    Chronic vaginitis    Subacute and chronic vaginitis 01/04/2022    Subacute and chronic vaginitis    Trochanteric bursitis, left hip 06/06/2022    Greater trochanteric bursitis of left hip    Unspecified disorder of synovium and tendon, left thigh 06/06/2022    Tendinopathy of left gluteus medius    Unspecified disorder of synovium and tendon, left thigh 10/12/2021    Tendinopathy of left gluteus medius    Unspecified symptoms and signs involving the genitourinary system 01/04/2022    UTI symptoms    Unspecified symptoms and signs involving the genitourinary system     UTI symptoms    Urge incontinence 06/23/2022    Urge incontinence     Past Surgical History:   Procedure Laterality Date    HERNIA REPAIR  10/2023    OTHER SURGICAL HISTORY  06/06/2022    Colonoscopy    OTHER SURGICAL HISTORY  10/12/2021    Tonsillectomy    OTHER SURGICAL HISTORY  10/12/2021    Cholecystectomy    OTHER SURGICAL HISTORY  10/12/2021    Sinus surgery    OTHER SURGICAL HISTORY  10/12/2021    Appendectomy    OTHER SURGICAL HISTORY  10/12/2021    Hernia repair    OTHER SURGICAL HISTORY  10/12/2021    Bladder surgery    OTHER SURGICAL HISTORY  10/12/2021    Knee replacement    OTHER SURGICAL HISTORY  10/12/2021    Cataract surgery    OTHER SURGICAL HISTORY  10/12/2021    Dilation and curettage    OTHER SURGICAL HISTORY  10/12/2021    Revision knee arthroplasty    OTHER SURGICAL HISTORY  10/12/2021    Neck surgery    OTHER SURGICAL HISTORY  10/12/2021    Carpal tunnel surgery     Family History   Problem Relation Name Age of Onset    Hypertension Mother      Glaucoma Mother       Social History     Tobacco Use    Smoking status: Former     Types: Cigarettes    Smokeless tobacco: Never   Vaping Use    Vaping status: Never Used   Substance Use Topics    Alcohol use: Not Currently    Drug use: Never       Physical Exam   ED Triage Vitals   Temperature  Heart Rate Respirations BP   02/28/25 1435 02/28/25 1435 02/28/25 1435 02/28/25 1435   36.7 °C (98.1 °F) 87 17 (!) 178/96      Pulse Ox Temp Source Heart Rate Source Patient Position   02/28/25 1435 02/28/25 1435 02/28/25 1435 02/28/25 1718   96 % Temporal Monitor Sitting      BP Location FiO2 (%)     02/28/25 1718 --     Left arm        Physical Exam  Constitutional:       General: She is not in acute distress.  Eyes:      Conjunctiva/sclera: Conjunctivae normal.   Cardiovascular:      Rate and Rhythm: Normal rate and regular rhythm.      Pulses: Normal pulses.      Heart sounds: Normal heart sounds.      Comments: Pitting edema to bilateral lower extremities.  Distal pulses intact and symmetric.  Pulmonary:      Effort: Pulmonary effort is normal.      Comments: Breath sounds diminished at the bases, no rhonchi or rales.  Abdominal:      General: Bowel sounds are normal. There is no distension.      Palpations: Abdomen is soft.      Tenderness: There is abdominal tenderness. There is no right CVA tenderness, left CVA tenderness, guarding or rebound.      Comments: Tenderness diffusely through the lower abdomen and periumbilical region without guarding or rebound.  No discrete hernia or mass.   Musculoskeletal:      Comments: Chest wall nontender, atraumatic.  Edema to the bilateral lower extremities without tenderness on palpation, crepitus or erythema.   Skin:     General: Skin is warm.      Capillary Refill: Capillary refill takes less than 2 seconds.   Neurological:      Mental Status: She is alert and oriented to person, place, and time.      Comments: Speech normal           ED Course & MDM   Diagnoses as of 02/28/25 2018   Dysuria   Constipation, unspecified constipation type                 No data recorded     Debby Coma Scale Score: 15 (02/28/25 1436 : Melonie Brown, [place])                           Medical Decision Making  89-year-old female with a history of A-fib on Eliquis, DVT, hypothyroidism, HTN and  CHF, venous stasis, CKD and B-cell lymphoma presenting to the ER today with abdominal pain, painful urination and swelling in her legs.  Patient was on a 10-day course of Macrobid and did not get any better for her dysuria so she presents to the ER.  She has not had any fevers, nausea or vomiting but she does have pain in her abdomen and the swelling that is chronic in her legs seem slightly worse.  She also had a episode of chest pain earlier today that is since resolved.  Patient arrives afebrile with stable vital signs.  She is nontoxic-appearing resting on exam no signs of acute distress.  Heart RRR, lungs are slightly diminished at the bases but no rhonchi or rales.  She is perfusing well with good distal pulses and she does have pitting edema to the lower extremities, lymphedema.  No CVA tenderness or flank tenderness and abdomen is soft and nondistended normal bowel sounds and she is tender in the periumbilical region as well as diffusely through the lower abdomen without guarding or rebound.  Workup is initiated after my assessment.    ECG per my interpretation normal sinus rhythm at 78 bpm with first-degree AV block, T wave inversions in V3 through V6.  CBC with stable hemoglobin at 11.1, leukopenia 4.0.  Platelets normal at 155.  Lactate is 0.5, INR is 1.2.  Chest x-ray shows no acute cardiopulmonary process.  BNP mildly elevated at 125, first troponin is 5 and will be trended.  Labs otherwise within normal limits at this time and patient is pending repeat ECG, troponin and CT abdomen and pelvis.    Repeat ECG per my interpretation remains normal sinus rhythm at 72 bpm with nonspecific changes but again no acute ST elevations or depressions.  On chart review of patient's old EKGs she has had these T wave inversions previously and patient has have negative troponins and is not having any current chest pain.  Patient's repeat troponin is again 5.  CT abdomen pelvis was performed today and shows a central  mesenteric mass with surrounding mesenteric vessels corresponding to lymphoma as noted on her previous PET scan.  It is smaller than the time of the prior prep and has not significantly changed.  There is prominent colonic stool with concern for constipation but no evidence of obstruction.  Patient is status post cholecystectomy with unchanged appearance of intrahepatic and extrahepatic biliary dilatation and there are also no acute abnormalities in her AST ALT or lipase.  There is a stone within the right kidney itself but no evidence of ureteral stone or hydronephrosis.  On repeat assessment patient is resting comfortably and she feels improved.  She is not endorsing any pain other than some irritation near her labia.  The last time patient had symptoms like these she was admitted to the hospital and was found not to have a UTI but rather was given Calmoseptine and patient and family states that this really did help her symptoms.  They are requesting this cream again.  Patient is not undergoing any current or future radiation therapy.  They have Colace and MiraLAX at home that they will use for her constipation as she has felt a little constipated recently.  She does have a gynecologist so we will have her follow-up closely with her as well as with her primary care doctor.  I did discuss warning signs to return to the ED and they expressed understanding and agreed with the plan of care today.      Labs Reviewed   CBC WITH AUTO DIFFERENTIAL - Abnormal       Result Value    WBC 4.0 (*)     nRBC 0.0      RBC 3.21 (*)     Hemoglobin 11.1 (*)     Hematocrit 34.5 (*)      (*)     MCH 34.6 (*)     MCHC 32.2      RDW 12.9      Platelets 155      Neutrophils % 44.0      Immature Granulocytes %, Automated 0.3      Lymphocytes % 39.3      Monocytes % 11.8      Eosinophils % 3.3      Basophils % 1.3      Neutrophils Absolute 1.75      Immature Granulocytes Absolute, Automated 0.01      Lymphocytes Absolute 1.56       Monocytes Absolute 0.47      Eosinophils Absolute 0.13      Basophils Absolute 0.05     COMPREHENSIVE METABOLIC PANEL - Abnormal    Glucose 86      Sodium 144      Potassium 3.6      Chloride 117 (*)     Bicarbonate 19 (*)     Anion Gap 12      Urea Nitrogen 24 (*)     Creatinine 0.96      eGFR 57 (*)     Calcium 7.6 (*)     Albumin 3.4      Alkaline Phosphatase 78      Total Protein 5.3 (*)     AST 14      Bilirubin, Total 0.3      ALT 12     PROTIME-INR - Abnormal    Protime 13.8 (*)     INR 1.2 (*)    B-TYPE NATRIURETIC PEPTIDE - Abnormal     (*)     Narrative:        <100 pg/mL - Heart failure unlikely  100-299 pg/mL - Intermediate probability of acute heart                  failure exacerbation. Correlate with clinical                  context and patient history.    >=300 pg/mL - Heart Failure likely. Correlate with clinical                  context and patient history.    BNP testing is performed using different testing methodology at [facility] than at other [facility]. Direct result comparisons should only be made within the same method.      URINALYSIS WITH REFLEX CULTURE AND MICROSCOPIC - Normal    Color, Urine Light-Yellow      Appearance, Urine Clear      Specific Gravity, Urine 1.016      pH, Urine 5.5      Protein, Urine NEGATIVE      Glucose, Urine Normal      Blood, Urine NEGATIVE      Ketones, Urine NEGATIVE      Bilirubin, Urine NEGATIVE      Urobilinogen, Urine Normal      Nitrite, Urine NEGATIVE      Leukocyte Esterase, Urine NEGATIVE     LIPASE - Normal    Lipase 24      Narrative:     Venipuncture immediately after or during the administration of Metamizole may lead to falsely low results. Testing should be performed immediately prior to Metamizole dosing.   LACTATE - Normal    Lactate 0.5      Narrative:     Venipuncture immediately after or during the administration of Metamizole may lead to falsely low results. Testing should be performed immediately prior to  Metamizole dosing.   SERIAL TROPONIN-INITIAL - Normal    Troponin I, High Sensitivity 5      Narrative:     Less than 99th percentile of normal range cutoff-  Female and children under 18 years old <14 ng/L; Male <21 ng/L: Negative  Repeat testing should be performed if clinically indicated.     Female and children under 18 years old 14-50 ng/L; Male 21-50 ng/L:  Consistent with possible cardiac damage and possible increased clinical   risk. Serial measurements may help to assess extent of myocardial damage.     >50 ng/L: Consistent with cardiac damage, increased clinical risk and  myocardial infarction. Serial measurements may help assess extent of   myocardial damage.      NOTE: Children less than 1 year old may have higher baseline troponin   levels and results should be interpreted in conjunction with the overall   clinical context.     NOTE: Troponin I testing is performed using a different   testing methodology at [facility] than at other   [facility]. Direct result comparisons should only   be made within the same method.   SERIAL TROPONIN, 1 HOUR - Normal    Troponin I, High Sensitivity 5      Narrative:     Less than 99th percentile of normal range cutoff-  Female and children under 18 years old <14 ng/L; Male <21 ng/L: Negative  Repeat testing should be performed if clinically indicated.     Female and children under 18 years old 14-50 ng/L; Male 21-50 ng/L:  Consistent with possible cardiac damage and possible increased clinical   risk. Serial measurements may help to assess extent of myocardial damage.     >50 ng/L: Consistent with cardiac damage, increased clinical risk and  myocardial infarction. Serial measurements may help assess extent of   myocardial damage.      NOTE: Children less than 1 year old may have higher baseline troponin   levels and results should be interpreted in conjunction with the overall   clinical context.     NOTE: Troponin I testing is performed using a  different   testing methodology at [facility] than at other   [facility]. Direct result comparisons should only   be made within the same method.   TROPONIN SERIES- (INITIAL, 1 HR)    Narrative:     The following orders were created for panel order Troponin I Series, High Sensitivity (0, 1 HR).  Procedure                               Abnormality         Status                     ---------                               -----------         ------                     Troponin I, High Sensiti...[[id number]]  Normal              Final result               Troponin, High Sensitivi...[[id number]]  Normal              Final result                 Please view results for these tests on the individual orders.   URINALYSIS WITH REFLEX CULTURE AND MICROSCOPIC    Narrative:     The following orders were created for panel order Urinalysis with Reflex Culture and Microscopic.  Procedure                               Abnormality         Status                     ---------                               -----------         ------                     Urinalysis with Reflex C...[[id number]]  Normal              Final result               Extra Urine Gray Tube[[id number]]                                                         Please view results for these tests on the individual orders.   EXTRA URINE GRAY TUBE       CT abdomen pelvis w IV contrast   Final Result   1.  Central mesenteric mass surrounding mesenteric vessels   corresponds to lymphoma as noted on the prior PET-CT. It is smaller   than at the time of prior PET, not significantly changed since the   more recent prior study. Unclear if there is any active lymphoma or   if this is treated disease.   2. Prominent colonic stool. Correlate for constipation. No   obstruction of the large or small bowel.   3. Status post cholecystectomy. Unchanged intra and extrahepatic   biliary dilation, although more pronounced than typical reservoir   effect. Correlate with  biliary labs; if abnormal, suggest follow-up   MRCP.   4. There is a single nonobstructing stone identified in the right   kidney. Previously there were 2 calculi in the right kidney, 1 is   presumed to have passed. No ureteral stone or hydronephrosis.             Signed by: Lenora Leahy 2/28/2025 7:55 PM   Dictation workstation:   [id number]      XR chest 1 view   Final Result   1.  No evidence of acute cardiopulmonary process.             Signed by: Haim Crain 2/28/2025 5:44 PM   Dictation workstation:   [id number]          Procedure  Procedures     Liane Ayala PA-C  02/28/25 2033

## 2025-03-01 ENCOUNTER — HOSPITAL ENCOUNTER (OUTPATIENT)
Dept: CARDIOLOGY | Facility: HOSPITAL | Age: 89
Discharge: HOME | End: 2025-03-01
Payer: MEDICARE

## 2025-03-01 LAB
ATRIAL RATE: 72 BPM
P AXIS: 87 DEGREES
P OFFSET: 151 MS
P ONSET: 97 MS
PR INTERVAL: 252 MS
Q ONSET: 223 MS
QRS COUNT: 12 BEATS
QRS DURATION: 82 MS
QT INTERVAL: 386 MS
QTC CALCULATION(BAZETT): 422 MS
QTC FREDERICIA: 410 MS
R AXIS: 49 DEGREES
T AXIS: -1 DEGREES
T OFFSET: 416 MS
VENTRICULAR RATE: 72 BPM

## 2025-03-17 NOTE — PROGRESS NOTES
Laura Woodward, amalia 89 y.o. female was seen today for:      Chief Complaint   Patient presents with    Edema     Patient being called today for complaints of bilateral leg and arm swelling x 1 month. Daughter Soraya advised that Dr. Paris did call in Lasix for the patient last month. The patient was taking the medication and it was helping but then after a couple days she started having diarrhea. Daughter is concerned that the swelling could possible mean an issue with her heart.     home health     Daughter is questioning getting home health services. Daughter states that it is difficult to get the patient out of the house. I did advise that home health could assist with doing things with blood draw if needed.       Laura Woodward   Was on the video conference with me today.  We discussed about lower extremity edema.  Patient is pretty much bedbound.  She has morbid obesity with congestive heart failure.  Occasionally she takes diuretics.  Last time her Lasix did not work.  We possibly you have to go up on diuretics.  Will do follow-up kidney function test.  I will start her with torsemide for now.  She would also benefit from home health care.  Patient is unable to come out of home for a office visit.      MEDICAL DECISION MAKING:  - Current co morbidities have been considered.   - All pertinent labs and images were addressed as per medical necessity.    - Also reviewed relevant notes from the specialty consultants.     - Time spent before, during and after office visit, which includes coordination of care counseling was 25 minutes  - Next follow up : 3 months    TODAY'S VISIT  DX:     1. Acute on chronic diastolic (congestive) heart failure  torsemide (Demadex) 100 mg tablet      2. 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 Morbid (severe) obesity due to excess calories (Multi)  Will benefit from home health services.  She is pretty much  bedbound.  She needs PT OT      4. Chronic kidney disease, stage 3b (Multi)  Comprehensive metabolic panel      5. Atrial flutter, unspecified type (Multi)  Stable at this time.         Visit Vitals  OB Status Unknown   Smoking Status Former     Wt Readings from Last 10 Encounters:   02/28/25 109 kg (240 lb)   10/02/24 101 kg (222 lb 10.6 oz)   03/21/24 95 kg (209 lb 6.4 oz)   03/05/24 96.2 kg (212 lb)   02/26/24 104 kg (230 lb 6.1 oz)   01/12/24 104 kg (230 lb)   10/19/23 104 kg (229 lb 4.5 oz)   08/30/23 104 kg (230 lb)   06/23/22 106 kg (233 lb)   04/19/22 106 kg (233 lb)       MEDICATIONS:   Current Outpatient Medications   Medication Instructions    acetaminophen (TYLENOL) 650 mg, Every 6 hours PRN    amlodipine-olmesartan (Celia) 10-40 mg tablet 1 tablet, oral, Daily RT    apixaban (ELIQUIS) 5 mg, oral, 2 times daily    aspirin (NELDA LOW DOSE ASPIRIN) 81 mg, Daily    celecoxib (CELEBREX) 100 mg, oral, Daily    Cetaphil moisturizing lotion Topical, As needed    cetirizine (ZYRTEC) 10 mg, Daily PRN    cloNIDine (Catapres-TTS) 0.3 mg/24 hr patch 1 patch, transdermal, Once Weekly, Apply one patach on the skin and replace every 7 days, as directed    docusate sodium (COLACE) 100 mg, 2 times daily    estradiol (ESTRACE) 0.01 g, vaginal, Nightly, 3 x a week    latanoprost (Xalatan) 0.005 % ophthalmic solution 1 drop, Nightly    levothyroxine (SYNTHROID, LEVOXYL) 50 mcg, oral, Daily    lidocaine 4 % patch 1 patch, transdermal, Daily, Remove & discard patch within 12 hours or as directed by MD.    menthol-zinc oxide (Calmoseptine - Risamine) 0.44-20.6 % ointment 1 Application, Topical, 4 times daily PRN    metoprolol succinate XL (TOPROL-XL) 12.5 mg, oral, Daily, Do not crush or chew.    nitroglycerin (NITROSTAT) 0.4 mg, sublingual, Every 5 min PRN, May repeat dose every 5 minutes for up to 3 doses total.    Revlimid 5 mg, Daily    spironolactone (ALDACTONE) 12.5 mg, oral, Daily    timolol (Timoptic) 0.5 % ophthalmic  solution 1 drop, Every 12 hours    torsemide (DEMADEX) 10 mg, oral, Every Mon/Wed/Fri    torsemide (DEMADEX) 100 mg, oral, Daily, For next 7 days then go for lab work.    traMADol (ULTRAM) 50 mg, oral, Every 8 hours PRN    trospium (SANCTURA) 10 mg, oral, 2 times daily       Review of Systems   Constitutional:  Negative for activity change and fever.   HENT:  Negative for hearing loss, nosebleeds and tinnitus.    Eyes:  Negative for redness.   Respiratory:  Negative for chest tightness and stridor.    Cardiovascular:  Negative for chest pain, palpitations and leg swelling.   Gastrointestinal:  Negative for blood in stool.   Endocrine: Negative for cold intolerance.   Genitourinary:  Negative for hematuria.   Musculoskeletal:  Negative for joint swelling.   Skin:  Negative for rash.   Neurological:  Negative for speech difficulty and light-headedness.   Psychiatric/Behavioral:  Negative for behavioral problems.       Physical Exam  Constitutional:       General: She is not in acute distress.     Appearance: Normal appearance.   HENT:      Head: Normocephalic.      Right Ear: Tympanic membrane normal.      Left Ear: Tympanic membrane normal.      Mouth/Throat:      Mouth: Mucous membranes are moist.   Cardiovascular:      Rate and Rhythm: Normal rate and regular rhythm.      Heart sounds: No murmur heard.  Pulmonary:      Effort: No respiratory distress.   Abdominal:      Palpations: Abdomen is soft.   Musculoskeletal:      Cervical back: Neck supple.      Right lower leg: No edema.      Left lower leg: No edema.   Skin:     Findings: No rash.   Neurological:      General: No focal deficit present.      Mental Status: She is alert and oriented to person, place, and time.   Psychiatric:         Mood and Affect: Mood normal.      RECENT LABS:  Lab Results   Component Value Date    WBC 4.0 (L) 02/28/2025    HGB 11.1 (L) 02/28/2025    HCT 34.5 (L) 02/28/2025     02/28/2025    CHOL 160 12/08/2022    TRIG 133  12/08/2022    HDL 51.7 12/08/2022    ALT 12 02/28/2025    AST 14 02/28/2025     02/28/2025    K 3.6 02/28/2025     (H) 02/28/2025    CREATININE 0.96 02/28/2025    BUN 24 (H) 02/28/2025    CO2 19 (L) 02/28/2025    TSH 1.26 02/26/2024    INR 1.2 (H) 02/28/2025     Lab Results   Component Value Date    GLUCOSE 86 02/28/2025    CALCIUM 7.6 (L) 02/28/2025     02/28/2025    K 3.6 02/28/2025    CO2 19 (L) 02/28/2025     (H) 02/28/2025    BUN 24 (H) 02/28/2025    CREATININE 0.96 02/28/2025      Lab Results   Component Value Date    CREATININE 0.96 02/28/2025       P.S: This note was completed using Dragon voice recognition technology and may include unintended errors with respect to translation of words, typographical errors or grammar errors which may not have been identified while finalizing the chart.

## 2025-03-26 DIAGNOSIS — I48.91 ATRIAL FIBRILLATION, UNSPECIFIED TYPE (MULTI): ICD-10-CM

## 2025-03-26 DIAGNOSIS — I50.31 ACUTE DIASTOLIC CONGESTIVE HEART FAILURE: ICD-10-CM

## 2025-03-26 RX ORDER — TORSEMIDE 10 MG/1
10 TABLET ORAL
Qty: 36 TABLET | Refills: 3 | Status: SHIPPED | OUTPATIENT
Start: 2025-03-26 | End: 2026-03-26

## 2025-03-26 NOTE — TELEPHONE ENCOUNTER
Pharmacy Rx Request    Last OV: Telemedicine 10-  Pending OV: NONE    Please schedule future office visit. Thank You!

## 2025-03-28 DIAGNOSIS — M16.12 ARTHRITIS OF LEFT HIP: ICD-10-CM

## 2025-03-28 DIAGNOSIS — E03.9 HYPOTHYROIDISM, UNSPECIFIED TYPE: ICD-10-CM

## 2025-03-28 DIAGNOSIS — I10 ESSENTIAL HYPERTENSION: ICD-10-CM

## 2025-03-28 DIAGNOSIS — N39.46 MIXED STRESS AND URGE URINARY INCONTINENCE: ICD-10-CM

## 2025-03-28 RX ORDER — AMLODIPINE AND OLMESARTAN MEDOXOMIL 10; 40 MG/1; MG/1
1 TABLET ORAL
Qty: 90 TABLET | Refills: 3 | Status: SHIPPED | OUTPATIENT
Start: 2025-03-28 | End: 2026-03-28

## 2025-03-28 RX ORDER — LEVOTHYROXINE SODIUM 50 UG/1
50 TABLET ORAL DAILY
Qty: 90 TABLET | Refills: 3 | Status: SHIPPED | OUTPATIENT
Start: 2025-03-28 | End: 2026-03-28

## 2025-03-28 RX ORDER — TROSPIUM CHLORIDE 20 MG/1
10 TABLET, FILM COATED ORAL 2 TIMES DAILY
Qty: 90 TABLET | Refills: 3 | Status: SHIPPED | OUTPATIENT
Start: 2025-03-28 | End: 2026-03-28

## 2025-03-28 RX ORDER — CELECOXIB 100 MG/1
100 CAPSULE ORAL DAILY
Qty: 30 CAPSULE | Refills: 11 | Status: SHIPPED | OUTPATIENT
Start: 2025-03-28 | End: 2026-03-28

## 2025-04-17 ENCOUNTER — APPOINTMENT (OUTPATIENT)
Dept: PRIMARY CARE | Facility: CLINIC | Age: 89
End: 2025-04-17
Payer: MEDICARE

## 2025-04-17 DIAGNOSIS — I82.412 ACUTE DEEP VEIN THROMBOSIS (DVT) OF FEMORAL VEIN OF LEFT LOWER EXTREMITY: ICD-10-CM

## 2025-04-17 DIAGNOSIS — I48.91 ATRIAL FIBRILLATION, UNSPECIFIED TYPE (MULTI): ICD-10-CM

## 2025-04-17 NOTE — TELEPHONE ENCOUNTER
Last OV: 01/22/2025  Pending none    Patients daughter requesting a short term of the rx to be sent to local pharmacy. The mail supply is working on her script right now but patient will be out of medication before the mail supply is shipped. Pended 30 day supply.

## 2025-06-19 DIAGNOSIS — M81.0 AGE RELATED OSTEOPOROSIS, UNSPECIFIED PATHOLOGICAL FRACTURE PRESENCE: ICD-10-CM

## 2025-06-19 RX ORDER — ESTRADIOL 0.1 MG/G
0.01 CREAM VAGINAL NIGHTLY
Qty: 1 G | Refills: 3 | Status: SHIPPED | OUTPATIENT
Start: 2025-06-19 | End: 2026-06-19

## 2025-07-17 ENCOUNTER — APPOINTMENT (OUTPATIENT)
Dept: PRIMARY CARE | Facility: CLINIC | Age: 89
End: 2025-07-17
Payer: MEDICARE

## 2025-08-11 ENCOUNTER — HOSPITAL ENCOUNTER (OUTPATIENT)
Dept: RADIOLOGY | Facility: HOSPITAL | Age: 89
Discharge: HOME | End: 2025-08-11
Payer: MEDICARE

## 2025-08-11 ENCOUNTER — APPOINTMENT (OUTPATIENT)
Dept: PRIMARY CARE | Facility: CLINIC | Age: 89
End: 2025-08-11
Payer: MEDICARE

## 2025-08-11 VITALS
DIASTOLIC BLOOD PRESSURE: 82 MMHG | OXYGEN SATURATION: 95 % | HEART RATE: 81 BPM | SYSTOLIC BLOOD PRESSURE: 132 MMHG | TEMPERATURE: 97.9 F | HEIGHT: 70 IN | BODY MASS INDEX: 34.44 KG/M2

## 2025-08-11 DIAGNOSIS — R10.30 LOWER ABDOMINAL PAIN: ICD-10-CM

## 2025-08-11 DIAGNOSIS — I48.0 PAROXYSMAL ATRIAL FIBRILLATION (MULTI): ICD-10-CM

## 2025-08-11 DIAGNOSIS — I48.91 ATRIAL FIBRILLATION, UNSPECIFIED TYPE (MULTI): ICD-10-CM

## 2025-08-11 DIAGNOSIS — R39.9 URINARY TRACT INFECTION SYMPTOMS: Primary | ICD-10-CM

## 2025-08-11 DIAGNOSIS — R30.0 DYSURIA: ICD-10-CM

## 2025-08-11 DIAGNOSIS — I10 ESSENTIAL HYPERTENSION: ICD-10-CM

## 2025-08-11 DIAGNOSIS — R52 GENERALIZED PAIN: ICD-10-CM

## 2025-08-11 DIAGNOSIS — E03.9 HYPOTHYROIDISM, UNSPECIFIED TYPE: ICD-10-CM

## 2025-08-11 DIAGNOSIS — N30.00 ACUTE CYSTITIS WITHOUT HEMATURIA: Primary | ICD-10-CM

## 2025-08-11 DIAGNOSIS — I82.412 ACUTE DEEP VEIN THROMBOSIS (DVT) OF FEMORAL VEIN OF LEFT LOWER EXTREMITY: ICD-10-CM

## 2025-08-11 DIAGNOSIS — I50.31 ACUTE DIASTOLIC CONGESTIVE HEART FAILURE: ICD-10-CM

## 2025-08-11 LAB
POC APPEARANCE, URINE: CLEAR
POC BILIRUBIN, URINE: NEGATIVE
POC BLOOD, URINE: NEGATIVE
POC COLOR, URINE: ABNORMAL
POC GLUCOSE, URINE: NEGATIVE MG/DL
POC KETONES, URINE: NEGATIVE MG/DL
POC LEUKOCYTES, URINE: ABNORMAL
POC NITRITE,URINE: NEGATIVE
POC PH, URINE: 5 PH
POC PROTEIN, URINE: ABNORMAL MG/DL
POC SPECIFIC GRAVITY, URINE: 1.01
POC UROBILINOGEN, URINE: 0.2 EU/DL

## 2025-08-11 PROCEDURE — 1159F MED LIST DOCD IN RCRD: CPT | Performed by: INTERNAL MEDICINE

## 2025-08-11 PROCEDURE — 3079F DIAST BP 80-89 MM HG: CPT | Performed by: INTERNAL MEDICINE

## 2025-08-11 PROCEDURE — 1124F ACP DISCUSS-NO DSCNMKR DOCD: CPT | Performed by: INTERNAL MEDICINE

## 2025-08-11 PROCEDURE — 74019 RADEX ABDOMEN 2 VIEWS: CPT | Performed by: RADIOLOGY

## 2025-08-11 PROCEDURE — 81002 URINALYSIS NONAUTO W/O SCOPE: CPT | Performed by: INTERNAL MEDICINE

## 2025-08-11 PROCEDURE — G2211 COMPLEX E/M VISIT ADD ON: HCPCS | Performed by: INTERNAL MEDICINE

## 2025-08-11 PROCEDURE — 1160F RVW MEDS BY RX/DR IN RCRD: CPT | Performed by: INTERNAL MEDICINE

## 2025-08-11 PROCEDURE — 3075F SYST BP GE 130 - 139MM HG: CPT | Performed by: INTERNAL MEDICINE

## 2025-08-11 PROCEDURE — 99214 OFFICE O/P EST MOD 30 MIN: CPT | Performed by: INTERNAL MEDICINE

## 2025-08-11 PROCEDURE — 74019 RADEX ABDOMEN 2 VIEWS: CPT

## 2025-08-11 RX ORDER — LEVOTHYROXINE SODIUM 50 UG/1
50 TABLET ORAL DAILY
Qty: 90 TABLET | Refills: 3 | Status: SHIPPED | OUTPATIENT
Start: 2025-08-11 | End: 2026-08-11

## 2025-08-11 RX ORDER — DARIFENACIN 15 MG/1
15 TABLET, EXTENDED RELEASE ORAL DAILY
COMMUNITY
End: 2025-08-11 | Stop reason: SDUPTHER

## 2025-08-11 RX ORDER — NITROFURANTOIN 25; 75 MG/1; MG/1
100 CAPSULE ORAL 2 TIMES DAILY
Qty: 14 CAPSULE | Refills: 0 | Status: SHIPPED | OUTPATIENT
Start: 2025-08-11 | End: 2025-08-19

## 2025-08-11 RX ORDER — METOPROLOL SUCCINATE 25 MG/1
12.5 TABLET, EXTENDED RELEASE ORAL DAILY
Qty: 45 TABLET | Refills: 3 | Status: SHIPPED | OUTPATIENT
Start: 2025-08-11 | End: 2026-08-11

## 2025-08-11 RX ORDER — DARIFENACIN 15 MG/1
15 TABLET, EXTENDED RELEASE ORAL DAILY
Qty: 90 TABLET | Refills: 3 | Status: SHIPPED | OUTPATIENT
Start: 2025-08-11 | End: 2026-08-11

## 2025-08-11 RX ORDER — NITROGLYCERIN 0.4 MG/1
0.4 TABLET SUBLINGUAL EVERY 5 MIN PRN
Qty: 25 TABLET | Refills: 11 | Status: SHIPPED | OUTPATIENT
Start: 2025-08-11 | End: 2026-08-11

## 2025-08-11 RX ORDER — SPIRONOLACTONE 25 MG/1
12.5 TABLET ORAL DAILY
Qty: 90 TABLET | Refills: 3 | Status: SHIPPED | OUTPATIENT
Start: 2025-08-11

## 2025-08-11 ASSESSMENT — PATIENT HEALTH QUESTIONNAIRE - PHQ9
1. LITTLE INTEREST OR PLEASURE IN DOING THINGS: NOT AT ALL
SUM OF ALL RESPONSES TO PHQ9 QUESTIONS 1 AND 2: 1
2. FEELING DOWN, DEPRESSED OR HOPELESS: SEVERAL DAYS

## 2025-08-18 ENCOUNTER — TELEPHONE (OUTPATIENT)
Dept: PRIMARY CARE | Facility: CLINIC | Age: 89
End: 2025-08-18
Payer: MEDICARE

## 2025-08-18 DIAGNOSIS — R39.9 URINARY TRACT INFECTION SYMPTOMS: Primary | ICD-10-CM

## 2025-08-21 ENCOUNTER — TELEPHONE (OUTPATIENT)
Dept: PRIMARY CARE | Facility: CLINIC | Age: 89
End: 2025-08-21
Payer: MEDICARE

## 2025-08-22 ENCOUNTER — TELEPHONE (OUTPATIENT)
Dept: PRIMARY CARE | Facility: CLINIC | Age: 89
End: 2025-08-22
Payer: MEDICARE

## 2025-08-24 LAB
APPEARANCE UR: ABNORMAL
BACTERIA #/AREA URNS HPF: ABNORMAL /HPF
BACTERIA UR CULT: ABNORMAL
BILIRUB UR QL STRIP: NEGATIVE
COLOR UR: YELLOW
GLUCOSE UR QL STRIP: NEGATIVE
HGB UR QL STRIP: ABNORMAL
HYALINE CASTS #/AREA URNS LPF: ABNORMAL /LPF
KETONES UR QL STRIP: NEGATIVE
LEUKOCYTE ESTERASE UR QL STRIP: ABNORMAL
NITRITE UR QL STRIP: NEGATIVE
PH UR STRIP: 5.5 [PH] (ref 5–8)
PROT UR QL STRIP: NEGATIVE
RBC #/AREA URNS HPF: ABNORMAL /HPF
SERVICE CMNT-IMP: ABNORMAL
SP GR UR STRIP: 1.01 (ref 1–1.03)
SQUAMOUS #/AREA URNS HPF: ABNORMAL /HPF
WBC #/AREA URNS HPF: ABNORMAL /HPF
YEAST #/AREA URNS HPF: ABNORMAL /HPF

## 2025-08-26 ENCOUNTER — RESULTS FOLLOW-UP (OUTPATIENT)
Dept: PRIMARY CARE | Facility: CLINIC | Age: 89
End: 2025-08-26
Payer: MEDICARE

## 2025-08-26 DIAGNOSIS — N30.00 ACUTE CYSTITIS WITHOUT HEMATURIA: Primary | ICD-10-CM

## 2025-08-28 RX ORDER — CIPROFLOXACIN 500 MG/1
500 TABLET, FILM COATED ORAL 2 TIMES DAILY
Qty: 14 TABLET | Refills: 0 | Status: SHIPPED | OUTPATIENT
Start: 2025-08-28 | End: 2025-08-29 | Stop reason: SDUPTHER

## 2025-08-29 ENCOUNTER — TELEPHONE (OUTPATIENT)
Dept: PRIMARY CARE | Facility: CLINIC | Age: 89
End: 2025-08-29
Payer: MEDICARE

## 2025-08-29 DIAGNOSIS — N30.00 ACUTE CYSTITIS WITHOUT HEMATURIA: ICD-10-CM

## 2025-08-29 RX ORDER — CIPROFLOXACIN 500 MG/1
500 TABLET, FILM COATED ORAL 2 TIMES DAILY
Qty: 14 TABLET | Refills: 0 | Status: SHIPPED | OUTPATIENT
Start: 2025-08-29 | End: 2025-09-05

## 2025-09-02 DIAGNOSIS — I10 ESSENTIAL HYPERTENSION: ICD-10-CM

## 2025-09-02 RX ORDER — CLONIDINE 0.3 MG/24H
1 PATCH, EXTENDED RELEASE TRANSDERMAL
Qty: 12 PATCH | Refills: 3 | Status: SHIPPED | OUTPATIENT
Start: 2025-09-07 | End: 2026-03-30

## (undated) DEVICE — WARMER, DUAL SCOPE

## (undated) DEVICE — NEEDLE, SAFETY, 25 GA X 1.5 IN

## (undated) DEVICE — MANIFOLD, 4 PORT NEPTUNE STANDARD

## (undated) DEVICE — SYSTEM, TROCAR LAP, 5X100MM, SHIELD BLADED, KII ADVANCED FIX ABDOMINAL

## (undated) DEVICE — STRIP, SKIN CLOSURE, COMPOUND BENZION TINCTURE 0.6ML

## (undated) DEVICE — GOWN, SURGICAL, ROYAL SILK, LG, STERILE

## (undated) DEVICE — Device

## (undated) DEVICE — TOWEL PACK 10-PK

## (undated) DEVICE — SUTURE, ETHIBOND, XTRA, 30 IN, 0, CTX, GREEN

## (undated) DEVICE — SUTURE, VICRYL, 0, 27 IN, UR-6, VIOLET

## (undated) DEVICE — GLOVE, SURGICAL, PROTEXIS PI BLUE W/NEUTHERA, 6.5, PF, LF

## (undated) DEVICE — SHEAR, W/UNIPOLAR CAUTERY, ENDOSHEAR, 5 MM

## (undated) DEVICE — ELECTRODE, ELECTROSURGICAL, LAPAROSCOPIC, L HOOK TIP, 36 IN

## (undated) DEVICE — LIGASURE, SEALER/DIVIDER MARYLAND JAW, 5MM

## (undated) DEVICE — MARKER, SKIN, W/ RULER, LF, STERILE

## (undated) DEVICE — ELECTRODE, ELECTROSURGICAL, BLADE, INSULATED, ENT/IMA, STERILE

## (undated) DEVICE — SUTURE, MONOCRYL, 4-0, 27 IN, PS-2, UNDYED

## (undated) DEVICE — DRAPE, INCISE, ANTIMICROBIAL, IOBAN 2, LARGE, 17 X 23 IN, DISPOSABLE, STERILE

## (undated) DEVICE — SOLUTION KIT, ANTIFOG, 6CC, LF

## (undated) DEVICE — APPLICATOR, CHLORAPREP, W/ORANGE TINT, 26ML

## (undated) DEVICE — SYRINGE, 10 CC, LUER LOCK

## (undated) DEVICE — STRAP, VELCRO, BODY, 4 X 60IN, NS

## (undated) DEVICE — GLOVE, SURGICAL, PROTEXIS PI , 6.0, PF, LF

## (undated) DEVICE — TROCAR SYSTEM, BALLOON, KII GELPORT, 12 X 100MM

## (undated) DEVICE — GOWN, SURGICAL, ROYAL SILK, XL, STERILE

## (undated) DEVICE — STRIP, SKIN CLOSURE, STERI STRIP, REINFORCED, 0.5 X 4 IN